# Patient Record
Sex: FEMALE | Race: WHITE | Employment: OTHER | URBAN - METROPOLITAN AREA
[De-identification: names, ages, dates, MRNs, and addresses within clinical notes are randomized per-mention and may not be internally consistent; named-entity substitution may affect disease eponyms.]

---

## 2017-02-22 ENCOUNTER — HOSPITAL ENCOUNTER (OUTPATIENT)
Dept: GENERAL RADIOLOGY | Age: 82
Discharge: HOME | End: 2017-02-22
Attending: INTERNAL MEDICINE | Admitting: INTERNAL MEDICINE

## 2017-02-22 LAB
ALBUMIN SERPL-MCNC: 3.7 GM/DL (ref 3.4–5)
ALP BLD-CCNC: 96 IU/L (ref 40–129)
ALT SERPL-CCNC: 19 U/L (ref 10–40)
AST SERPL-CCNC: 16 IU/L (ref 15–37)
BILIRUB SERPL-MCNC: 0.4 MG/DL (ref 0–1)
BILIRUBIN DIRECT: 0.2 MG/DL (ref 0–0.3)
BILIRUBIN, INDIRECT: 0.2 MG/DL (ref 0–0.7)
CHOLESTEROL: 139 MG/DL
ESTIMATED AVERAGE GLUCOSE: 148 MG/DL
GLUCOSE FASTING: 175 MG/DL (ref 70–99)
HBA1C MFR BLD: 6.8 % (ref 4.2–6.3)
HDLC SERPL-MCNC: 29 MG/DL
LDL CHOLESTEROL DIRECT: 78 MG/DL
TOTAL PROTEIN: 6.9 GM/DL (ref 6.4–8.2)
TRIGL SERPL-MCNC: 234 MG/DL

## 2017-05-25 ENCOUNTER — HOSPITAL ENCOUNTER (OUTPATIENT)
Dept: GENERAL RADIOLOGY | Age: 82
Discharge: HOME | End: 2017-05-25
Attending: INTERNAL MEDICINE | Admitting: INTERNAL MEDICINE

## 2017-05-25 LAB
ESTIMATED AVERAGE GLUCOSE: 160 MG/DL
GLUCOSE FASTING: 134 MG/DL (ref 70–99)
HBA1C MFR BLD: 7.2 % (ref 4.2–6.3)

## 2017-09-11 ENCOUNTER — HOSPITAL ENCOUNTER (OUTPATIENT)
Dept: GENERAL RADIOLOGY | Age: 82
Discharge: HOME | End: 2017-09-11
Attending: INTERNAL MEDICINE | Admitting: INTERNAL MEDICINE

## 2017-09-11 DIAGNOSIS — I10 ESSENTIAL HYPERTENSION, MALIGNANT: ICD-10-CM

## 2017-09-11 LAB
ALBUMIN SERPL-MCNC: 3.8 GM/DL (ref 3.4–5)
ALP BLD-CCNC: 100 IU/L (ref 40–129)
ALT SERPL-CCNC: 23 U/L (ref 10–40)
ANION GAP SERPL CALCULATED.3IONS-SCNC: 14 MMOL/L (ref 4–16)
AST SERPL-CCNC: 23 IU/L (ref 15–37)
BACTERIA: ABNORMAL /HPF
BASOPHILS ABSOLUTE: 0.1 K/CU MM
BASOPHILS RELATIVE PERCENT: 0.9 % (ref 0–1)
BILIRUB SERPL-MCNC: 0.3 MG/DL (ref 0–1)
BILIRUBIN URINE: NEGATIVE MG/DL
BLOOD, URINE: ABNORMAL
BUN BLDV-MCNC: 21 MG/DL (ref 6–23)
CALCIUM SERPL-MCNC: 9.5 MG/DL (ref 8.3–10.6)
CHLORIDE BLD-SCNC: 101 MMOL/L (ref 99–110)
CHOLESTEROL: 128 MG/DL
CLARITY: ABNORMAL
CO2: 21 MMOL/L (ref 21–32)
COLOR: YELLOW
CREAT SERPL-MCNC: 1.1 MG/DL (ref 0.6–1.1)
DIFFERENTIAL TYPE: ABNORMAL
EOSINOPHILS ABSOLUTE: 0.2 K/CU MM
EOSINOPHILS RELATIVE PERCENT: 3.2 % (ref 0–3)
ESTIMATED AVERAGE GLUCOSE: 157 MG/DL
GFR AFRICAN AMERICAN: 58 ML/MIN/1.73M2
GFR NON-AFRICAN AMERICAN: 48 ML/MIN/1.73M2
GLUCOSE FASTING: 182 MG/DL (ref 70–99)
GLUCOSE, URINE: NEGATIVE MG/DL
HBA1C MFR BLD: 7.1 % (ref 4.2–6.3)
HCT VFR BLD CALC: 34.7 % (ref 37–47)
HDLC SERPL-MCNC: 30 MG/DL
HEMOGLOBIN: 10.8 GM/DL (ref 12.5–16)
IMMATURE NEUTROPHIL %: 0.4 % (ref 0–0.43)
KETONES, URINE: NEGATIVE MG/DL
LDL CHOLESTEROL DIRECT: 71 MG/DL
LEUKOCYTE ESTERASE, URINE: ABNORMAL
LYMPHOCYTES ABSOLUTE: 1.4 K/CU MM
LYMPHOCYTES RELATIVE PERCENT: 18.9 % (ref 24–44)
MCH RBC QN AUTO: 26.7 PG (ref 27–31)
MCHC RBC AUTO-ENTMCNC: 31.1 % (ref 32–36)
MCV RBC AUTO: 85.7 FL (ref 78–100)
MONOCYTES ABSOLUTE: 0.6 K/CU MM
MONOCYTES RELATIVE PERCENT: 7.5 % (ref 0–4)
MUCUS: ABNORMAL HPF
NITRITE URINE, QUANTITATIVE: NEGATIVE
NUCLEATED RBC %: 0 %
PDW BLD-RTO: 16.1 % (ref 11.7–14.9)
PH, URINE: 5 (ref 5–8)
PLATELET # BLD: 298 K/CU MM (ref 140–440)
PMV BLD AUTO: 11.4 FL (ref 7.5–11.1)
POTASSIUM SERPL-SCNC: 4.7 MMOL/L (ref 3.5–5.1)
PROTEIN UA: NEGATIVE MG/DL
RBC # BLD: 4.05 M/CU MM (ref 4.2–5.4)
RBC URINE: 2 /HPF (ref 0–6)
SEGMENTED NEUTROPHILS ABSOLUTE COUNT: 5.3 K/CU MM
SEGMENTED NEUTROPHILS RELATIVE PERCENT: 69.1 % (ref 36–66)
SODIUM BLD-SCNC: 136 MMOL/L (ref 135–145)
SPECIFIC GRAVITY UA: 1.01 (ref 1–1.03)
SQUAMOUS EPITHELIAL: 3 /HPF
T3 FREE: 3.1 PG/ML (ref 2.3–4.2)
T4 FREE: 1.33 NG/DL (ref 0.9–1.8)
TOTAL IMMATURE NEUTOROPHIL: 0.03 K/CU MM
TOTAL NUCLEATED RBC: 0 K/CU MM
TOTAL PROTEIN: 7.4 GM/DL (ref 6.4–8.2)
TRICHOMONAS: ABNORMAL /HPF
TRIGL SERPL-MCNC: 150 MG/DL
TSH HIGH SENSITIVITY: 4.16 UIU/ML (ref 0.27–4.2)
UROBILINOGEN, URINE: NORMAL MG/DL (ref 0.2–1)
WBC # BLD: 7.6 K/CU MM (ref 4–10.5)
WBC CLUMP: ABNORMAL /HPF
WBC UA: 111 /HPF (ref 0–5)
YEAST: ABNORMAL /HPF

## 2017-09-18 ENCOUNTER — HOSPITAL ENCOUNTER (OUTPATIENT)
Dept: OTHER | Age: 82
Discharge: HOME | End: 2017-09-18
Attending: INTERNAL MEDICINE | Admitting: INTERNAL MEDICINE

## 2017-09-22 LAB
HEMOCCULT SP1 STL QL: NEGATIVE
OCCULT BLOOD 2: NEGATIVE
OCCULT BLOOD 3: NEGATIVE

## 2017-12-15 ENCOUNTER — HOSPITAL ENCOUNTER (OUTPATIENT)
Dept: GENERAL RADIOLOGY | Age: 82
Discharge: HOME | End: 2017-12-15
Attending: INTERNAL MEDICINE | Admitting: INTERNAL MEDICINE

## 2017-12-15 LAB
BASOPHILS ABSOLUTE: 0.1 K/CU MM
BASOPHILS RELATIVE PERCENT: 1.1 % (ref 0–1)
DIFFERENTIAL TYPE: ABNORMAL
EOSINOPHILS ABSOLUTE: 0.2 K/CU MM
EOSINOPHILS RELATIVE PERCENT: 2.9 % (ref 0–3)
ESTIMATED AVERAGE GLUCOSE: 157 MG/DL
GLUCOSE FASTING: 124 MG/DL (ref 70–99)
HBA1C MFR BLD: 7.1 % (ref 4.2–6.3)
HCT VFR BLD CALC: 41.7 % (ref 37–47)
HEMOGLOBIN: 13.2 GM/DL (ref 12.5–16)
IMMATURE NEUTROPHIL %: 0.3 % (ref 0–0.43)
LYMPHOCYTES ABSOLUTE: 1.6 K/CU MM
LYMPHOCYTES RELATIVE PERCENT: 22.3 % (ref 24–44)
MCH RBC QN AUTO: 26.5 PG (ref 27–31)
MCHC RBC AUTO-ENTMCNC: 31.7 % (ref 32–36)
MCV RBC AUTO: 83.6 FL (ref 78–100)
MONOCYTES ABSOLUTE: 0.7 K/CU MM
MONOCYTES RELATIVE PERCENT: 9.2 % (ref 0–4)
NUCLEATED RBC %: 0 %
PDW BLD-RTO: 15.8 % (ref 11.7–14.9)
PLATELET # BLD: 224 K/CU MM (ref 140–440)
PMV BLD AUTO: 11.9 FL (ref 7.5–11.1)
RBC # BLD: 4.99 M/CU MM (ref 4.2–5.4)
SEGMENTED NEUTROPHILS ABSOLUTE COUNT: 4.7 K/CU MM
SEGMENTED NEUTROPHILS RELATIVE PERCENT: 64.2 % (ref 36–66)
TOTAL IMMATURE NEUTOROPHIL: 0.02 K/CU MM
TOTAL NUCLEATED RBC: 0 K/CU MM
WBC # BLD: 7.4 K/CU MM (ref 4–10.5)

## 2018-03-09 ENCOUNTER — HOSPITAL ENCOUNTER (OUTPATIENT)
Dept: GENERAL RADIOLOGY | Age: 83
Discharge: HOME | End: 2018-03-09
Attending: INTERNAL MEDICINE | Admitting: INTERNAL MEDICINE

## 2018-03-09 LAB
ALBUMIN SERPL-MCNC: 3.8 GM/DL (ref 3.4–5)
ALP BLD-CCNC: 99 IU/L (ref 40–129)
ALT SERPL-CCNC: 27 U/L (ref 10–40)
AST SERPL-CCNC: 26 IU/L (ref 15–37)
BILIRUB SERPL-MCNC: 0.3 MG/DL (ref 0–1)
BILIRUBIN DIRECT: 0.2 MG/DL (ref 0–0.3)
BILIRUBIN, INDIRECT: 0.1 MG/DL (ref 0–0.7)
CHOLESTEROL: 107 MG/DL
ESTIMATED AVERAGE GLUCOSE: 134 MG/DL
GLUCOSE FASTING: 115 MG/DL (ref 70–99)
HBA1C MFR BLD: 6.3 % (ref 4.2–6.3)
HDLC SERPL-MCNC: 30 MG/DL
LDL CHOLESTEROL DIRECT: 60 MG/DL
TOTAL PROTEIN: 7.2 GM/DL (ref 6.4–8.2)
TRIGL SERPL-MCNC: 170 MG/DL

## 2018-06-11 ENCOUNTER — HOSPITAL ENCOUNTER (OUTPATIENT)
Dept: GENERAL RADIOLOGY | Age: 83
Discharge: HOME | End: 2018-06-11
Attending: INTERNAL MEDICINE | Admitting: INTERNAL MEDICINE

## 2018-06-11 LAB
ESTIMATED AVERAGE GLUCOSE: 160 MG/DL
GLUCOSE FASTING: 159 MG/DL (ref 70–99)
HBA1C MFR BLD: 7.2 % (ref 4.2–6.3)

## 2018-09-12 ENCOUNTER — HOSPITAL ENCOUNTER (OUTPATIENT)
Dept: GENERAL RADIOLOGY | Age: 83
Discharge: HOME | End: 2018-09-12
Attending: INTERNAL MEDICINE | Admitting: INTERNAL MEDICINE

## 2018-09-12 LAB
ALBUMIN SERPL-MCNC: 3.7 GM/DL (ref 3.4–5)
ALP BLD-CCNC: 84 IU/L (ref 40–128)
ALT SERPL-CCNC: 18 U/L (ref 10–40)
ANION GAP SERPL CALCULATED.3IONS-SCNC: 12 MMOL/L (ref 4–16)
AST SERPL-CCNC: 18 IU/L (ref 15–37)
BACTERIA: ABNORMAL /HPF
BASOPHILS ABSOLUTE: 0.1 K/CU MM
BASOPHILS RELATIVE PERCENT: 0.7 % (ref 0–1)
BILIRUB SERPL-MCNC: 0.4 MG/DL (ref 0–1)
BILIRUBIN URINE: NEGATIVE MG/DL
BLOOD, URINE: ABNORMAL
BUN BLDV-MCNC: 14 MG/DL (ref 6–23)
CALCIUM SERPL-MCNC: 9.4 MG/DL (ref 8.3–10.6)
CHLORIDE BLD-SCNC: 104 MMOL/L (ref 99–110)
CHOLESTEROL: 120 MG/DL
CLARITY: ABNORMAL
CO2: 24 MMOL/L (ref 21–32)
COLOR: YELLOW
CREAT SERPL-MCNC: 1 MG/DL (ref 0.6–1.1)
DIFFERENTIAL TYPE: ABNORMAL
EOSINOPHILS ABSOLUTE: 0.2 K/CU MM
EOSINOPHILS RELATIVE PERCENT: 3.1 % (ref 0–3)
ESTIMATED AVERAGE GLUCOSE: 160 MG/DL
GFR AFRICAN AMERICAN: >60 ML/MIN/1.73M2
GFR NON-AFRICAN AMERICAN: 53 ML/MIN/1.73M2
GLUCOSE BLD-MCNC: 137 MG/DL (ref 70–99)
GLUCOSE, URINE: NEGATIVE MG/DL
HBA1C MFR BLD: 7.2 % (ref 4.2–6.3)
HCT VFR BLD CALC: 39.2 % (ref 37–47)
HDLC SERPL-MCNC: 29 MG/DL
HEMOGLOBIN: 12.7 GM/DL (ref 12.5–16)
IMMATURE NEUTROPHIL %: 0.3 % (ref 0–0.43)
KETONES, URINE: NEGATIVE MG/DL
LDL CHOLESTEROL DIRECT: 72 MG/DL
LEUKOCYTE ESTERASE, URINE: ABNORMAL
LYMPHOCYTES ABSOLUTE: 1.4 K/CU MM
LYMPHOCYTES RELATIVE PERCENT: 19.9 % (ref 24–44)
MCH RBC QN AUTO: 28.6 PG (ref 27–31)
MCHC RBC AUTO-ENTMCNC: 32.4 % (ref 32–36)
MCV RBC AUTO: 88.3 FL (ref 78–100)
MONOCYTES ABSOLUTE: 0.5 K/CU MM
MONOCYTES RELATIVE PERCENT: 7.7 % (ref 0–4)
NITRITE URINE, QUANTITATIVE: POSITIVE
NUCLEATED RBC %: 0 %
PDW BLD-RTO: 15 % (ref 11.7–14.9)
PH, URINE: 5 (ref 5–8)
PLATELET # BLD: 226 K/CU MM (ref 140–440)
PMV BLD AUTO: 12.1 FL (ref 7.5–11.1)
POTASSIUM SERPL-SCNC: 4.2 MMOL/L (ref 3.5–5.1)
PROTEIN UA: NEGATIVE MG/DL
RBC # BLD: 4.44 M/CU MM (ref 4.2–5.4)
RBC URINE: 7 /HPF (ref 0–6)
SEGMENTED NEUTROPHILS ABSOLUTE COUNT: 4.7 K/CU MM
SEGMENTED NEUTROPHILS RELATIVE PERCENT: 68.3 % (ref 36–66)
SODIUM BLD-SCNC: 140 MMOL/L (ref 135–145)
SPECIFIC GRAVITY UA: 1.01 (ref 1–1.03)
SQUAMOUS EPITHELIAL: 3 /HPF
T3 FREE: 3 PG/ML (ref 2.3–4.2)
T4 FREE: 1.21 NG/DL (ref 0.9–1.8)
TOTAL IMMATURE NEUTOROPHIL: 0.02 K/CU MM
TOTAL NUCLEATED RBC: 0 K/CU MM
TOTAL PROTEIN: 6.7 GM/DL (ref 6.4–8.2)
TRICHOMONAS: ABNORMAL /HPF
TRIGL SERPL-MCNC: 173 MG/DL
UROBILINOGEN, URINE: NORMAL MG/DL (ref 0.2–1)
WBC # BLD: 6.9 K/CU MM (ref 4–10.5)
WBC CLUMP: ABNORMAL /HPF
WBC UA: 92 /HPF (ref 0–5)

## 2018-12-11 ENCOUNTER — HOSPITAL ENCOUNTER (OUTPATIENT)
Age: 83
Discharge: HOME | End: 2018-12-11
Payer: MEDICARE

## 2018-12-11 LAB
ESTIMATED AVERAGE GLUCOSE: 148 MG/DL
GLUCOSE FASTING: 125 MG/DL (ref 70–99)
HBA1C MFR BLD: 6.8 % (ref 4.2–6.3)

## 2018-12-11 PROCEDURE — 82947 ASSAY GLUCOSE BLOOD QUANT: CPT

## 2018-12-11 PROCEDURE — 36415 COLL VENOUS BLD VENIPUNCTURE: CPT

## 2018-12-11 PROCEDURE — 83036 HEMOGLOBIN GLYCOSYLATED A1C: CPT

## 2019-02-27 ENCOUNTER — HOSPITAL ENCOUNTER (OUTPATIENT)
Age: 84
Discharge: HOME | End: 2019-02-27
Payer: MEDICARE

## 2019-02-27 LAB
ALBUMIN SERPL-MCNC: 4.1 GM/DL (ref 3.4–5)
ALP BLD-CCNC: 76 IU/L (ref 40–129)
ALT SERPL-CCNC: 17 U/L (ref 10–40)
AST SERPL-CCNC: 15 IU/L (ref 15–37)
BILIRUB SERPL-MCNC: 0.5 MG/DL (ref 0–1)
BILIRUBIN DIRECT: 0.2 MG/DL (ref 0–0.3)
BILIRUBIN, INDIRECT: 0.3 MG/DL (ref 0–0.7)
CHOLESTEROL: 125 MG/DL
ESTIMATED AVERAGE GLUCOSE: 140 MG/DL
GLUCOSE FASTING: 135 MG/DL (ref 70–99)
HBA1C MFR BLD: 6.5 % (ref 4.2–6.3)
HDLC SERPL-MCNC: 36 MG/DL
LDL CHOLESTEROL DIRECT: 77 MG/DL
TOTAL PROTEIN: 7.3 GM/DL (ref 6.4–8.2)
TRIGL SERPL-MCNC: 116 MG/DL

## 2019-02-27 PROCEDURE — 80061 LIPID PANEL: CPT

## 2019-02-27 PROCEDURE — 83036 HEMOGLOBIN GLYCOSYLATED A1C: CPT

## 2019-02-27 PROCEDURE — 82947 ASSAY GLUCOSE BLOOD QUANT: CPT

## 2019-02-27 PROCEDURE — 36415 COLL VENOUS BLD VENIPUNCTURE: CPT

## 2019-02-27 PROCEDURE — 83721 ASSAY OF BLOOD LIPOPROTEIN: CPT

## 2019-02-27 PROCEDURE — 80076 HEPATIC FUNCTION PANEL: CPT

## 2019-09-12 ENCOUNTER — APPOINTMENT (OUTPATIENT)
Dept: GENERAL RADIOLOGY | Age: 84
DRG: 287 | End: 2019-09-12
Payer: MEDICARE

## 2019-09-12 ENCOUNTER — HOSPITAL ENCOUNTER (INPATIENT)
Age: 84
LOS: 5 days | Discharge: HOME | DRG: 287 | End: 2019-09-17
Attending: EMERGENCY MEDICINE | Admitting: GENERAL PRACTICE
Payer: MEDICARE

## 2019-09-12 ENCOUNTER — APPOINTMENT (OUTPATIENT)
Dept: CT IMAGING | Age: 84
DRG: 287 | End: 2019-09-12
Payer: MEDICARE

## 2019-09-12 PROBLEM — I48.92 ATRIAL FLUTTER (HCC): Status: ACTIVE | Noted: 2019-09-12

## 2019-09-12 LAB
ALBUMIN SERPL-MCNC: 3.1 GM/DL (ref 3.4–5)
ALP BLD-CCNC: 98 IU/L (ref 40–129)
ALT SERPL-CCNC: 17 U/L (ref 10–40)
ANION GAP SERPL CALCULATED.3IONS-SCNC: 17 MMOL/L (ref 4–16)
APTT: 36.1 SECONDS (ref 21.2–33)
AST SERPL-CCNC: 23 IU/L (ref 15–37)
BASOPHILS ABSOLUTE: 0 K/CU MM
BASOPHILS RELATIVE PERCENT: 0.6 % (ref 0–1)
BILIRUB SERPL-MCNC: 1.3 MG/DL (ref 0–1)
BUN BLDV-MCNC: 15 MG/DL (ref 6–23)
CALCIUM SERPL-MCNC: 7.6 MG/DL (ref 8.3–10.6)
CHLORIDE BLD-SCNC: 100 MMOL/L (ref 99–110)
CO2: 20 MMOL/L (ref 21–32)
CREAT SERPL-MCNC: 1.3 MG/DL (ref 0.6–1.1)
D DIMER: 891 NG/ML(DDU)
DIFFERENTIAL TYPE: ABNORMAL
EOSINOPHILS ABSOLUTE: 0 K/CU MM
EOSINOPHILS RELATIVE PERCENT: 0.3 % (ref 0–3)
GFR AFRICAN AMERICAN: 47 ML/MIN/1.73M2
GFR NON-AFRICAN AMERICAN: 39 ML/MIN/1.73M2
GLUCOSE BLD-MCNC: 110 MG/DL (ref 70–99)
GLUCOSE BLD-MCNC: 130 MG/DL (ref 70–99)
HCT VFR BLD CALC: 34.5 % (ref 37–47)
HEMOGLOBIN: 10.7 GM/DL (ref 12.5–16)
IMMATURE NEUTROPHIL %: 0.3 % (ref 0–0.43)
INR BLD: 1.81 INDEX
LYMPHOCYTES ABSOLUTE: 1 K/CU MM
LYMPHOCYTES RELATIVE PERCENT: 14.6 % (ref 24–44)
MAGNESIUM: 0.4 MG/DL (ref 1.8–2.4)
MCH RBC QN AUTO: 25.1 PG (ref 27–31)
MCHC RBC AUTO-ENTMCNC: 31 % (ref 32–36)
MCV RBC AUTO: 80.8 FL (ref 78–100)
MONOCYTES ABSOLUTE: 0.4 K/CU MM
MONOCYTES RELATIVE PERCENT: 6 % (ref 0–4)
NUCLEATED RBC %: 0 %
PDW BLD-RTO: 17.9 % (ref 11.7–14.9)
PLATELET # BLD: 252 K/CU MM (ref 140–440)
PMV BLD AUTO: 11.7 FL (ref 7.5–11.1)
POTASSIUM SERPL-SCNC: ABNORMAL MMOL/L (ref 3.5–5.1)
PRO-BNP: 2749 PG/ML
PROTHROMBIN TIME: 20.8 SECONDS (ref 9.12–12.5)
RBC # BLD: 4.27 M/CU MM (ref 4.2–5.4)
SEGMENTED NEUTROPHILS ABSOLUTE COUNT: 5.4 K/CU MM
SEGMENTED NEUTROPHILS RELATIVE PERCENT: 78.2 % (ref 36–66)
SODIUM BLD-SCNC: 137 MMOL/L (ref 135–145)
T4 FREE: 1.4 NG/DL (ref 0.9–1.8)
TOTAL IMMATURE NEUTOROPHIL: 0.02 K/CU MM
TOTAL NUCLEATED RBC: 0 K/CU MM
TOTAL PROTEIN: 7.3 GM/DL (ref 6.4–8.2)
TROPONIN T: <0.01 NG/ML
TSH HIGH SENSITIVITY: 5.2 UIU/ML (ref 0.27–4.2)
WBC # BLD: 6.9 K/CU MM (ref 4–10.5)

## 2019-09-12 PROCEDURE — 6370000000 HC RX 637 (ALT 250 FOR IP): Performed by: GENERAL PRACTICE

## 2019-09-12 PROCEDURE — 99285 EMERGENCY DEPT VISIT HI MDM: CPT

## 2019-09-12 PROCEDURE — 6370000000 HC RX 637 (ALT 250 FOR IP): Performed by: INTERNAL MEDICINE

## 2019-09-12 PROCEDURE — 84439 ASSAY OF FREE THYROXINE: CPT

## 2019-09-12 PROCEDURE — 2140000000 HC CCU INTERMEDIATE R&B

## 2019-09-12 PROCEDURE — 6360000004 HC RX CONTRAST MEDICATION: Performed by: INTERNAL MEDICINE

## 2019-09-12 PROCEDURE — 6360000002 HC RX W HCPCS: Performed by: INTERNAL MEDICINE

## 2019-09-12 PROCEDURE — 6360000002 HC RX W HCPCS: Performed by: GENERAL PRACTICE

## 2019-09-12 PROCEDURE — 85025 COMPLETE CBC W/AUTO DIFF WBC: CPT

## 2019-09-12 PROCEDURE — 96365 THER/PROPH/DIAG IV INF INIT: CPT

## 2019-09-12 PROCEDURE — 93005 ELECTROCARDIOGRAM TRACING: CPT | Performed by: EMERGENCY MEDICINE

## 2019-09-12 PROCEDURE — 83735 ASSAY OF MAGNESIUM: CPT

## 2019-09-12 PROCEDURE — 80053 COMPREHEN METABOLIC PANEL: CPT

## 2019-09-12 PROCEDURE — 82962 GLUCOSE BLOOD TEST: CPT

## 2019-09-12 PROCEDURE — 85379 FIBRIN DEGRADATION QUANT: CPT

## 2019-09-12 PROCEDURE — 84443 ASSAY THYROID STIM HORMONE: CPT

## 2019-09-12 PROCEDURE — 85730 THROMBOPLASTIN TIME PARTIAL: CPT

## 2019-09-12 PROCEDURE — 36415 COLL VENOUS BLD VENIPUNCTURE: CPT

## 2019-09-12 PROCEDURE — 71275 CT ANGIOGRAPHY CHEST: CPT

## 2019-09-12 PROCEDURE — 83880 ASSAY OF NATRIURETIC PEPTIDE: CPT

## 2019-09-12 PROCEDURE — 96375 TX/PRO/DX INJ NEW DRUG ADDON: CPT

## 2019-09-12 PROCEDURE — 71045 X-RAY EXAM CHEST 1 VIEW: CPT

## 2019-09-12 PROCEDURE — 2580000003 HC RX 258: Performed by: INTERNAL MEDICINE

## 2019-09-12 PROCEDURE — 83036 HEMOGLOBIN GLYCOSYLATED A1C: CPT

## 2019-09-12 PROCEDURE — 84484 ASSAY OF TROPONIN QUANT: CPT

## 2019-09-12 PROCEDURE — 85610 PROTHROMBIN TIME: CPT

## 2019-09-12 RX ORDER — SODIUM CHLORIDE 0.9 % (FLUSH) 0.9 %
10 SYRINGE (ML) INJECTION PRN
Status: DISCONTINUED | OUTPATIENT
Start: 2019-09-12 | End: 2019-09-17 | Stop reason: HOSPADM

## 2019-09-12 RX ORDER — PANTOPRAZOLE SODIUM 40 MG/1
40 TABLET, DELAYED RELEASE ORAL DAILY
Status: DISCONTINUED | OUTPATIENT
Start: 2019-09-12 | End: 2019-09-17 | Stop reason: HOSPADM

## 2019-09-12 RX ORDER — POTASSIUM CHLORIDE 20 MEQ/1
40 TABLET, EXTENDED RELEASE ORAL ONCE
Status: COMPLETED | OUTPATIENT
Start: 2019-09-12 | End: 2019-09-12

## 2019-09-12 RX ORDER — ONDANSETRON 2 MG/ML
4 INJECTION INTRAMUSCULAR; INTRAVENOUS EVERY 6 HOURS PRN
Status: DISCONTINUED | OUTPATIENT
Start: 2019-09-12 | End: 2019-09-17 | Stop reason: HOSPADM

## 2019-09-12 RX ORDER — DIPHENOXYLATE HYDROCHLORIDE AND ATROPINE SULFATE 2.5; .025 MG/1; MG/1
1 TABLET ORAL 4 TIMES DAILY PRN
Status: DISCONTINUED | OUTPATIENT
Start: 2019-09-12 | End: 2019-09-17 | Stop reason: HOSPADM

## 2019-09-12 RX ORDER — NICOTINE POLACRILEX 4 MG
15 LOZENGE BUCCAL PRN
Status: DISCONTINUED | OUTPATIENT
Start: 2019-09-12 | End: 2019-09-17 | Stop reason: HOSPADM

## 2019-09-12 RX ORDER — CHOLESTYRAMINE LIGHT 4 G/5.7G
4 POWDER, FOR SUSPENSION ORAL DAILY
Status: ON HOLD | COMMUNITY
End: 2019-09-17 | Stop reason: HOSPADM

## 2019-09-12 RX ORDER — DIPHENOXYLATE HYDROCHLORIDE AND ATROPINE SULFATE 2.5; .025 MG/1; MG/1
1 TABLET ORAL 4 TIMES DAILY PRN
Status: ON HOLD | COMMUNITY
End: 2019-11-18 | Stop reason: HOSPADM

## 2019-09-12 RX ORDER — LATANOPROST 50 UG/ML
1 SOLUTION/ DROPS OPHTHALMIC NIGHTLY
Status: DISCONTINUED | OUTPATIENT
Start: 2019-09-12 | End: 2019-09-17 | Stop reason: HOSPADM

## 2019-09-12 RX ORDER — SODIUM CHLORIDE 9 MG/ML
INJECTION, SOLUTION INTRAVENOUS CONTINUOUS
Status: DISCONTINUED | OUTPATIENT
Start: 2019-09-12 | End: 2019-09-12

## 2019-09-12 RX ORDER — METOPROLOL TARTRATE 50 MG/1
50 TABLET, FILM COATED ORAL 2 TIMES DAILY
Status: DISCONTINUED | OUTPATIENT
Start: 2019-09-12 | End: 2019-09-14

## 2019-09-12 RX ORDER — POTASSIUM CHLORIDE AND SODIUM CHLORIDE 450; 150 MG/100ML; MG/100ML
INJECTION, SOLUTION INTRAVENOUS CONTINUOUS
Status: DISCONTINUED | OUTPATIENT
Start: 2019-09-12 | End: 2019-09-13

## 2019-09-12 RX ORDER — SODIUM CHLORIDE 0.9 % (FLUSH) 0.9 %
10 SYRINGE (ML) INJECTION EVERY 12 HOURS SCHEDULED
Status: DISCONTINUED | OUTPATIENT
Start: 2019-09-12 | End: 2019-09-17 | Stop reason: HOSPADM

## 2019-09-12 RX ORDER — DEXTROSE MONOHYDRATE 50 MG/ML
100 INJECTION, SOLUTION INTRAVENOUS PRN
Status: DISCONTINUED | OUTPATIENT
Start: 2019-09-12 | End: 2019-09-17 | Stop reason: HOSPADM

## 2019-09-12 RX ORDER — SODIUM CHLORIDE 0.9 % (FLUSH) 0.9 %
10 SYRINGE (ML) INJECTION
Status: COMPLETED | OUTPATIENT
Start: 2019-09-12 | End: 2019-09-12

## 2019-09-12 RX ORDER — DEXTROSE MONOHYDRATE 25 G/50ML
12.5 INJECTION, SOLUTION INTRAVENOUS PRN
Status: DISCONTINUED | OUTPATIENT
Start: 2019-09-12 | End: 2019-09-17 | Stop reason: HOSPADM

## 2019-09-12 RX ORDER — MAGNESIUM SULFATE 4 G/50ML
4 INJECTION INTRAVENOUS ONCE
Status: COMPLETED | OUTPATIENT
Start: 2019-09-12 | End: 2019-09-12

## 2019-09-12 RX ADMIN — AMIODARONE HYDROCHLORIDE 150 MG: 50 INJECTION, SOLUTION INTRAVENOUS at 18:45

## 2019-09-12 RX ADMIN — IOPAMIDOL 75 ML: 755 INJECTION, SOLUTION INTRAVENOUS at 18:12

## 2019-09-12 RX ADMIN — PANTOPRAZOLE SODIUM 40 MG: 40 TABLET, DELAYED RELEASE ORAL at 22:49

## 2019-09-12 RX ADMIN — LATANOPROST 1 DROP: 50 SOLUTION/ DROPS OPHTHALMIC at 22:49

## 2019-09-12 RX ADMIN — SODIUM CHLORIDE: 9 INJECTION, SOLUTION INTRAVENOUS at 17:38

## 2019-09-12 RX ADMIN — POTASSIUM CHLORIDE 40 MEQ: 20 TABLET, EXTENDED RELEASE ORAL at 18:57

## 2019-09-12 RX ADMIN — MAGNESIUM SULFATE HEPTAHYDRATE 4 G: 80 INJECTION, SOLUTION INTRAVENOUS at 19:22

## 2019-09-12 RX ADMIN — AMIODARONE HYDROCHLORIDE 1 MG/MIN: 50 INJECTION, SOLUTION INTRAVENOUS at 19:15

## 2019-09-12 RX ADMIN — METOPROLOL TARTRATE 50 MG: 50 TABLET ORAL at 17:37

## 2019-09-12 RX ADMIN — ENOXAPARIN SODIUM 40 MG: 40 INJECTION SUBCUTANEOUS at 22:49

## 2019-09-12 RX ADMIN — POTASSIUM CHLORIDE AND SODIUM CHLORIDE: 450; 150 INJECTION, SOLUTION INTRAVENOUS at 19:22

## 2019-09-12 RX ADMIN — ENOXAPARIN SODIUM 70 MG: 80 INJECTION SUBCUTANEOUS at 17:38

## 2019-09-12 RX ADMIN — Medication 10 ML: at 18:14

## 2019-09-12 ASSESSMENT — ENCOUNTER SYMPTOMS
SHORTNESS OF BREATH: 0
COUGH: 0
NAUSEA: 0
CONSTIPATION: 0
SORE THROAT: 0
WHEEZING: 0
SINUS PRESSURE: 0
ABDOMINAL PAIN: 0
DIARRHEA: 0
VOMITING: 0
RHINORRHEA: 0

## 2019-09-12 ASSESSMENT — PAIN SCALES - GENERAL
PAINLEVEL_OUTOF10: 0

## 2019-09-12 NOTE — ED PROVIDER NOTES
Emergency Department Encounter    Patient: Paloma Brown  MRN: [id number]  : 1935  Date of Evaluation: 2019  ED Provider:  Venu Londono    Triage Chief Complaint:   Tachycardia and Fatigue    HPI:  Paloma Brown is a 81 y.o. female who presents with palpitations. Patient states that she has been more fatigued over the last couple of days, and went to see her PCP today. She does notice a typical, routine follow-up, she is found to be tachycardic, was sent to her cardiologist, who then referred her to the ED. Denies F/C, CP, SOB, N/V, abdominal pain, dysuria, diarrhea and constipatin. ROS:  Review of Systems   Constitutional: Negative for chills and fever. HENT: Negative for congestion, rhinorrhea, sinus pressure and sore throat. Eyes: Negative for visual disturbance. Respiratory: Negative for cough, shortness of breath and wheezing. Cardiovascular: Positive for palpitations. Negative for chest pain. Gastrointestinal: Negative for abdominal pain, constipation, diarrhea, nausea and vomiting. Genitourinary: Negative for dysuria, frequency and urgency. Musculoskeletal: Negative for arthralgias and myalgias. Skin: Negative for rash. Neurological: Negative for dizziness and syncope. Psychiatric/Behavioral: Negative for agitation, behavioral problems and confusion. Past Medical History:   Diagnosis Date    Diabetes mellitus ([address]     Hyperlipidemia     Hypertension      Past Surgical History:   Procedure Laterality Date    ABDOMINAL EXPLORATION SURGERY  [id number]    distal gastrectomy, migel en y , cholecystectomy    HYSTERECTOMY      TONSILLECTOMY AND ADENOIDECTOMY       History reviewed. No pertinent family history.   Social History     Socioeconomic History    Marital status:      Spouse name: Not on file    Number of children: Not on file    Years of education: Not on file    Highest education level: Not on file   Occupational History    Not on file   Social Needs    Financial resource strain: Not on file    Food insecurity:     Worry: Not on file     Inability: Not on file    Transportation needs:     Medical: Not on file     Non-medical: Not on file   Tobacco Use    Smoking status: Former Smoker    Smokeless tobacco: Never Used   Substance and Sexual Activity    Alcohol use: Yes     Alcohol/week: 0.0 standard drinks     Comment: rarely    Drug use: No    Sexual activity: Not on file   Lifestyle    Physical activity:     Days per week: Not on file     Minutes per session: Not on file    Stress: Not on file   Relationships    Social connections:     Talks on phone: Not on file     Gets together: Not on file     Attends Muslim service: Not on file     Active member of club or organization: Not on file     Attends meetings of clubs or organizations: Not on file     Relationship status: Not on file    Intimate partner violence:     Fear of current or ex partner: Not on file     Emotionally abused: Not on file     Physically abused: Not on file     Forced sexual activity: Not on file   Other Topics Concern    Not on file   Social History Narrative    Not on file     No current facility-administered medications for this encounter.       Current Outpatient Medications   Medication Sig Dispense Refill    pantoprazole (PROTONIX) 40 MG tablet Take 1 tablet by mouth daily 90 tablet 3    pantoprazole (PROTONIX) 40 MG tablet Take 1 tablet by mouth daily 30 tablet 3    glipiZIDE (GLUCOTROL) 5 MG tablet Take 5 mg by mouth Daily      atorvastatin (LIPITOR) 10 MG tablet Take 10 mg by mouth daily      lisinopril (PRINIVIL;ZESTRIL) 10 MG tablet Take 10 mg by mouth daily      furosemide (LASIX) 20 MG tablet Take 10 mg by mouth daily       Allergies   Allergen Reactions    Zinc     Pcn [Penicillins] Rash       Nursing Notes Reviewed    Physical Exam:  Triage VS:    ED Triage Vitals [09/12/19 1625]   Enc Vitals Group      /73      Pulse 134 Resp 19      Temp 98 °F (36.7 °C)      Temp Source Oral      SpO2 97 %      Weight 155 lb 3.2 oz (70.4 kg)      Height 5' 7\" (1.702 m)      Head Circumference       Peak Flow       Pain Score       Pain Loc       Pain Edu? Excl. in [address]? Physical Exam   Constitutional: She appears well-developed and well-nourished. HENT:   Head: Normocephalic and atraumatic. Eyes: Conjunctivae are normal.   Neck: Normal range of motion. Neck supple. Cardiovascular: Regular rhythm and normal pulses. Tachycardia present. Pulmonary/Chest: Effort normal and breath sounds normal. No respiratory distress. She has no wheezes. Abdominal: Soft. Bowel sounds are normal. She exhibits no distension. There is no tenderness. There is no guarding. Musculoskeletal: Normal range of motion. Neurological: She is alert. Skin: Skin is warm and dry. Capillary refill takes less than 2 seconds. Psychiatric: She has a normal mood and affect. Her behavior is normal. Thought content normal.   Nursing note and vitals reviewed.            I have reviewed and interpreted all of the currently available lab results from this visit (if applicable):  Results for orders placed or performed during the hospital encounter of 09/12/19   CBC Auto Differential   Result Value Ref Range    WBC 6.9 4.0 - 10.5 K/CU MM    RBC 4.27 4.2 - 5.4 M/CU MM    Hemoglobin 10.7 (L) 12.5 - 16.0 GM/DL    Hematocrit 34.5 (L) 37 - 47 %    MCV 80.8 78 - 100 FL    MCH 25.1 (L) 27 - 31 PG    MCHC 31.0 (L) 32.0 - 36.0 %    RDW 17.9 (H) 11.7 - 14.9 %    Platelets 848 345 - 714 K/CU MM    MPV 11.7 (H) 7.5 - 11.1 FL    Differential Type AUTOMATED DIFFERENTIAL     Segs Relative 78.2 (H) 36 - 66 %    Lymphocytes % 14.6 (L) 24 - 44 %    Monocytes % 6.0 (H) 0 - 4 %    Eosinophils % 0.3 0 - 3 %    Basophils % 0.6 0 - 1 %    Segs Absolute 5.4 K/CU MM    Lymphocytes Absolute 1.0 K/CU MM    Monocytes Absolute 0.4 K/CU MM    Eosinophils Absolute 0.0 K/CU MM    Basophils Absolute abnormality, consider lateral ischemia  Abnormal ECG  When compared with ECG of 12-SEP-2019 16:22,  Atrial fibrillation has replaced Atrial flutter  Questionable change in initial forces of Anterior leads  ST now depressed in Inferior leads  Nonspecific T wave abnormality no longer evident in Anterior leads        Radiographs (if obtained):    [] Radiologist's Report Reviewed:  CTA CHEST W CONTRAST   Preliminary Result   1. No evidence of pulmonary embolus. Mild enlargement of the pulmonary   artery which can be seen with pulmonary hypertension. 2. Masslike consolidation in the right lower lobe, 1.8 x 1.3 cm. Recommend   follow-up CT in 3 months to ensure resolution. 3. Trace pericardial fluid. 4. Mild cardiomegaly. 5. Coronary artery atherosclerosis. XR CHEST PORTABLE   Final Result   1. Emphysema. No superimposed acute pulmonary abnormality to account for   patient's chest pain. 2. Stable mild enlargement of the cardiac silhouette. EKG (if obtained): (All EKG's are interpreted by myself in the absence of a cardiologist)  EKG a flutter, with 2-1 conduction    Chart review shows recent radiographs:  No results found. MDM:  Patient seen and examined. EKG with a flutter, she is placed on Cardizem as well as amiodarone by cardiologist.  Labs significant for magnesium is 0.4, potassium of 2.8 both of which are replaced. D-dimer is elevated, CT PE negative. Patient to be admitted for further evaluation and management. All labs and imaging discussed with patient, all questions were answered, she is in agreement with plan of care. Clinical Impression:  1. Typical atrial flutter ([address]    2. Hypomagnesemia    3. Hypokalemia    4.  Pulmonary mass      Disposition referral (if applicable):  Obi Beltrán MD  [address] [place] [address]  [address] [id number] [phone]          Disposition medications (if applicable):  New Prescriptions    No medications on file

## 2019-09-12 NOTE — PROGRESS NOTES
Comments:  Reviewed and updated med list per patient and verified with claims  Patient states she did not take any of her medications today    To my knowledge the above medication history is accurate as of 9/12/2019 6:02 PM.   Gelacio Bright CPhT   9/12/2019 6:02 PM

## 2019-09-12 NOTE — ED NOTES
Bed: H-01  Expected date:   Expected time:   Means of arrival:   Comments:  Triage       Cherry Champion RN  09/12/19 7829

## 2019-09-13 PROBLEM — E44.0 MODERATE MALNUTRITION (HCC): Chronic | Status: ACTIVE | Noted: 2019-09-13

## 2019-09-13 LAB
ADENOVIRUS DETECTION BY PCR: NOT DETECTED
ALBUMIN SERPL-MCNC: 3 GM/DL (ref 3.4–5)
ALP BLD-CCNC: 114 IU/L (ref 40–129)
ALT SERPL-CCNC: 122 U/L (ref 10–40)
ANION GAP SERPL CALCULATED.3IONS-SCNC: 18 MMOL/L (ref 4–16)
ANION GAP SERPL CALCULATED.3IONS-SCNC: 20 MMOL/L (ref 4–16)
AST SERPL-CCNC: 265 IU/L (ref 15–37)
BACTERIA: ABNORMAL /HPF
BASOPHILS ABSOLUTE: 0 K/CU MM
BASOPHILS RELATIVE PERCENT: 0.3 % (ref 0–1)
BILIRUB SERPL-MCNC: 1.3 MG/DL (ref 0–1)
BILIRUBIN URINE: NEGATIVE MG/DL
BLOOD, URINE: ABNORMAL
BORDETELLA PERTUSSIS PCR: NOT DETECTED
BUN BLDV-MCNC: 17 MG/DL (ref 6–23)
BUN BLDV-MCNC: 17 MG/DL (ref 6–23)
CALCIUM SERPL-MCNC: 7.4 MG/DL (ref 8.3–10.6)
CALCIUM SERPL-MCNC: 7.7 MG/DL (ref 8.3–10.6)
CHLAMYDOPHILA PNEUMONIA PCR: NOT DETECTED
CHLORIDE BLD-SCNC: 100 MMOL/L (ref 99–110)
CHLORIDE BLD-SCNC: 101 MMOL/L (ref 99–110)
CLARITY: ABNORMAL
CO2: 16 MMOL/L (ref 21–32)
CO2: 20 MMOL/L (ref 21–32)
COLOR: ABNORMAL
CORONAVIRUS 229E PCR: NOT DETECTED
CORONAVIRUS HKU1 PCR: NOT DETECTED
CORONAVIRUS NL63 PCR: NOT DETECTED
CORONAVIRUS OC43 PCR: NOT DETECTED
CREAT SERPL-MCNC: 1.4 MG/DL (ref 0.6–1.1)
CREAT SERPL-MCNC: 1.4 MG/DL (ref 0.6–1.1)
DIFFERENTIAL TYPE: ABNORMAL
EKG ATRIAL RATE: 107 BPM
EKG ATRIAL RATE: 220 BPM
EKG ATRIAL RATE: 270 BPM
EKG DIAGNOSIS: NORMAL
EKG Q-T INTERVAL: 312 MS
EKG Q-T INTERVAL: 362 MS
EKG Q-T INTERVAL: 416 MS
EKG QRS DURATION: 70 MS
EKG QRS DURATION: 72 MS
EKG QRS DURATION: 74 MS
EKG QTC CALCULATION (BAZETT): 466 MS
EKG QTC CALCULATION (BAZETT): 468 MS
EKG QTC CALCULATION (BAZETT): 531 MS
EKG R AXIS: -42 DEGREES
EKG R AXIS: -43 DEGREES
EKG R AXIS: -52 DEGREES
EKG T AXIS: 107 DEGREES
EKG T AXIS: 48 DEGREES
EKG T AXIS: 90 DEGREES
EKG VENTRICULAR RATE: 100 BPM
EKG VENTRICULAR RATE: 135 BPM
EKG VENTRICULAR RATE: 98 BPM
EOSINOPHILS ABSOLUTE: 0 K/CU MM
EOSINOPHILS RELATIVE PERCENT: 0.1 % (ref 0–3)
ESTIMATED AVERAGE GLUCOSE: 151 MG/DL
GFR AFRICAN AMERICAN: 43 ML/MIN/1.73M2
GFR AFRICAN AMERICAN: 43 ML/MIN/1.73M2
GFR NON-AFRICAN AMERICAN: 36 ML/MIN/1.73M2
GFR NON-AFRICAN AMERICAN: 36 ML/MIN/1.73M2
GLUCOSE BLD-MCNC: 113 MG/DL (ref 70–99)
GLUCOSE BLD-MCNC: 126 MG/DL (ref 70–99)
GLUCOSE BLD-MCNC: 130 MG/DL (ref 70–99)
GLUCOSE BLD-MCNC: 143 MG/DL (ref 70–99)
GLUCOSE, URINE: NEGATIVE MG/DL
GRANULAR CASTS: 10 /LPF
HBA1C MFR BLD: 6.9 % (ref 4.2–6.3)
HCT VFR BLD CALC: 34.4 % (ref 37–47)
HEMOGLOBIN: 10.7 GM/DL (ref 12.5–16)
HUMAN METAPNEUMOVIRUS PCR: NOT DETECTED
IMMATURE NEUTROPHIL %: 0.7 % (ref 0–0.43)
INFLUENZA A BY PCR: NOT DETECTED
INFLUENZA A H1 (2009) PCR: NOT DETECTED
INFLUENZA A H1 PANDEMIC PCR: NOT DETECTED
INFLUENZA A H3 PCR: NOT DETECTED
INFLUENZA B BY PCR: NOT DETECTED
KETONES, URINE: NEGATIVE MG/DL
LEUKOCYTE ESTERASE, URINE: ABNORMAL
LV EF: 50 %
LV EF: 50 %
LVEF MODALITY: NORMAL
LVEF MODALITY: NORMAL
LYMPHOCYTES ABSOLUTE: 0.9 K/CU MM
LYMPHOCYTES RELATIVE PERCENT: 10.5 % (ref 24–44)
MAGNESIUM: 1.6 MG/DL (ref 1.8–2.4)
MAGNESIUM: 2.6 MG/DL (ref 1.8–2.4)
MCH RBC QN AUTO: 25.2 PG (ref 27–31)
MCHC RBC AUTO-ENTMCNC: 31.1 % (ref 32–36)
MCV RBC AUTO: 80.9 FL (ref 78–100)
MONOCYTES ABSOLUTE: 0.6 K/CU MM
MONOCYTES RELATIVE PERCENT: 6.7 % (ref 0–4)
MUCUS: ABNORMAL HPF
MYCOPLASMA PNEUMONIAE PCR: NOT DETECTED
NITRITE URINE, QUANTITATIVE: NEGATIVE
NUCLEATED RBC %: 0 %
PARAINFLUENZA 1 PCR: NOT DETECTED
PARAINFLUENZA 2 PCR: NOT DETECTED
PARAINFLUENZA 3 PCR: NOT DETECTED
PARAINFLUENZA 4 PCR: NOT DETECTED
PDW BLD-RTO: 18.1 % (ref 11.7–14.9)
PH, URINE: 5 (ref 5–8)
PHOSPHORUS: 3.6 MG/DL (ref 2.5–4.9)
PLATELET # BLD: 228 K/CU MM (ref 140–440)
PMV BLD AUTO: 12 FL (ref 7.5–11.1)
POTASSIUM SERPL-SCNC: 3.8 MMOL/L (ref 3.5–5.1)
POTASSIUM SERPL-SCNC: 4.3 MMOL/L (ref 3.5–5.1)
PROCALCITONIN: 0.13
PROTEIN UA: 30 MG/DL
RBC # BLD: 4.25 M/CU MM (ref 4.2–5.4)
RBC URINE: 8 /HPF (ref 0–6)
RHINOVIRUS ENTEROVIRUS PCR: NOT DETECTED
RSV PCR: NOT DETECTED
SEGMENTED NEUTROPHILS ABSOLUTE COUNT: 7.2 K/CU MM
SEGMENTED NEUTROPHILS RELATIVE PERCENT: 81.7 % (ref 36–66)
SODIUM BLD-SCNC: 137 MMOL/L (ref 135–145)
SODIUM BLD-SCNC: 138 MMOL/L (ref 135–145)
SPECIFIC GRAVITY UA: 1.05 (ref 1–1.03)
SPECIFIC GRAVITY UA: ABNORMAL (ref 1–1.03)
SQUAMOUS EPITHELIAL: 2 /HPF
TOTAL IMMATURE NEUTOROPHIL: 0.06 K/CU MM
TOTAL NUCLEATED RBC: 0 K/CU MM
TOTAL PROTEIN: 6.5 GM/DL (ref 6.4–8.2)
TRICHOMONAS: ABNORMAL /HPF
UROBILINOGEN, URINE: 1 MG/DL (ref 0.2–1)
WBC # BLD: 8.9 K/CU MM (ref 4–10.5)
WBC UA: 26 /HPF (ref 0–5)

## 2019-09-13 PROCEDURE — 82962 GLUCOSE BLOOD TEST: CPT

## 2019-09-13 PROCEDURE — 2140000000 HC CCU INTERMEDIATE R&B

## 2019-09-13 PROCEDURE — 87798 DETECT AGENT NOS DNA AMP: CPT

## 2019-09-13 PROCEDURE — 7100000000 HC PACU RECOVERY - FIRST 15 MIN

## 2019-09-13 PROCEDURE — B24BZZ4 ULTRASONOGRAPHY OF HEART WITH AORTA, TRANSESOPHAGEAL: ICD-10-PCS | Performed by: INTERNAL MEDICINE

## 2019-09-13 PROCEDURE — 93005 ELECTROCARDIOGRAM TRACING: CPT | Performed by: INTERNAL MEDICINE

## 2019-09-13 PROCEDURE — 81001 URINALYSIS AUTO W/SCOPE: CPT

## 2019-09-13 PROCEDURE — 36415 COLL VENOUS BLD VENIPUNCTURE: CPT

## 2019-09-13 PROCEDURE — 6360000002 HC RX W HCPCS: Performed by: INTERNAL MEDICINE

## 2019-09-13 PROCEDURE — 2580000003 HC RX 258: Performed by: INTERNAL MEDICINE

## 2019-09-13 PROCEDURE — 87486 CHLMYD PNEUM DNA AMP PROBE: CPT

## 2019-09-13 PROCEDURE — 80053 COMPREHEN METABOLIC PANEL: CPT

## 2019-09-13 PROCEDURE — 6370000000 HC RX 637 (ALT 250 FOR IP): Performed by: INTERNAL MEDICINE

## 2019-09-13 PROCEDURE — 84100 ASSAY OF PHOSPHORUS: CPT

## 2019-09-13 PROCEDURE — 80048 BASIC METABOLIC PNL TOTAL CA: CPT

## 2019-09-13 PROCEDURE — 7100000001 HC PACU RECOVERY - ADDTL 15 MIN

## 2019-09-13 PROCEDURE — 84145 PROCALCITONIN (PCT): CPT

## 2019-09-13 PROCEDURE — 93010 ELECTROCARDIOGRAM REPORT: CPT | Performed by: INTERNAL MEDICINE

## 2019-09-13 PROCEDURE — 6370000000 HC RX 637 (ALT 250 FOR IP): Performed by: GENERAL PRACTICE

## 2019-09-13 PROCEDURE — 85025 COMPLETE CBC W/AUTO DIFF WBC: CPT

## 2019-09-13 PROCEDURE — 87581 M.PNEUMON DNA AMP PROBE: CPT

## 2019-09-13 PROCEDURE — 93306 TTE W/DOPPLER COMPLETE: CPT

## 2019-09-13 PROCEDURE — 94761 N-INVAS EAR/PLS OXIMETRY MLT: CPT

## 2019-09-13 PROCEDURE — 87633 RESP VIRUS 12-25 TARGETS: CPT

## 2019-09-13 PROCEDURE — 87070 CULTURE OTHR SPECIMN AEROBIC: CPT

## 2019-09-13 PROCEDURE — 83735 ASSAY OF MAGNESIUM: CPT

## 2019-09-13 PROCEDURE — 6360000002 HC RX W HCPCS: Performed by: GENERAL PRACTICE

## 2019-09-13 PROCEDURE — 93312 ECHO TRANSESOPHAGEAL: CPT

## 2019-09-13 PROCEDURE — 87205 SMEAR GRAM STAIN: CPT

## 2019-09-13 PROCEDURE — 2580000003 HC RX 258: Performed by: GENERAL PRACTICE

## 2019-09-13 RX ORDER — DOXYCYCLINE HYCLATE 100 MG
100 TABLET ORAL EVERY 12 HOURS SCHEDULED
Status: DISCONTINUED | OUTPATIENT
Start: 2019-09-13 | End: 2019-09-17 | Stop reason: HOSPADM

## 2019-09-13 RX ORDER — ADENOSINE 3 MG/ML
6 INJECTION, SOLUTION INTRAVENOUS ONCE
Status: DISCONTINUED | OUTPATIENT
Start: 2019-09-13 | End: 2019-09-13

## 2019-09-13 RX ORDER — SODIUM CHLORIDE, SODIUM LACTATE, POTASSIUM CHLORIDE, CALCIUM CHLORIDE 600; 310; 30; 20 MG/100ML; MG/100ML; MG/100ML; MG/100ML
INJECTION, SOLUTION INTRAVENOUS CONTINUOUS
Status: DISCONTINUED | OUTPATIENT
Start: 2019-09-13 | End: 2019-09-13

## 2019-09-13 RX ORDER — MAGNESIUM SULFATE 4 G/50ML
4 INJECTION INTRAVENOUS ONCE
Status: COMPLETED | OUTPATIENT
Start: 2019-09-13 | End: 2019-09-13

## 2019-09-13 RX ADMIN — METOPROLOL TARTRATE 50 MG: 50 TABLET ORAL at 10:40

## 2019-09-13 RX ADMIN — ONDANSETRON 4 MG: 2 INJECTION INTRAMUSCULAR; INTRAVENOUS at 10:24

## 2019-09-13 RX ADMIN — DOXYCYCLINE HYCLATE 100 MG: 100 TABLET, COATED ORAL at 20:59

## 2019-09-13 RX ADMIN — SODIUM CHLORIDE, POTASSIUM CHLORIDE, SODIUM LACTATE AND CALCIUM CHLORIDE: 600; 310; 30; 20 INJECTION, SOLUTION INTRAVENOUS at 12:38

## 2019-09-13 RX ADMIN — ENOXAPARIN SODIUM 70 MG: 100 INJECTION SUBCUTANEOUS at 08:34

## 2019-09-13 RX ADMIN — METOPROLOL TARTRATE 50 MG: 50 TABLET ORAL at 21:00

## 2019-09-13 RX ADMIN — LATANOPROST 1 DROP: 50 SOLUTION/ DROPS OPHTHALMIC at 21:02

## 2019-09-13 RX ADMIN — CEFTRIAXONE 1 G: 1 INJECTION, POWDER, FOR SOLUTION INTRAMUSCULAR; INTRAVENOUS at 12:37

## 2019-09-13 RX ADMIN — Medication 10 ML: at 21:00

## 2019-09-13 RX ADMIN — DOXYCYCLINE HYCLATE 100 MG: 100 TABLET, COATED ORAL at 12:37

## 2019-09-13 RX ADMIN — MAGNESIUM SULFATE HEPTAHYDRATE 4 G: 80 INJECTION, SOLUTION INTRAVENOUS at 08:24

## 2019-09-13 RX ADMIN — AMIODARONE HYDROCHLORIDE 0.5 MG/MIN: 50 INJECTION, SOLUTION INTRAVENOUS at 06:06

## 2019-09-13 RX ADMIN — PANTOPRAZOLE SODIUM 40 MG: 40 TABLET, DELAYED RELEASE ORAL at 10:40

## 2019-09-13 RX ADMIN — POTASSIUM CHLORIDE AND SODIUM CHLORIDE: 450; 150 INJECTION, SOLUTION INTRAVENOUS at 05:30

## 2019-09-13 ASSESSMENT — PAIN SCALES - GENERAL
PAINLEVEL_OUTOF10: 0
PAINLEVEL_OUTOF10: 0

## 2019-09-13 NOTE — PROGRESS NOTES
Daily Progress Note     patient is awake alert  Feeling better  Remains in afib/flutter rate control  JANKI shows EF 50% and moderate MR and moderate to severe AS  Did not do CV --keep on [facility] for now  Need further workup   Correct K and mag  Recent diarrhea   Elevated LFT  Supportive care  Stop amiodarone keep on lopressor for now from cardiac stand       CT chest   Impression:        1. No evidence of pulmonary embolus.  Mild enlargement of the pulmonary  artery which can be seen with pulmonary hypertension. 2. Masslike consolidation in the right lower lobe, 1.8 x 1.3 cm.  Recommend  follow-up CT in 3 months to ensure resolution. 3. Trace pericardial fluid. 4. Mild cardiomegaly. 5. Coronary artery atherosclerosis. Objective:   /82   Pulse 85   Temp 96.9 °F (36.1 °C) (Oral)   Resp 20   Ht 5' 7\" (1.702 m)   Wt 161 lb 8 oz (73.3 kg)   SpO2 97%   BMI 25.29 kg/m²       Intake/Output Summary (Last 24 hours) at 9/13/2019 0940  Last data filed at 9/13/2019 0530  Gross per 24 hour   Intake 1695.84 ml   Output --   Net 1695.84 ml       Medications:   Scheduled Meds:   magnesium sulfate  4 g Intravenous Once    adenosine  6 mg Intravenous Once    metoprolol tartrate  50 mg Oral BID    latanoprost  1 drop Both Eyes Nightly    pantoprazole  40 mg Oral Daily    sodium chloride flush  10 mL Intravenous 2 times per day    insulin lispro  0-12 Units Subcutaneous TID WC    insulin lispro  0-6 Units Subcutaneous Nightly      Infusions:   lactated ringers      dextrose      amiodarone 450mg/250ml D5W infusion 0.5 mg/min (09/13/19 0606)      PRN Meds:  diphenoxylate-atropine, sodium chloride flush, magnesium hydroxide, ondansetron, glucose, dextrose, glucagon (rDNA), dextrose       Physical Exam:  Vitals:    09/13/19 0908   BP: 112/82   Pulse: 85   Resp: 20   Temp:    SpO2: 97%        General: awake alert   Chest: Nontender  Cardiac: afib   Lungs:Clear to auscultation and percussion.   Abdomen: Soft,

## 2019-09-13 NOTE — PROCEDURES
Moderate mitral regurgitation noted in the central jet. Posterior  mitral valve leaflet is restricted and calcified. Anterior valve  leaflet appears normal.  4.  EF is around 50% range present. 5.  Aortic valve is sclerotic and stenotic, appears almost severe aortic  stenosis noted with mild aortic regurgitation present. 6.  Interatrial septum is bowing to the right side suggestive of  increased pressure in the left side. 7.  Tricuspid and pulmonic valves are grossly normal.    The patient tolerated the procedure well. No complications noted. The patient appears in slow flutter. The patient is not cardioverted at  this time because of other valvular heart disease and other etiologies. The plan is for rate control and anticoagulation.     Madhavi Cloud MD    D: 09/13/2019 8:46:44       T: 09/13/2019 9:45:23     [email or web address]  Job#: [id number]     Doc#: [id number]    CC:

## 2019-09-13 NOTE — PROGRESS NOTES
INTERNAL MEDICINE PROGRESS NOTE        Claudene Epley   1935   Primary Care Physician:  Hannah Servin MD  Admit Date: 9/12/2019     Subjective:   Patient is feeling good. No new issues. No chest pain or shortness of breath. No palpitations. No headache. No loose BM in this am. She denied abdominal cramp or pain.        Objective:   /82   Pulse 93   Temp 96.9 °F (36.1 °C) (Oral)   Resp 18   Ht 5' 7\" (1.702 m)   Wt 161 lb 8 oz (73.3 kg)   SpO2 100%   BMI 25.29 kg/m²    General appearance: alert, appears stated age and cooperative  Head: Normocephalic, without obvious abnormality, atraumatic  Neck: no adenopathy and supple, symmetrical, trachea midline  Lungs: clear to auscultation bilaterally  Heart: irregular rate and rhythm and S1, S2 normal  Abdomen: soft, non-tender; bowel sounds normal; no masses,  no organomegaly  Extremities: no clubbing, cyanosis or edema  Neurologic: Grossly normal    Data Review  Lab Results   Component Value Date     09/13/2019    K 4.3 09/13/2019     09/13/2019    CO2 16 (L) 09/13/2019    CREATININE 1.4 (H) 09/13/2019    BUN 17 09/13/2019    CALCIUM 7.4 (L) 09/13/2019     Lab Results   Component Value Date    WBC 8.9 09/13/2019    HGB 10.7 (L) 09/13/2019    HCT 34.4 (L) 09/13/2019    MCV 80.9 09/13/2019     09/13/2019     INR/Prothrombin Time      Meds:    magnesium sulfate  4 g Intravenous Once    adenosine  6 mg Intravenous Once    metoprolol tartrate  50 mg Oral BID    latanoprost  1 drop Both Eyes Nightly    pantoprazole  40 mg Oral Daily    sodium chloride flush  10 mL Intravenous 2 times per day    enoxaparin  40 mg Subcutaneous Daily    insulin lispro  0-12 Units Subcutaneous TID WC    insulin lispro  0-6 Units Subcutaneous Nightly     PRN Meds: diphenoxylate-atropine, sodium chloride flush, magnesium hydroxide, ondansetron, glucose, dextrose, glucagon (rDNA), dextrose    Assessment/Plan:   Patient Active Hospital Problem

## 2019-09-13 NOTE — PROGRESS NOTES
Nutrition Assessment    Type and Reason for Visit: Initial, Positive Nutrition Screen(wt loss, poor intake, poor dentition)    Nutrition Recommendations:   · Continue current diet  · Begin low aleshia high protein oral nutrition supplement tid, between meals as needed    Nutrition Assessment: High nutrition risk with moderate malnutrition AEB muscle/fat wasting. 30 lb recent wt loss reported, no wt history available per Epic. Decreased appetite/intake and diarrhea PTA. Diarrhea improved noted. 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 Feeding self and tolerating po fluids noted. Pt sleeping during room visit. Malnutrition Assessment:  · Malnutrition Status: Meets the criteria for moderate malnutrition  · Context: Chronic illness  · Findings of the 6 clinical characteristics of malnutrition (Minimum of 2 out of 6 clinical characteristics is required to make the diagnosis of moderate or severe Protein Calorie Malnutrition based on AND/ASPEN Guidelines):  1. Energy Intake-Less than or equal to 75% of estimated energy requirement, Greater than or equal to 7 days    2. Weight Loss-Unable to assess,    3. Fat Loss-Mild subcutaneous fat loss, Orbital  4. Muscle Loss-Mild muscle mass loss, Temples (temporalis muscle)  5. Fluid Accumulation-No significant fluid accumulation,    6.  Strength-Not measured    Nutrition Risk Level: High    Nutrient Needs:  · Estimated Daily Total Kcal: 0833-7630 ([place]-St Jeor)  · Estimated Daily Protein (g): 61-79 (1-1.3 g/kg IBW)  · Estimated Daily Total Fluid (ml/day): 1132-0389 (1 ml/kcal)    Nutrition Diagnosis:   · Problem:  Moderate malnutrition, In context of chronic illness  · Etiology: related to Insufficient energy/nutrient consumption     Signs and symptoms:  as evidenced by Diet history of poor intake, Mild loss of subcutaneous fat, Mild muscle loss    Objective Information:  · Wound Type: None  · Current Nutrition Therapies:  · Oral Diet Orders: Cardiac · Oral Diet intake: Unable to assess  · Oral Nutrition Supplement (ONS) Orders: None  · Anthropometric Measures:  · Ht: 5' 7\" (170.2 cm)   · Current Body Wt: 161 lb 8 oz (73.3 kg)  · Admission Body Wt: 155 lb 3.2 oz (70.4 kg)  · Usual Body Wt: (n/a)  · % Weight Change:  ABELARDO  · Ideal Body Wt: 135 lb (61.2 kg), % [place] Body 119  · BMI Classification: BMI 25.0 - 29.9 Overweight(25.3)    Nutrition Interventions:   Continue current diet, Start ONS  Continued Inpatient Monitoring, Education not appropriate at this time, Coordination of Care    Nutrition Evaluation:   · Evaluation: Goals set   · Goals:   Patient will meet at least  50% of estimated nutrient needs with po diet and ONS during los    · Monitoring: Meal Intake, Supplement Intake, Diet Tolerance, Weight, Pertinent Labs, Diarrhea      Electronically signed by Tone Kidd RD, LD on 9/13/19 at 3:57 PM    Contact Number: [id number]

## 2019-09-13 NOTE — H&P
[address], [address] [place] [address]                              HISTORY AND PHYSICAL    PATIENT NAME: Fatemeh Perry                   :        1935  MED REC NO:   [id number]                          ROOM:       3120  ACCOUNT NO:   [de-identified]                           ADMIT DATE: 2019  PROVIDER:     Cecilia Walker    REASON FOR ADMISSION:  Tachycardia, atrial fibrillation and flutter. HISTORY OF PRESENT ILLNESS:  The patient is an 26-year-old female who  was seen in office today for her regular check. The patient was found  to have a more than 30-pound of weight loss since last visit. The  patient also has a history of decreased appetite and increased diarrhea. The patient said that for her diarrhea, she started Questran about a  couple of months ago. Since then, her appetite has gone down. When I  examined her in the office, she was tachycardic with heart rate of 132. EKG was obtained at that time, possibly had atrial flutter versus sinus  tachycardia. The patient was sent to the cardiologist and from where  the patient was sent to the emergency room and now finally admitted to  hospital.  At this time, the patient complains of some fatigue and  tiredness. She does not have any history of chest pain or palpitations. No headache or dizziness. No nausea or vomiting. Denies any swelling  in the legs. PAST MEDICAL HISTORY:  Significant for hypertension, also has a history  of diabetes, has a history of chronic diarrhea, also history of peptic  ulcer disease in the past.    ALLERGIES:  The patient is allergic to ZINC and PENICILLIN. HOME MEDICATIONS:  Include Travatan eye drops 0.05 one drop in both eyes  daily. Also, the patient was on atenolol 50 mg daily.   The patient was  also on lisinopril 10 mg daily, Protonix 40 mg daily, Questran powder  one packet daily, Lasix 20 mg daily, Lomotil one tablet every 4 hours as  needed and also atorvastatin 10 mg daily. REVIEW OF SYSTEMS:  CONSTITUTIONAL:  Positive for fatigue and tiredness. HEENT:  Denies any headache at this time. Occasional dizziness. RS:  Negative for any shortness of breath. CARDIOVASCULAR SYSTEM:  Denies any history of chest pain at this time. GI:  Decreased appetite and diarrhea. :  Denies any urinary complaints. ENDOCRINE:  History of diabetes for which the patient is not on any  medication at this time. NEUROLOGIC:  Denies any focal weakness. PSYCHIATRIC:  Negative. ALLERGY/IMMUNOLOGY:  Negative. FAMILY HISTORY:  Not significant. SOCIAL HISTORY:  The patient does not smoke at this time. She lives  with her  at home. The patient has a good family support because  of the daughter. PHYSICAL EXAMINATION:  GENERAL:  Shows that the patient is not in distress at this time. VITAL SIGNS:  The patient's vitals at the time of admission show  temperature 98 degrees Fahrenheit, pulse is 130 per minute, respiratory  rate is 19, blood pressure is 105/73, O2 is 97%. HEENT:  Normocephalic head. No sinus tenderness. NECK:  Without any stiffness or thyromegaly. RS:  The patient has a fair air entry. No wheezing or rhonchi. CARDIOVASCULAR SYSTEM:  S1 and S2 present. Rate and rhythm irregular. The patient is tachycardic. ABDOMEN:  Soft and nontender. Bowel sounds are present. EXTREMITIES:  Without any clubbing, cyanosis, or edema. CNS:  Nonfocal.    INVESTIGATIONS:  The patient's CT chest positive for mass-like lesion in  the right lower lobe. Also, x-ray chest does not show any acute  process. Troponin T is less than 0.010. TSH without reflex is 5.2. The patient's free T4 is 1.4. The patient's proBNP is 2749. The  patient's PT is 20.8, INR is 1.81, D-dimer is 891. Magnesium was 0.4. Also, the patient has a potassium of 2.8.   The patient's sodium is 137,  chloride is 100, CO2 is 20, BUN is 15, creatinine is 1.3, glucose is  110. The patient's WBC is 6.9, hemoglobin is 10.9, hematocrit is 34.5,  platelet count is 730. ASSESSMENT:  1. Tachycardia, secondary to atrial flutter and fibrillation. 2.  Severe hypomagnesemia, mostly secondary to diarrhea. 3.  Hypopotassemia, mostly secondary to hypomagnesemia. 4.  Debility. 5.  Weight loss. 6.  Abnormal CT scan of the chest.    PLAN:  At this time, the patient is admitted to the hospital.  I have  discussed with cardiologist on multiple occasions, also discussed with  ER physician. I examined this patient in the hospital and then came  back again and saw the patient in the emergency room. The patient's  overall condition is fair at this time. Total time spent in the  patient's care is more than one hour.         LUCIA BALTAZAR    D: 09/12/2019 23:08:12       T: 09/13/2019 0:58:49     [email or web address]  Job#: [id number]     Doc#: [id number]    CC:

## 2019-09-14 ENCOUNTER — APPOINTMENT (OUTPATIENT)
Dept: CT IMAGING | Age: 84
DRG: 287 | End: 2019-09-14
Payer: MEDICARE

## 2019-09-14 LAB
ALBUMIN SERPL-MCNC: 2.6 GM/DL (ref 3.4–5)
ALP BLD-CCNC: 106 IU/L (ref 40–128)
ALT SERPL-CCNC: 205 U/L (ref 10–40)
ANION GAP SERPL CALCULATED.3IONS-SCNC: 13 MMOL/L (ref 4–16)
AST SERPL-CCNC: 243 IU/L (ref 15–37)
BASOPHILS ABSOLUTE: 0 K/CU MM
BASOPHILS RELATIVE PERCENT: 0.2 % (ref 0–1)
BILIRUB SERPL-MCNC: 0.8 MG/DL (ref 0–1)
BUN BLDV-MCNC: 18 MG/DL (ref 6–23)
CALCIUM SERPL-MCNC: 7.7 MG/DL (ref 8.3–10.6)
CEA: 9.9 NG/ML
CHLORIDE BLD-SCNC: 100 MMOL/L (ref 99–110)
CO2: 22 MMOL/L (ref 21–32)
CREAT SERPL-MCNC: 1.3 MG/DL (ref 0.6–1.1)
DIFFERENTIAL TYPE: ABNORMAL
EOSINOPHILS ABSOLUTE: 0 K/CU MM
EOSINOPHILS RELATIVE PERCENT: 0.1 % (ref 0–3)
ERYTHROCYTE SEDIMENTATION RATE: 37 MM/HR (ref 0–30)
GFR AFRICAN AMERICAN: 47 ML/MIN/1.73M2
GFR NON-AFRICAN AMERICAN: 39 ML/MIN/1.73M2
GLUCOSE BLD-MCNC: 111 MG/DL (ref 70–99)
GLUCOSE BLD-MCNC: 121 MG/DL (ref 70–99)
GLUCOSE BLD-MCNC: 124 MG/DL (ref 70–99)
GLUCOSE BLD-MCNC: 330 MG/DL (ref 70–99)
GLUCOSE BLD-MCNC: 94 MG/DL (ref 70–99)
HCT VFR BLD CALC: 34.3 % (ref 37–47)
HEMOGLOBIN: 10.5 GM/DL (ref 12.5–16)
IMMATURE NEUTROPHIL %: 0.5 % (ref 0–0.43)
LYMPHOCYTES ABSOLUTE: 1.1 K/CU MM
LYMPHOCYTES RELATIVE PERCENT: 7.6 % (ref 24–44)
MAGNESIUM: 2 MG/DL (ref 1.8–2.4)
MCH RBC QN AUTO: 24.6 PG (ref 27–31)
MCHC RBC AUTO-ENTMCNC: 30.6 % (ref 32–36)
MCV RBC AUTO: 80.3 FL (ref 78–100)
MONOCYTES ABSOLUTE: 0.6 K/CU MM
MONOCYTES RELATIVE PERCENT: 3.9 % (ref 0–4)
NUCLEATED RBC %: 0 %
PDW BLD-RTO: 18 % (ref 11.7–14.9)
PHOSPHORUS: 3.7 MG/DL (ref 2.5–4.9)
PLATELET # BLD: 258 K/CU MM (ref 140–440)
PMV BLD AUTO: 11.9 FL (ref 7.5–11.1)
POTASSIUM SERPL-SCNC: 3.8 MMOL/L (ref 3.5–5.1)
RBC # BLD: 4.27 M/CU MM (ref 4.2–5.4)
SEGMENTED NEUTROPHILS ABSOLUTE COUNT: 12.6 K/CU MM
SEGMENTED NEUTROPHILS RELATIVE PERCENT: 87.7 % (ref 36–66)
SODIUM BLD-SCNC: 135 MMOL/L (ref 135–145)
TOTAL IMMATURE NEUTOROPHIL: 0.07 K/CU MM
TOTAL NUCLEATED RBC: 0 K/CU MM
TOTAL PROTEIN: 5.9 GM/DL (ref 6.4–8.2)
WBC # BLD: 14.4 K/CU MM (ref 4–10.5)

## 2019-09-14 PROCEDURE — 36415 COLL VENOUS BLD VENIPUNCTURE: CPT

## 2019-09-14 PROCEDURE — 80053 COMPREHEN METABOLIC PANEL: CPT

## 2019-09-14 PROCEDURE — 6370000000 HC RX 637 (ALT 250 FOR IP): Performed by: INTERNAL MEDICINE

## 2019-09-14 PROCEDURE — 84100 ASSAY OF PHOSPHORUS: CPT

## 2019-09-14 PROCEDURE — 83735 ASSAY OF MAGNESIUM: CPT

## 2019-09-14 PROCEDURE — 94761 N-INVAS EAR/PLS OXIMETRY MLT: CPT

## 2019-09-14 PROCEDURE — 6370000000 HC RX 637 (ALT 250 FOR IP): Performed by: GENERAL PRACTICE

## 2019-09-14 PROCEDURE — 2140000000 HC CCU INTERMEDIATE R&B

## 2019-09-14 PROCEDURE — 74176 CT ABD & PELVIS W/O CONTRAST: CPT

## 2019-09-14 PROCEDURE — 99233 SBSQ HOSP IP/OBS HIGH 50: CPT | Performed by: INTERNAL MEDICINE

## 2019-09-14 PROCEDURE — 85025 COMPLETE CBC W/AUTO DIFF WBC: CPT

## 2019-09-14 PROCEDURE — 82378 CARCINOEMBRYONIC ANTIGEN: CPT

## 2019-09-14 PROCEDURE — 82962 GLUCOSE BLOOD TEST: CPT

## 2019-09-14 PROCEDURE — 85652 RBC SED RATE AUTOMATED: CPT

## 2019-09-14 PROCEDURE — 2580000003 HC RX 258: Performed by: INTERNAL MEDICINE

## 2019-09-14 PROCEDURE — 6360000002 HC RX W HCPCS: Performed by: INTERNAL MEDICINE

## 2019-09-14 PROCEDURE — 2580000003 HC RX 258: Performed by: GENERAL PRACTICE

## 2019-09-14 RX ORDER — SODIUM CHLORIDE 9 MG/ML
INJECTION, SOLUTION INTRAVENOUS CONTINUOUS
Status: DISCONTINUED | OUTPATIENT
Start: 2019-09-14 | End: 2019-09-14

## 2019-09-14 RX ADMIN — INSULIN LISPRO 8 UNITS: 100 INJECTION, SOLUTION INTRAVENOUS; SUBCUTANEOUS at 12:25

## 2019-09-14 RX ADMIN — APIXABAN 5 MG: 5 TABLET, FILM COATED ORAL at 22:06

## 2019-09-14 RX ADMIN — DOXYCYCLINE HYCLATE 100 MG: 100 TABLET, COATED ORAL at 22:06

## 2019-09-14 RX ADMIN — LATANOPROST 1 DROP: 50 SOLUTION/ DROPS OPHTHALMIC at 22:07

## 2019-09-14 RX ADMIN — APIXABAN 5 MG: 5 TABLET, FILM COATED ORAL at 10:16

## 2019-09-14 RX ADMIN — SODIUM CHLORIDE: 9 INJECTION, SOLUTION INTRAVENOUS at 10:29

## 2019-09-14 RX ADMIN — PANTOPRAZOLE SODIUM 40 MG: 40 TABLET, DELAYED RELEASE ORAL at 10:17

## 2019-09-14 RX ADMIN — DOXYCYCLINE HYCLATE 100 MG: 100 TABLET, COATED ORAL at 10:17

## 2019-09-14 RX ADMIN — CEFTRIAXONE 1 G: 1 INJECTION, POWDER, FOR SOLUTION INTRAMUSCULAR; INTRAVENOUS at 13:00

## 2019-09-14 RX ADMIN — Medication 10 ML: at 22:07

## 2019-09-14 RX ADMIN — METOPROLOL TARTRATE 25 MG: 25 TABLET ORAL at 22:06

## 2019-09-14 ASSESSMENT — PAIN SCALES - GENERAL
PAINLEVEL_OUTOF10: 0

## 2019-09-14 NOTE — CONSULTS
[address], [address] [facility]                                  CONSULTATION    PATIENT NAME: Tino Barnes                   :        1935  MED REC NO:   [id number]                          ROOM:       3120  ACCOUNT NO:   [de-identified]                           ADMIT DATE: 2019  PROVIDER:     Christina Ngo MD    CONSULT DATE:  2019    HISTORY OF PRESENT ILLNESS:  The patient is an 67-year-old lady with  history of diabetes, hypertension, hyperlipidemia, who was admitted to  the hospital as she was found to be tachycardic and was found to be in  atrial fibrillation with rapid ventricular response. She also has  30-pound weight loss. The patient had diarrhea and was given Questran  about a couple of months ago and since then, her appetite has gone down  and according to the patient, she has lost weight. She denies any  significant cough. She denies any fever or chills. She denies any  nausea or vomiting. She denies any chest pains. While in the hospital,  she had CTA of the chest, which showed no evidence of PE, but there was  mass-like consolidation in the right lower lobe measuring 1.8 x 1.3 cm  in size. PAST MEDICAL HISTORY:  Significant for hypertension, hyperlipidemia, and  diabetes. PAST SURGICAL HISTORY:  Remarkable for tonsillectomy, adenoidectomy,  hysterectomy, abdominal exploration surgery. FAMILY HISTORY:  Noncontributory. SOCIAL HISTORY:  Reveals that she quit smoking several years ago, but  has a history of smoking in the past.  She drinks alcohol off and on. No history of drug abuse. MEDICATIONS:  Reviewed. ALLERGIES:  She is allergic to ZINC and PENICILLIN. REVIEW OF SYSTEMS:  A 10- to 14-point review of systems were reviewed  and are negative except for what is mentioned in the history of present  illness.     PHYSICAL EXAMINATION:  GENERAL:  The patient is awake
[address] [place] [address], [address] [facility]                                  CONSULTATION    PATIENT NAME: Yamil Nielson                   :        1935  MED REC NO:   [id number]                          ROOM:       3120  ACCOUNT NO:   [de-identified]                           ADMIT DATE: 2019  PROVIDER:     [place] MD    CONSULT DATE:  2019    INDICATION:  Atrial fibrillation. HISTORY OF PRESENT ILLNESS:  This is an 26-year-old female patient,  patient of Dr. Tanja Bojorquez. She was in the office today. The patient has had  AFib with RVR present. She is not sure how long she has been in AFib  also. The rate was elevated in the 100s and 130s. Right now she is  chronically stable but she is in AFib. She is not sure how long she has  been in AFib. PAST MEDICAL HISTORY:  Hypertension, diabetes, hyperlipidemia present,  and history of GERD present. PAST SURGICAL HISTORY:  Gallbladder surgery, hernia repair, hysterectomy  done, and tonsillectomy. SOCIAL HISTORY:  She does not smoke, does not drink. MEDICATIONS AT HOME:  She is on Lasix 20 mg a day, Lipitor 10 mg a day,  lisinopril 10 mg a day, Lomotil, Protonix, and Travatan. PHYSICAL EXAMINATION:  GENERAL:  The patient is awake, alert, and answering questions, not in  acute distress. VITAL SIGNS:  Temperature is afebrile, pulse is in 120s, _____ 100s. HEENT:  Head is normocephalic and atraumatic. Pupils are equal and  reactive to light. CHEST:  Equal expansion. LUNGS:  Clear to auscultation. No wheezing or rhonchi. HEART:  Irregularly irregular. ABDOMEN:  Soft and nontender. Bowel sounds are present. No  hepatosplenomegaly or guarding appreciated. EXTREMITIES:  No cyanosis or clubbing noted. NEUROLOGIC:  Cranial nerves II through XII are grossly intact. EKG shows AFib with RVR present.     IMPRESSION:  This is an 26-year-old female patient who
[facility] --[id number]
Shruthi Combe [id number]
insulin lispro  0-6 Units Subcutaneous Nightly     Continuous Infusions:   lactated ringers      dextrose       PRN Meds:.diphenoxylate-atropine, sodium chloride flush, magnesium hydroxide, ondansetron, glucose, dextrose, glucagon (rDNA), dextrose    Allergies:  Zinc and Pcn [penicillins]    Family History:   History reviewed. No pertinent family history. Physical Exam:    Vitals: /83   Pulse 105   Temp 97.4 °F (36.3 °C) (Oral)   Resp 20   Ht 5' 7\" (1.702 m)   Wt 161 lb 8 oz (73.3 kg)   SpO2 97%   BMI 25.29 kg/m²     General appearance: awake and answering questions appropriately  HEENT: Head: Normal, normocephalic, atraumatic. Neck: supple, symmetrical, trachea midline  Cardiovascular: S1 and S2: normal / no rub  Pulmonary: diminished lung sounds bilaterally  Abdomen:  soft / non-tender   Extremities:  + edema to bilateral lower legs     CBC:   Recent Labs     09/12/19 1710 09/13/19  0452   WBC 6.9 8.9   HGB 10.7* 10.7*    228     BMP:    Recent Labs     09/12/19 1710 09/13/19 0452    137   K 2.8  K CALLED TO NICKY OLMSTEAD @ [id number] BY ECOPEN MLT  RESULTS READ BACK  * 4.3    101   CO2 20* 16*   BUN 15 17   CREATININE 1.3* 1.4*   GLUCOSE 110* 130*     Hepatic:   Recent Labs     09/12/19 1710 09/13/19  0452   AST 23 265*   ALT 17 122*   BILITOT 1.3* 1.3*   ALKPHOS 98 114     Troponin: No results for input(s): TROPONINI in the last 72 hours. Mg, Phos:   Recent Labs     09/12/19 1710 09/13/19  0452   MG 0.4* 1.6*   PHOS  --  3.6       ABGs: No results found for: PHART, PO2ART, WOB1CWX  INR:   Recent Labs     09/12/19 1710   INR 1.81     -----------------------------------------------------------------      Assessment and Recommendations     Impression   1.   HORTENCIA   -likely multi-factorial etiology: intravascular volume depletion secondary   To chronic diarrhea as well as hemodynamic instability secondary to   Atrial fib / flutter with probable diminished renal

## 2019-09-14 NOTE — PROGRESS NOTES
Objective:   BP 94/61   Pulse 86   Temp 97.8 °F (36.6 °C) (Oral)   Resp 18   Ht 5' 7\" (1.702 m)   Wt 161 lb 8 oz (73.3 kg)   SpO2 98%   BMI 25.29 kg/m²       Intake/Output Summary (Last 24 hours) at 9/14/2019 1123  Last data filed at 9/13/2019 1443  Gross per 24 hour   Intake --   Output 900 ml   Net -900 ml       Medications:   Scheduled Meds:   apixaban  5 mg Oral BID    metoprolol tartrate  25 mg Oral BID    cefTRIAXone (ROCEPHIN) IV  1 g Intravenous Q24H    doxycycline hyclate  100 mg Oral 2 times per day    latanoprost  1 drop Both Eyes Nightly    pantoprazole  40 mg Oral Daily    sodium chloride flush  10 mL Intravenous 2 times per day    insulin lispro  0-12 Units Subcutaneous TID WC    insulin lispro  0-6 Units Subcutaneous Nightly      Infusions:   sodium chloride 50 mL/hr at 09/14/19 1029    dextrose        PRN Meds:  diphenoxylate-atropine, sodium chloride flush, magnesium hydroxide, ondansetron, glucose, dextrose, glucagon (rDNA), dextrose       Physical Exam:  Vitals:    09/14/19 0949   BP: 94/61   Pulse: 86   Resp:    Temp:    SpO2:         General: awake alert   Chest: Nontender  Cardiac: sinus   Lungs:Clear to auscultation and percussion. Abdomen: Soft, NT, ND, +BS  Extremities: no edema   Vascular:  Equal 2+ peripheral pulses.         Lab Data:  CBC:   Recent Labs     09/12/19 1710 09/13/19 0452 09/14/19 0452   WBC 6.9 8.9 14.4*   HGB 10.7* 10.7* 10.5*   HCT 34.5* 34.4* 34.3*   MCV 80.8 80.9 80.3    228 258     BMP:   Recent Labs     09/13/19  0452 09/13/19  1304 09/14/19 0452    138 135   K 4.3 3.8 3.8    100 100   CO2 16* 20* 22   PHOS 3.6  --  3.7   BUN 17 17 18   CREATININE 1.4* 1.4* 1.3*     LIVER PROFILE:   Recent Labs     09/12/19  1710 09/13/19 0452 09/14/19  0452   AST 23 265* 243*   ALT 17 122* 205*   BILITOT 1.3* 1.3* 0.8   ALKPHOS 98 114 106     PT/INR:   Recent Labs     09/12/19  1710   PROTIME 20.8*   INR 1.81     APTT:   Recent Labs 09/12/19  1710   APTT 36.1*     BNP:  No results for input(s): BNP in the last 72 hours.       Assessment:  Patient Active Problem List    Diagnosis Date Noted    Moderate malnutrition ([facility] 09/13/2019    Atrial flutter ([facility] 09/12/2019    Perforated chronic duodenal ulcer ([facility] 09/14/2015    Bile peritonitis ([facility] 09/14/2015    Perforated duodenal ulcer ([facility]        Brianne Bai MD 9/14/2019 11:23 AM

## 2019-09-14 NOTE — PROGRESS NOTES
Nephrology Progress Note  9/14/2019 2:41 PM        Subjective:   Admit Date: 9/12/2019  PCP: Jackson Stephens MD    Interval History: doing well     Diet: better    ROS:  No sob, eating better     Data:     Current med's:    apixaban  5 mg Oral BID    metoprolol tartrate  25 mg Oral BID    cefTRIAXone (ROCEPHIN) IV  1 g Intravenous Q24H    doxycycline hyclate  100 mg Oral 2 times per day    latanoprost  1 drop Both Eyes Nightly    pantoprazole  40 mg Oral Daily    sodium chloride flush  10 mL Intravenous 2 times per day    insulin lispro  0-12 Units Subcutaneous TID WC    insulin lispro  0-6 Units Subcutaneous Nightly      sodium chloride 50 mL/hr at 09/14/19 1029    dextrose           I/O last 3 completed shifts:  In: -   Out: 900 [Urine:900]    CBC:   Recent Labs     09/12/19  1710 09/13/19  0452 09/14/19  0452   WBC 6.9 8.9 14.4*   HGB 10.7* 10.7* 10.5*    228 258          Recent Labs     09/13/19  0452 09/13/19  1304 09/14/19  0452    138 135   K 4.3 3.8 3.8    100 100   CO2 16* 20* 22   BUN 17 17 18   CREATININE 1.4* 1.4* 1.3*   GLUCOSE 130* 143* 111*       Lab Results   Component Value Date    CALCIUM 7.7 (L) 09/14/2019    PHOS 3.7 09/14/2019       Objective:     Vitals: BP (!) 138/109   Pulse 91   Temp 98.5 °F (36.9 °C) (Oral)   Resp 21   Ht 5' 7\" (1.702 m)   Wt 161 lb 8 oz (73.3 kg)   SpO2 98%   BMI 25.29 kg/m²     General appearance:  No distress  HEENT:  Thin, + pallor  Neck:  supple  Lungs:  CTA  Heart:  Seems RRr  Abdomen: soft  Extremities:  No  Edema       Problem List :         Impression :     1. HORTENCIA/ CKd stage 2- better - BP better does not seem volume depleted- likely from relative volume depletion/ NSAId / contrast etc   2. underlying DM/HTN/ arrhythmia     Recommendation/Plan  :     1. D/C IVF  2. Po food /fluid  3. Manual  BP  4. She is with DOAC  5. revisit abx   6.  Bmp in am       Willis Frankel MD

## 2019-09-14 NOTE — PROGRESS NOTES
IM Progress Note  9/14/2019 11:38 AM  Subjective:   Admit Date: 9/12/2019  PCP: Rosalinda Duff MD  Chief complaint- fatigue, fast HR      Interval History: appears this am to have had spont conversion to NSR. Denies sob, has minimal cough, no cp, fever, chills. Data:   Scheduled Meds:   apixaban  5 mg Oral BID    metoprolol tartrate  25 mg Oral BID    cefTRIAXone (ROCEPHIN) IV  1 g Intravenous Q24H    doxycycline hyclate  100 mg Oral 2 times per day    latanoprost  1 drop Both Eyes Nightly    pantoprazole  40 mg Oral Daily    sodium chloride flush  10 mL Intravenous 2 times per day    insulin lispro  0-12 Units Subcutaneous TID WC    insulin lispro  0-6 Units Subcutaneous Nightly     Continuous Infusions:   sodium chloride 50 mL/hr at 09/14/19 1029    dextrose       PRN Meds:diphenoxylate-atropine, sodium chloride flush, magnesium hydroxide, ondansetron, glucose, dextrose, glucagon (rDNA), dextrose  I/O last 3 completed shifts:  In: -   Out: 900 [Urine:900]  CBC:   Recent Labs     09/12/19  1710 09/13/19  0452 09/14/19  0452   WBC 6.9 8.9 14.4*   HGB 10.7* 10.7* 10.5*    228 258     BMP:    Recent Labs     09/13/19  0452 09/13/19  1304 09/14/19  0452    138 135   K 4.3 3.8 3.8    100 100   CO2 16* 20* 22   BUN 17 17 18   CREATININE 1.4* 1.4* 1.3*   GLUCOSE 130* 143* 111*     Hepatic:   Recent Labs     09/12/19  1710 09/13/19  0452 09/14/19  0452   AST 23 265* 243*   ALT 17 122* 205*   BILITOT 1.3* 1.3* 0.8   ALKPHOS 98 114 106     Troponin: No results for input(s): TROPONINI in the last 72 hours. BNP: No results for input(s): BNP in the last 72 hours.   INR:   Recent Labs     09/12/19  1710   INR 1.81       Objective:   Vitals: BP (!) 97/58   Pulse 81   Temp 97.5 °F (36.4 °C) (Oral)   Resp 18   Ht 5' 7\" (1.702 m)   Wt 161 lb 8 oz (73.3 kg)   SpO2 98%   BMI 25.29 kg/m²   General appearance: alert and cooperative with exam  HEENT: Head: Normocephalic, no lesions, without obvious abnormality. Eye: Normal external eye, conjunctiva, lids cornea, VENANCIO. Nose: Normal external nose, mucus membranes and septum. Pharynx: Dental Hygiene adequate. Normal buccal mucosa. Normal pharynx. Neck: no adenopathy, supple, symmetrical, trachea midline and thyroid not enlarged, symmetric, no tenderness/mass/nodules  Lungs: clear to auscultation bilaterally, diminished breath sounds LLL and rhonchi LLL  Heart: RRR w SHELLEY LLSB  Abdomen: soft, non-tender; bowel sounds normal; no masses,  no organomegaly  Extremities: extremities normal, atraumatic, no cyanosis or edema  Neurologic: Mental status: Alert, oriented, thought content appropriate    Assessment and Plan:   1. atr fib/flutter- currently NSR. Dr Shira Machado for MON--- if also ok w nephrology- to eval for possible critical AS. Pt states hesitant about this, asked her to discuss further w cardio  2. LLL pneumonia, R lung mass. CT abd noted pneumonia but CT chest suggested R lung mass-- ?discrepancy? Regardless, Dr Brielle Mcgurie is monitoring and also her pneumonia is w minimal sx, cont abx. 3. HORTENCIA- renal fxn appears stable w Dr Daysi Camara monitoring, would need renal clearance and close monitoring if to proceed w IV dye contrast for [facility] mon.   4. DM- ssi and accuchecks, monitor      Patient Active Problem List:     Perforated duodenal ulcer ([address]     Perforated chronic duodenal ulcer ([address]     Bile peritonitis ([address]     Atrial flutter ([address]     Moderate malnutrition ([address]      Sherri Cedeño MD

## 2019-09-15 LAB
ALBUMIN SERPL-MCNC: 2.6 GM/DL (ref 3.4–5)
ALP BLD-CCNC: 99 IU/L (ref 40–128)
ALT SERPL-CCNC: 155 U/L (ref 10–40)
ANION GAP SERPL CALCULATED.3IONS-SCNC: 10 MMOL/L (ref 4–16)
AST SERPL-CCNC: 112 IU/L (ref 15–37)
BASOPHILS ABSOLUTE: 0 K/CU MM
BASOPHILS RELATIVE PERCENT: 0.2 % (ref 0–1)
BILIRUB SERPL-MCNC: 0.6 MG/DL (ref 0–1)
BUN BLDV-MCNC: 17 MG/DL (ref 6–23)
CALCIUM SERPL-MCNC: 8.2 MG/DL (ref 8.3–10.6)
CHLORIDE BLD-SCNC: 103 MMOL/L (ref 99–110)
CO2: 24 MMOL/L (ref 21–32)
CREAT SERPL-MCNC: 1 MG/DL (ref 0.6–1.1)
DIFFERENTIAL TYPE: ABNORMAL
EOSINOPHILS ABSOLUTE: 0.1 K/CU MM
EOSINOPHILS RELATIVE PERCENT: 0.7 % (ref 0–3)
GFR AFRICAN AMERICAN: >60 ML/MIN/1.73M2
GFR NON-AFRICAN AMERICAN: 53 ML/MIN/1.73M2
GLUCOSE BLD-MCNC: 115 MG/DL (ref 70–99)
GLUCOSE BLD-MCNC: 118 MG/DL (ref 70–99)
GLUCOSE BLD-MCNC: 118 MG/DL (ref 70–99)
GLUCOSE BLD-MCNC: 146 MG/DL (ref 70–99)
GLUCOSE BLD-MCNC: 150 MG/DL (ref 70–99)
HCT VFR BLD CALC: 33.6 % (ref 37–47)
HEMOGLOBIN: 10.3 GM/DL (ref 12.5–16)
IMMATURE NEUTROPHIL %: 0.7 % (ref 0–0.43)
LYMPHOCYTES ABSOLUTE: 0.8 K/CU MM
LYMPHOCYTES RELATIVE PERCENT: 9.6 % (ref 24–44)
MAGNESIUM: 1.7 MG/DL (ref 1.8–2.4)
MCH RBC QN AUTO: 24.7 PG (ref 27–31)
MCHC RBC AUTO-ENTMCNC: 30.7 % (ref 32–36)
MCV RBC AUTO: 80.6 FL (ref 78–100)
MONOCYTES ABSOLUTE: 0.5 K/CU MM
MONOCYTES RELATIVE PERCENT: 5.5 % (ref 0–4)
NUCLEATED RBC %: 0 %
PDW BLD-RTO: 18.1 % (ref 11.7–14.9)
PLATELET # BLD: 233 K/CU MM (ref 140–440)
PMV BLD AUTO: 11.8 FL (ref 7.5–11.1)
POTASSIUM SERPL-SCNC: 3.1 MMOL/L (ref 3.5–5.1)
RBC # BLD: 4.17 M/CU MM (ref 4.2–5.4)
SEGMENTED NEUTROPHILS ABSOLUTE COUNT: 7.2 K/CU MM
SEGMENTED NEUTROPHILS RELATIVE PERCENT: 83.3 % (ref 36–66)
SODIUM BLD-SCNC: 137 MMOL/L (ref 135–145)
TOTAL IMMATURE NEUTOROPHIL: 0.06 K/CU MM
TOTAL NUCLEATED RBC: 0 K/CU MM
TOTAL PROTEIN: 5.8 GM/DL (ref 6.4–8.2)
WBC # BLD: 8.6 K/CU MM (ref 4–10.5)

## 2019-09-15 PROCEDURE — 6360000002 HC RX W HCPCS: Performed by: INTERNAL MEDICINE

## 2019-09-15 PROCEDURE — 6370000000 HC RX 637 (ALT 250 FOR IP): Performed by: GENERAL PRACTICE

## 2019-09-15 PROCEDURE — 2580000003 HC RX 258: Performed by: INTERNAL MEDICINE

## 2019-09-15 PROCEDURE — 80053 COMPREHEN METABOLIC PANEL: CPT

## 2019-09-15 PROCEDURE — 6370000000 HC RX 637 (ALT 250 FOR IP): Performed by: INTERNAL MEDICINE

## 2019-09-15 PROCEDURE — 99232 SBSQ HOSP IP/OBS MODERATE 35: CPT | Performed by: INTERNAL MEDICINE

## 2019-09-15 PROCEDURE — 2140000000 HC CCU INTERMEDIATE R&B

## 2019-09-15 PROCEDURE — 82962 GLUCOSE BLOOD TEST: CPT

## 2019-09-15 PROCEDURE — 83735 ASSAY OF MAGNESIUM: CPT

## 2019-09-15 PROCEDURE — 94761 N-INVAS EAR/PLS OXIMETRY MLT: CPT

## 2019-09-15 PROCEDURE — 85025 COMPLETE CBC W/AUTO DIFF WBC: CPT

## 2019-09-15 PROCEDURE — 2580000003 HC RX 258: Performed by: GENERAL PRACTICE

## 2019-09-15 RX ORDER — MAGNESIUM SULFATE 4 G/50ML
4 INJECTION INTRAVENOUS ONCE
Status: COMPLETED | OUTPATIENT
Start: 2019-09-15 | End: 2019-09-15

## 2019-09-15 RX ORDER — POTASSIUM CHLORIDE 20MEQ/15ML
40 LIQUID (ML) ORAL 3 TIMES DAILY
Status: COMPLETED | OUTPATIENT
Start: 2019-09-15 | End: 2019-09-15

## 2019-09-15 RX ORDER — POTASSIUM CHLORIDE 20 MEQ/1
20 TABLET, EXTENDED RELEASE ORAL DAILY
Status: DISCONTINUED | OUTPATIENT
Start: 2019-09-15 | End: 2019-09-15

## 2019-09-15 RX ADMIN — LATANOPROST 1 DROP: 50 SOLUTION/ DROPS OPHTHALMIC at 20:36

## 2019-09-15 RX ADMIN — DOXYCYCLINE HYCLATE 100 MG: 100 TABLET, COATED ORAL at 11:00

## 2019-09-15 RX ADMIN — METOPROLOL TARTRATE 25 MG: 25 TABLET ORAL at 20:36

## 2019-09-15 RX ADMIN — METOPROLOL TARTRATE 25 MG: 25 TABLET ORAL at 11:00

## 2019-09-15 RX ADMIN — INSULIN LISPRO 2 UNITS: 100 INJECTION, SOLUTION INTRAVENOUS; SUBCUTANEOUS at 11:46

## 2019-09-15 RX ADMIN — Medication 400 MG: at 11:00

## 2019-09-15 RX ADMIN — POTASSIUM CHLORIDE 40 MEQ: 40 SOLUTION ORAL at 20:36

## 2019-09-15 RX ADMIN — POTASSIUM CHLORIDE 40 MEQ: 40 SOLUTION ORAL at 11:00

## 2019-09-15 RX ADMIN — DOXYCYCLINE HYCLATE 100 MG: 100 TABLET, COATED ORAL at 20:36

## 2019-09-15 RX ADMIN — CEFTRIAXONE 1 G: 1 INJECTION, POWDER, FOR SOLUTION INTRAMUSCULAR; INTRAVENOUS at 11:47

## 2019-09-15 RX ADMIN — PANTOPRAZOLE SODIUM 40 MG: 40 TABLET, DELAYED RELEASE ORAL at 11:00

## 2019-09-15 RX ADMIN — POTASSIUM CHLORIDE 40 MEQ: 40 SOLUTION ORAL at 15:13

## 2019-09-15 RX ADMIN — Medication 10 ML: at 11:01

## 2019-09-15 RX ADMIN — MAGNESIUM SULFATE HEPTAHYDRATE 4 G: 80 INJECTION, SOLUTION INTRAVENOUS at 11:44

## 2019-09-15 RX ADMIN — Medication 10 ML: at 20:36

## 2019-09-15 ASSESSMENT — PAIN SCALES - GENERAL: PAINLEVEL_OUTOF10: 0

## 2019-09-15 NOTE — PROGRESS NOTES
IM Progress Note  9/15/2019 9:25 AM  Subjective:   Admit Date: 9/12/2019  PCP: Bhargavi Aceves MD  Chief complaint- fatigue, fast HR      Interval History: states sl lt headed w standing, remains tachycardic this am but in NSR/sinus tach. Denies cp or sob. No coughing      Data:   Scheduled Meds:   magnesium sulfate  4 g Intravenous Once    potassium chloride  40 mEq Oral TID    apixaban  5 mg Oral BID    metoprolol tartrate  25 mg Oral BID    cefTRIAXone (ROCEPHIN) IV  1 g Intravenous Q24H    doxycycline hyclate  100 mg Oral 2 times per day    latanoprost  1 drop Both Eyes Nightly    pantoprazole  40 mg Oral Daily    sodium chloride flush  10 mL Intravenous 2 times per day    insulin lispro  0-12 Units Subcutaneous TID WC    insulin lispro  0-6 Units Subcutaneous Nightly     Continuous Infusions:   dextrose       PRN Meds:diphenoxylate-atropine, sodium chloride flush, magnesium hydroxide, ondansetron, glucose, dextrose, glucagon (rDNA), dextrose  I/O last 3 completed shifts: In: 10 [I.V.:10]  Out: -   CBC:   Recent Labs     09/13/19  0452 09/14/19  0452 09/15/19  0600   WBC 8.9 14.4* 8.6   HGB 10.7* 10.5* 10.3*    258 233     BMP:    Recent Labs     09/13/19  1304 09/14/19  0452 09/15/19  0600    135 137   K 3.8 3.8 3.1*    100 103   CO2 20* 22 24   BUN 17 18 17   CREATININE 1.4* 1.3* 1.0   GLUCOSE 143* 111* 115*     Hepatic:   Recent Labs     09/13/19  0452 09/14/19  0452 09/15/19  0600   * 243* 112*   * 205* 155*   BILITOT 1.3* 0.8 0.6   ALKPHOS 114 106 99     Troponin: No results for input(s): TROPONINI in the last 72 hours. BNP: No results for input(s): BNP in the last 72 hours.   INR:   Recent Labs     09/12/19  1710   INR 1.81       Objective:   Vitals: /70   Pulse 111   Temp 98.1 °F (36.7 °C) (Oral)   Resp 23   Ht 5' 7\" (1.702 m)   Wt 161 lb 8 oz (73.3 kg)   SpO2 97%   BMI 25.29 kg/m²   General appearance: alert and cooperative with exam  Lungs: clear to auscultation bilaterally, diminished breath sounds LLL and rhonchi LLL  Heart: RRR w SHELLEY LLSB  Abdomen: soft, non-tender; bowel sounds normal; no masses,  no organomegaly  Extremities: extremities normal, atraumatic, no cyanosis or edema  Neurologic: Mental status: Alert, oriented, thought content appropriate    Assessment and Plan:   1. atr fib/flutter- currently NSR. Dr Hermann Preston for MON--- if also ok w nephrology- to eval for possible critical AS.  - BP SL LOW AND TACHYCARDIC, MAY NEED TO CONSIDER FLUID BOLUS VS PROAMATINE IF HYPOTENSIVE. DEFER TO CARDIO AND NEPHROLOGY  2. LLL pneumonia, R lung mass. CT abd noted pneumonia but CT chest suggested R lung mass-- ?discrepancy? Regardless, Dr Jovon Nichole is monitoring and also her pneumonia is w minimal sx, cont abx.- MINIMAL SX AT THIS TIME  3. HORTENCIA- renal fxn appears stable w Dr Stan Rivas monitoring, would need renal clearance and close monitoring if to proceed w IV dye contrast for [facility] mon. MAGNESIUM AND K SL LOW, DR DOBSON MANAGING. 4. DM- ssi and accuchecks, monitor  5.  DR BALTAZAR BACK IN AM TO RESUME CARE      Patient Active Problem List:     Perforated duodenal ulcer ([address]     Perforated chronic duodenal ulcer ([address]     Bile peritonitis ([address]     Atrial flutter ([address]     Moderate malnutrition ([address]      Benjamin Parry MD

## 2019-09-15 NOTE — PROGRESS NOTES
follow    JANKI---iMPRESSION:  1.  There is spontaneous echodensity noted in the left atrium, left  atrial appendage, LV cavity. 2.  No clot or thrombus noted in the left atrium, left atrial appendage,  or LV cavity. 3.  Moderate mitral regurgitation noted in the central jet.  Posterior  mitral valve leaflet is restricted and calcified.  Anterior valve  leaflet appears normal.  4.  EF is around 50% range present. 5.  Aortic valve is sclerotic and stenotic, appears almost severe aortic  stenosis noted with mild aortic regurgitation present. 6.  Interatrial septum is bowing to the right side suggestive of  increased pressure in the left side. 7.  Tricuspid and pulmonic valves are grossly normal.     CT abdomen -       Impression:         1. Tree-in-bud opacities and ground-glass densities in the left lower lobe  consistent with pneumonia or aspiration pneumonitis.  Trace left pleural  effusion. 2. No acute abdominal or pelvic abnormality. 3. Colonic diverticulosis without acute inflammatory changes. 4. Postsurgical changes in the stomach as well as duodenum.  Status post  cholecystectomy and hysterectomy. 5. Stable mildly enlarged retroperitoneal periaortic lymph nodes as before.         CT chest -       Impression:         1. No evidence of pulmonary embolus.  Mild enlargement of the pulmonary  artery which can be seen with pulmonary hypertension. 2. Masslike consolidation in the right lower lobe, 1.8 x 1.3 cm.  Recommend  follow-up CT in 3 months to ensure resolution. 3. Trace pericardial fluid. 4. Mild cardiomegaly. 5. Coronary artery atherosclerosis.          Objective:   /70   Pulse 111   Temp 98.1 °F (36.7 °C) (Oral)   Resp 23   Ht 5' 7\" (1.702 m)   Wt 161 lb 8 oz (73.3 kg)   SpO2 97%   BMI 25.29 kg/m²       Intake/Output Summary (Last 24 hours) at 9/15/2019 0913  Last data filed at 9/14/2019 2208  Gross per 24 hour   Intake 10 ml   Output --   Net 10 ml       Medications:   Scheduled

## 2019-09-15 NOTE — PROGRESS NOTES
Nephrology Progress Note  9/15/2019 3:59 PM        Subjective:   Admit Date: 9/12/2019  PCP: Keyanna Smith MD       This is a late entry. Pt seen in early am     Interval History: doing well    Diet: excellent    ROS:  No sob    Data:     Current med's:    potassium chloride  40 mEq Oral TID    magnesium oxide  400 mg Oral Daily    metoprolol tartrate  25 mg Oral TID    cefTRIAXone (ROCEPHIN) IV  1 g Intravenous Q24H    doxycycline hyclate  100 mg Oral 2 times per day    latanoprost  1 drop Both Eyes Nightly    pantoprazole  40 mg Oral Daily    sodium chloride flush  10 mL Intravenous 2 times per day    insulin lispro  0-12 Units Subcutaneous TID WC    insulin lispro  0-6 Units Subcutaneous Nightly      dextrose           I/O last 3 completed shifts: In: 10 [I.V.:10]  Out: -     CBC:   Recent Labs     09/13/19  0452 09/14/19  0452 09/15/19  0600   WBC 8.9 14.4* 8.6   HGB 10.7* 10.5* 10.3*    258 233          Recent Labs     09/13/19  1304 09/14/19  0452 09/15/19  0600    135 137   K 3.8 3.8 3.1*    100 103   CO2 20* 22 24   BUN 17 18 17   CREATININE 1.4* 1.3* 1.0   GLUCOSE 143* 111* 115*       Lab Results   Component Value Date    CALCIUM 8.2 (L) 09/15/2019    PHOS 3.7 09/14/2019       Objective:     Vitals: BP 97/69   Pulse 87   Temp 94.6 °F (34.8 °C) (Oral)   Resp 17   Ht 5' 7\" (1.702 m)   Wt 161 lb 8 oz (73.3 kg)   SpO2 100%   BMI 25.29 kg/m²     General appearance:  No distress. thin  HEENT:  + pallor  Neck:  supple  Lungs:  CTA  Heart:  Seems RRr  Abdomen: soft  Extremities:  No edema       Problem List :         Impression :     1. HORTENCIA/ CKD stage  2- recovering  2. Low K-/ mg  likely poor total body store  3. DM/ arrhythmia - BP rather low    Recommendation/Plan  :     1. replete K/ mg  2. Good diet  3. High K diet  4. revisit abx   5.  Follow clinically       Bryson Ramirez MD

## 2019-09-15 NOTE — PROGRESS NOTES
Pulmonary and Critical Care  Progress Note    Subjective: The patient is better. Shortness of breath none. Chest pain none  Addressing respiratory complaints Patient is negative for  hemoptysis and cyanosis  CONSTITUTIONAL:  negative for fevers and chills      Past Medical History:     has a past medical history of Diabetes mellitus ([address] Hyperlipidemia, and Hypertension. has a past surgical history that includes Hysterectomy; Tonsillectomy and adenoidectomy; and Abdominal exploration surgery ([id number]). reports that she has quit smoking. She has never used smokeless tobacco. She reports that she drinks alcohol. She reports that she does not use drugs. Family history:  family history is not on file. Allergies   Allergen Reactions    Zinc     Pcn [Penicillins] Rash     Social History:    Reviewed; no changes    Objective:   PHYSICAL EXAM:        VITALS:  /79   Pulse 104   Temp 97.6 °F (36.4 °C) (Oral)   Resp 18   Ht 5' 7\" (1.702 m)   Wt 161 lb 8 oz (73.3 kg)   SpO2 100%   BMI 25.29 kg/m²     24HR INTAKE/OUTPUT:      Intake/Output Summary (Last 24 hours) at 9/15/2019 1223  Last data filed at 9/14/2019 2207  Gross per 24 hour   Intake 10 ml   Output --   Net 10 ml       CONSTITUTIONAL:  awake, alert, cooperative, no apparent distress, and appears stated age  LUNGS:  decreased breath sounds. CARDIOVASCULAR:  normal S1 and S2 and negative JVD  ABD:Abdomen soft, non-tender. BS normal. No masses,  No organomegaly  NEURO:Alert and oriented x3. Gait normal. Reflexes and motor strength normal and symmetric. Cranial nerves 2-12 and sensation grossly intact.   DATA:    CBC:  Recent Labs     09/13/19  0452 09/14/19  0452 09/15/19  0600   WBC 8.9 14.4* 8.6   RBC 4.25 4.27 4.17*   HGB 10.7* 10.5* 10.3*   HCT 34.4* 34.3* 33.6*    258 233   MCV 80.9 80.3 80.6   MCH 25.2* 24.6* 24.7*   MCHC 31.1* 30.6* 30.7*   RDW 18.1* 18.0* 18.1*   SEGSPCT 81.7* 87.7* 83.3*      BMP:  Recent Labs 09/13/19  1304 09/14/19  0452 09/15/19  0600    135 137   K 3.8 3.8 3.1*    100 103   CO2 20* 22 24   BUN 17 18 17   CREATININE 1.4* 1.3* 1.0   CALCIUM 7.7* 7.7* 8.2*   GLUCOSE 143* 111* 115*      ABG:  No results for input(s): PH, PO2ART, BSF9PRU, HCO3, BEART, O2SAT in the last 72 hours. Lab Results   Component Value Date    PROBNP 2,749 (H) 09/12/2019     No results found for: [facility]    Radiology Review:  Pertinent images / reports were reviewed as a part of this visit. Assessment:     Patient Active Problem List   Diagnosis    Perforated duodenal ulcer ([address]    Perforated chronic duodenal ulcer ([address]    Bile peritonitis ([address]    Atrial flutter ([facility])    Moderate malnutrition ([address]    Pneumonia of left lower lobe due to infectious organism [facility])    Aortic valve stenosis    Type 2 diabetes mellitus without complication, without long-term current use of insulin ([address]       Plan:   1. TTNA RLL mass tomorrow. 2. Discussed with the pt. and family. 3. Supp. Mg and kcl.     Calin Prieto MD  9/15/2019  12:23 PM

## 2019-09-16 LAB
ALBUMIN SERPL-MCNC: 2.5 GM/DL (ref 3.4–5)
ALP BLD-CCNC: 100 IU/L (ref 40–128)
ALT SERPL-CCNC: 125 U/L (ref 10–40)
ANION GAP SERPL CALCULATED.3IONS-SCNC: 9 MMOL/L (ref 4–16)
AST SERPL-CCNC: 71 IU/L (ref 15–37)
BASE EXCESS: ABNORMAL (ref 0–2.4)
BILIRUB SERPL-MCNC: 0.6 MG/DL (ref 0–1)
BUN BLDV-MCNC: 13 MG/DL (ref 6–23)
CALCIUM SERPL-MCNC: 8.6 MG/DL (ref 8.3–10.6)
CARBON MONOXIDE, BLOOD: 1.9 % (ref 0–5)
CHLORIDE BLD-SCNC: 106 MMOL/L (ref 99–110)
CO2 CONTENT: 24 MMOL/L (ref 19–24)
CO2: 25 MMOL/L (ref 21–32)
COMMENT: ABNORMAL
CREAT SERPL-MCNC: 1 MG/DL (ref 0.6–1.1)
GFR AFRICAN AMERICAN: >60 ML/MIN/1.73M2
GFR NON-AFRICAN AMERICAN: 53 ML/MIN/1.73M2
GLUCOSE BLD-MCNC: 105 MG/DL (ref 70–99)
GLUCOSE BLD-MCNC: 105 MG/DL (ref 70–99)
GLUCOSE BLD-MCNC: 106 MG/DL (ref 70–99)
GLUCOSE BLD-MCNC: 121 MG/DL (ref 70–99)
GLUCOSE BLD-MCNC: 132 MG/DL (ref 70–99)
HCO3 ARTERIAL: 22.9 MMOL/L (ref 18–23)
MAGNESIUM: 2 MG/DL (ref 1.8–2.4)
METHEMOGLOBIN ARTERIAL: 0.9 %
O2 SATURATION: 94.9 % (ref 96–97)
PCO2 ARTERIAL: 37 MMHG (ref 32–45)
PH BLOOD: 7.4 (ref 7.34–7.45)
PO2 ARTERIAL: 79 MMHG (ref 75–100)
POTASSIUM SERPL-SCNC: 4.7 MMOL/L (ref 3.5–5.1)
SODIUM BLD-SCNC: 140 MMOL/L (ref 135–145)
TOTAL PROTEIN: 5.7 GM/DL (ref 6.4–8.2)

## 2019-09-16 PROCEDURE — 94761 N-INVAS EAR/PLS OXIMETRY MLT: CPT

## 2019-09-16 PROCEDURE — 83735 ASSAY OF MAGNESIUM: CPT

## 2019-09-16 PROCEDURE — 2580000003 HC RX 258: Performed by: INTERNAL MEDICINE

## 2019-09-16 PROCEDURE — C1769 GUIDE WIRE: HCPCS

## 2019-09-16 PROCEDURE — C1751 CATH, INF, PER/CENT/MIDLINE: HCPCS

## 2019-09-16 PROCEDURE — 80053 COMPREHEN METABOLIC PANEL: CPT

## 2019-09-16 PROCEDURE — 2500000003 HC RX 250 WO HCPCS

## 2019-09-16 PROCEDURE — 6370000000 HC RX 637 (ALT 250 FOR IP): Performed by: INTERNAL MEDICINE

## 2019-09-16 PROCEDURE — 6360000002 HC RX W HCPCS: Performed by: INTERNAL MEDICINE

## 2019-09-16 PROCEDURE — 87086 URINE CULTURE/COLONY COUNT: CPT

## 2019-09-16 PROCEDURE — 82962 GLUCOSE BLOOD TEST: CPT

## 2019-09-16 PROCEDURE — C1894 INTRO/SHEATH, NON-LASER: HCPCS

## 2019-09-16 PROCEDURE — B2111ZZ FLUOROSCOPY OF MULTIPLE CORONARY ARTERIES USING LOW OSMOLAR CONTRAST: ICD-10-PCS | Performed by: INTERNAL MEDICINE

## 2019-09-16 PROCEDURE — 93460 R&L HRT ART/VENTRICLE ANGIO: CPT

## 2019-09-16 PROCEDURE — 2709999900 HC NON-CHARGEABLE SUPPLY

## 2019-09-16 PROCEDURE — 6360000004 HC RX CONTRAST MEDICATION

## 2019-09-16 PROCEDURE — 6370000000 HC RX 637 (ALT 250 FOR IP): Performed by: GENERAL PRACTICE

## 2019-09-16 PROCEDURE — 4A023N7 MEASUREMENT OF CARDIAC SAMPLING AND PRESSURE, LEFT HEART, PERCUTANEOUS APPROACH: ICD-10-PCS | Performed by: INTERNAL MEDICINE

## 2019-09-16 PROCEDURE — 82803 BLOOD GASES ANY COMBINATION: CPT

## 2019-09-16 PROCEDURE — 2580000003 HC RX 258: Performed by: GENERAL PRACTICE

## 2019-09-16 PROCEDURE — 6360000002 HC RX W HCPCS

## 2019-09-16 PROCEDURE — 36415 COLL VENOUS BLD VENIPUNCTURE: CPT

## 2019-09-16 PROCEDURE — 2140000000 HC CCU INTERMEDIATE R&B

## 2019-09-16 RX ORDER — SODIUM CHLORIDE 9 MG/ML
INJECTION, SOLUTION INTRAVENOUS CONTINUOUS
Status: DISCONTINUED | OUTPATIENT
Start: 2019-09-16 | End: 2019-09-17 | Stop reason: HOSPADM

## 2019-09-16 RX ORDER — ATROPINE SULFATE 0.4 MG/ML
0.5 AMPUL (ML) INJECTION
Status: ACTIVE | OUTPATIENT
Start: 2019-09-16 | End: 2019-09-16

## 2019-09-16 RX ORDER — SODIUM CHLORIDE 0.9 % (FLUSH) 0.9 %
10 SYRINGE (ML) INJECTION EVERY 12 HOURS SCHEDULED
Status: DISCONTINUED | OUTPATIENT
Start: 2019-09-16 | End: 2019-09-17 | Stop reason: HOSPADM

## 2019-09-16 RX ORDER — MORPHINE SULFATE 4 MG/ML
1 INJECTION, SOLUTION INTRAMUSCULAR; INTRAVENOUS
Status: ACTIVE | OUTPATIENT
Start: 2019-09-16 | End: 2019-09-16

## 2019-09-16 RX ORDER — ACETAMINOPHEN 325 MG/1
650 TABLET ORAL EVERY 4 HOURS PRN
Status: DISCONTINUED | OUTPATIENT
Start: 2019-09-16 | End: 2019-09-17 | Stop reason: HOSPADM

## 2019-09-16 RX ORDER — SODIUM CHLORIDE 9 MG/ML
INJECTION, SOLUTION INTRAVENOUS CONTINUOUS
Status: DISCONTINUED | OUTPATIENT
Start: 2019-09-16 | End: 2019-09-16

## 2019-09-16 RX ORDER — SODIUM CHLORIDE 0.9 % (FLUSH) 0.9 %
10 SYRINGE (ML) INJECTION PRN
Status: DISCONTINUED | OUTPATIENT
Start: 2019-09-16 | End: 2019-09-17 | Stop reason: HOSPADM

## 2019-09-16 RX ADMIN — METOPROLOL TARTRATE 25 MG: 25 TABLET ORAL at 22:03

## 2019-09-16 RX ADMIN — Medication 400 MG: at 12:20

## 2019-09-16 RX ADMIN — CEFTRIAXONE 1 G: 1 INJECTION, POWDER, FOR SOLUTION INTRAMUSCULAR; INTRAVENOUS at 12:18

## 2019-09-16 RX ADMIN — LATANOPROST 1 DROP: 50 SOLUTION/ DROPS OPHTHALMIC at 22:04

## 2019-09-16 RX ADMIN — METOPROLOL TARTRATE 25 MG: 25 TABLET ORAL at 12:19

## 2019-09-16 RX ADMIN — Medication 10 ML: at 09:15

## 2019-09-16 RX ADMIN — DOXYCYCLINE HYCLATE 100 MG: 100 TABLET, COATED ORAL at 12:22

## 2019-09-16 RX ADMIN — DOXYCYCLINE HYCLATE 100 MG: 100 TABLET, COATED ORAL at 22:03

## 2019-09-16 RX ADMIN — SODIUM CHLORIDE: 9 INJECTION, SOLUTION INTRAVENOUS at 16:09

## 2019-09-16 RX ADMIN — PANTOPRAZOLE SODIUM 40 MG: 40 TABLET, DELAYED RELEASE ORAL at 12:21

## 2019-09-16 RX ADMIN — SODIUM CHLORIDE: 9 INJECTION, SOLUTION INTRAVENOUS at 12:18

## 2019-09-16 ASSESSMENT — PAIN SCALES - GENERAL
PAINLEVEL_OUTOF10: 0

## 2019-09-16 NOTE — PROCEDURES
reaches and  wraps the apex. CRYSTAL-3 flow is present. LV pressure was 151 with a LVEDP of 14 mmHg present. Aortic pressure  was 141/76 with a mean of 106 present. IMPRESSION:  1. Normal right heart pressure present. Mean PA pressure was 23. Pulmonary capillary wedge pressure mean was around 20%. 2.  Right coronary artery has mild disease noted. 3.  Left main is patent. 4.  Circ has mild disease. 5.  LAD has mid 40% to 50% stenosis present. 6.  Gradient across the aortic valve is 20 mmHg present and suggests  moderate aortic stenosis present. 7.  Moderate aortic stenosis present. 8.  Moderate LAD disease present. Continue medical treatment.     Minimal blood loss     Efra Wright MD    D: 09/16/2019 14:55:19       T: 09/16/2019 15:27:48     [id number]  Job#: [id number]     Doc#: [id number]    CC:

## 2019-09-16 NOTE — PROGRESS NOTES
Taylor Deshaun is spontaneous echodensity noted in the left atrium, left  atrial appendage, LV cavity. 2.  No clot or thrombus noted in the left atrium, left atrial appendage,  or LV cavity. 3.  Moderate mitral regurgitation noted in the central jet.  Posterior  mitral valve leaflet is restricted and calcified.  Anterior valve  leaflet appears normal.  4.  EF is around 50% range present. 5.  Aortic valve is sclerotic and stenotic, appears almost severe aortic  stenosis noted with mild aortic regurgitation present. 6.  Interatrial septum is bowing to the right side suggestive of  increased pressure in the left side. 7.  Tricuspid and pulmonic valves are grossly normal.     CT abdomen -          Impression:         1. Tree-in-bud opacities and ground-glass densities in the left lower lobe  consistent with pneumonia or aspiration pneumonitis.  Trace left pleural  effusion. 2. No acute abdominal or pelvic abnormality. 3. Colonic diverticulosis without acute inflammatory changes. 4. Postsurgical changes in the stomach as well as duodenum.  Status post  cholecystectomy and hysterectomy. 5. Stable mildly enlarged retroperitoneal periaortic lymph nodes as before.         CT chest -          Impression:         1. No evidence of pulmonary embolus.  Mild enlargement of the pulmonary  artery which can be seen with pulmonary hypertension. 2. Masslike consolidation in the right lower lobe, 1.8 x 1.3 cm.  Recommend  follow-up CT in 3 months to ensure resolution. 3. Trace pericardial fluid. 4. Mild cardiomegaly. 5. Coronary artery atherosclerosis.          Objective:   /83   Pulse 99   Temp 97.6 °F (36.4 °C)   Resp 16   Ht 5' 7\" (1.702 m)   Wt 172 lb 8 oz (78.2 kg)   SpO2 99%   BMI 27.02 kg/m²       Intake/Output Summary (Last 24 hours) at 9/16/2019 0901  Last data filed at 9/16/2019 0129  Gross per 24 hour   Intake --   Output 200 ml   Net -200 ml       Medications:   Scheduled Meds:   magnesium oxide  400 mg Oral Daily    metoprolol tartrate  25 mg Oral TID    cefTRIAXone (ROCEPHIN) IV  1 g Intravenous Q24H    doxycycline hyclate  100 mg Oral 2 times per day    latanoprost  1 drop Both Eyes Nightly    pantoprazole  40 mg Oral Daily    sodium chloride flush  10 mL Intravenous 2 times per day    insulin lispro  0-12 Units Subcutaneous TID WC    insulin lispro  0-6 Units Subcutaneous Nightly      Infusions:   dextrose        PRN Meds:  diphenoxylate-atropine, sodium chloride flush, magnesium hydroxide, ondansetron, glucose, dextrose, glucagon (rDNA), dextrose       Physical Exam:  Vitals:    09/16/19 0414   BP: 107/83   Pulse: 99   Resp: 16   Temp: 97.6 °F (36.4 °C)   SpO2: 99%        General: AAO, NAD  Chest: Nontender  Cardiac: First and Second Heart Sounds are Normal, No Murmurs or Gallops noted  Lungs:Clear to auscultation and percussion. Abdomen: Soft, NT, ND, +BS  Extremities: No clubbing, no edema  Vascular:  Equal 2+ peripheral pulses. Lab Data:  CBC:   Recent Labs     09/14/19  0452 09/15/19  0600   WBC 14.4* 8.6   HGB 10.5* 10.3*   HCT 34.3* 33.6*   MCV 80.3 80.6    233     BMP:   Recent Labs     09/14/19  0452 09/15/19  0600 09/16/19  0438    137 140   K 3.8 3.1* 4.7    103 106   CO2 22 24 25   PHOS 3.7  --   --    BUN 18 17 13   CREATININE 1.3* 1.0 1.0     LIVER PROFILE:   Recent Labs     09/14/19  0452 09/15/19  0600 09/16/19  0438   * 112* 71*   * 155* 125*   BILITOT 0.8 0.6 0.6   ALKPHOS 106 99 100     PT/INR: No results for input(s): PROTIME, INR in the last 72 hours. APTT: No results for input(s): APTT in the last 72 hours. BNP:  No results for input(s): BNP in the last 72 hours.       Assessment:  Patient Active Problem List    Diagnosis Date Noted    Pneumonia of left lower lobe due to infectious organism [facility])     Aortic valve stenosis     Type 2 diabetes mellitus without complication, without long-term current use of insulin (HCC)     Moderate malnutrition ([facility] 09/13/2019    Atrial flutter ([facility] [address] 09/12/2019    Perforated chronic duodenal ulcer ([facility] [address] 09/14/2015    Bile peritonitis ([facility] [address] 09/14/2015    Perforated duodenal ulcer ([facility] [address]        Electronically signed by Hermila Polo PA-C on 9/16/2019 at 9:01 AM

## 2019-09-16 NOTE — PROGRESS NOTES
Procedure site ______RFA and RFV tegaderm_______    Time patient arrived to room: 1520    Hematoma noted? No    Upon arrival to patient's room, procedure site assessed by:     ____________H. Lyssa Cedeno RN____________  And ___________Uma RN on 3 [address]____________      Report given to [address] [place] [address] on 2 [facility] at bedside, Room 3120. Call light within reach. Bed in the lowest position. Family at bedside.

## 2019-09-16 NOTE — PROGRESS NOTES
09/15/19  0600 09/16/19  0438    137 140   K 3.8 3.1* 4.7    103 106   CO2 22 24 25   BUN 18 17 13   CREATININE 1.3* 1.0 1.0   CALCIUM 7.7* 8.2* 8.6   GLUCOSE 111* 115* 106*      ABG:  No results for input(s): PH, PO2ART, NEF2KGQ, HCO3, BEART, O2SAT in the last 72 hours. Lab Results   Component Value Date    PROBNP 2,749 (H) 09/12/2019     No results found for: [facility]    Radiology Review:  Pertinent images / reports were reviewed as a part of this visit. Assessment:     Patient Active Problem List   Diagnosis    Perforated duodenal ulcer ([address]    Perforated chronic duodenal ulcer ([address]    Bile peritonitis ([address]    Atrial flutter (HCC)    Moderate malnutrition ([address]    Pneumonia of left lower lobe due to infectious organism [facility])    Aortic valve stenosis    Type 2 diabetes mellitus without complication, without long-term current use of insulin ([address]       Plan:   1. TTNA RLL mass as outpt. 2. Discussed with the pt.     Rodney Fishman MD  9/16/2019  10:56 AM

## 2019-09-16 NOTE — OP NOTE
[facility] --[id number]  LEFT MAIN PATENT  LAD MID 50% STENOSIS  LCX MILD DX  RCA MILD DX  RA- MEAN 12  RV-MEAN 11  PA-MEAN 23  PCWP=MEAN 20  /75  LV-155/8/15  MEAN PG ACROSS AV IS 20    MODERATE AS  MODERATE LAD DX  MEDICAL TREATMENT  AO SAT 98%  PA SAT 46%  CO-2.44 L/MIN  CI 1.3 L/MIN/M2

## 2019-09-16 NOTE — PROGRESS NOTES
INTERNAL MEDICINE PROGRESS NOTE        Carrillo Holloway   1935   Primary Care Physician:  Donovan Nolen MD  Admit Date: 9/12/2019     Subjective:   Patient is NPO this am, she is going to get biopsy. She does not have chest pain or shortness of breath. No cough or wheezing. Her diarrhea improved at this time. She does not have palpitations.     Objective:   /83   Pulse 99   Temp 97.6 °F (36.4 °C)   Resp 16   Ht 5' 7\" (1.702 m)   Wt 172 lb 8 oz (78.2 kg)   SpO2 99%   BMI 27.02 kg/m²    General appearance: alert, appears stated age and cooperative  Head: Normocephalic, without obvious abnormality, atraumatic  Neck: no adenopathy and supple, symmetrical, trachea midline  Lungs: clear to auscultation bilaterally  Heart: irregular rate and rhythm and S1, S2 normal  Abdomen: soft, non-tender; bowel sounds normal; no masses,  no organomegaly  Extremities: no clubbing, cyanosis or edema  Neurologic: Grossly normal    Data Review  Lab Results   Component Value Date     09/16/2019    K 4.7 09/16/2019     09/16/2019    CO2 25 09/16/2019    CREATININE 1.0 09/16/2019    BUN 13 09/16/2019    CALCIUM 8.6 09/16/2019     Lab Results   Component Value Date    WBC 8.6 09/15/2019    HGB 10.3 (L) 09/15/2019    HCT 33.6 (L) 09/15/2019    MCV 80.6 09/15/2019     09/15/2019     INR/Prothrombin Time      Meds:    magnesium oxide  400 mg Oral Daily    metoprolol tartrate  25 mg Oral TID    cefTRIAXone (ROCEPHIN) IV  1 g Intravenous Q24H    doxycycline hyclate  100 mg Oral 2 times per day    latanoprost  1 drop Both Eyes Nightly    pantoprazole  40 mg Oral Daily    sodium chloride flush  10 mL Intravenous 2 times per day    insulin lispro  0-12 Units Subcutaneous TID WC    insulin lispro  0-6 Units Subcutaneous Nightly     PRN Meds: diphenoxylate-atropine, sodium chloride flush, magnesium hydroxide, ondansetron, glucose, dextrose, glucagon (rDNA), dextrose    Assessment/Plan:   Patient

## 2019-09-17 VITALS
SYSTOLIC BLOOD PRESSURE: 112 MMHG | OXYGEN SATURATION: 98 % | DIASTOLIC BLOOD PRESSURE: 77 MMHG | RESPIRATION RATE: 24 BRPM | BODY MASS INDEX: 27.28 KG/M2 | WEIGHT: 173.8 LBS | HEIGHT: 67 IN | TEMPERATURE: 97.6 F | HEART RATE: 92 BPM

## 2019-09-17 LAB
CULTURE: NORMAL
GLUCOSE BLD-MCNC: 104 MG/DL (ref 70–99)
GLUCOSE BLD-MCNC: 139 MG/DL (ref 70–99)
Lab: NORMAL
MAGNESIUM: 1.8 MG/DL (ref 1.8–2.4)
SPECIMEN: NORMAL

## 2019-09-17 PROCEDURE — 82962 GLUCOSE BLOOD TEST: CPT

## 2019-09-17 PROCEDURE — 36415 COLL VENOUS BLD VENIPUNCTURE: CPT

## 2019-09-17 PROCEDURE — 6370000000 HC RX 637 (ALT 250 FOR IP): Performed by: INTERNAL MEDICINE

## 2019-09-17 PROCEDURE — 2580000003 HC RX 258: Performed by: INTERNAL MEDICINE

## 2019-09-17 PROCEDURE — 83735 ASSAY OF MAGNESIUM: CPT

## 2019-09-17 RX ORDER — CEFDINIR 300 MG/1
300 CAPSULE ORAL 2 TIMES DAILY
Qty: 14 CAPSULE | Refills: 0 | Status: SHIPPED | OUTPATIENT
Start: 2019-09-17 | End: 2019-09-24

## 2019-09-17 RX ORDER — DOXYCYCLINE HYCLATE 100 MG
100 TABLET ORAL EVERY 12 HOURS SCHEDULED
Qty: 20 TABLET | Refills: 0 | Status: SHIPPED | OUTPATIENT
Start: 2019-09-17 | End: 2019-09-27

## 2019-09-17 RX ORDER — METOPROLOL TARTRATE 50 MG/1
50 TABLET, FILM COATED ORAL 2 TIMES DAILY
Qty: 60 TABLET | Refills: 2 | Status: ON HOLD | OUTPATIENT
Start: 2019-09-17 | End: 2019-11-18 | Stop reason: HOSPADM

## 2019-09-17 RX ADMIN — Medication 10 ML: at 09:12

## 2019-09-17 RX ADMIN — METOPROLOL TARTRATE 25 MG: 25 TABLET ORAL at 09:12

## 2019-09-17 RX ADMIN — Medication 400 MG: at 09:12

## 2019-09-17 RX ADMIN — DOXYCYCLINE HYCLATE 100 MG: 100 TABLET, COATED ORAL at 09:12

## 2019-09-17 RX ADMIN — PANTOPRAZOLE SODIUM 40 MG: 40 TABLET, DELAYED RELEASE ORAL at 09:12

## 2019-09-17 ASSESSMENT — PAIN SCALES - GENERAL
PAINLEVEL_OUTOF10: 0
PAINLEVEL_OUTOF10: 0

## 2019-09-17 NOTE — PROGRESS NOTES
Daily Progress Note       I have seen ,spoken to  and examined this patient personally, independently of the Physician assistant . I have reviewed the hospital care given to date and reviewed all pertinent labs and imaging. The plan was developed mutually at the time of the visit with the patient,  PA  and myself. I have spoken with patient, nursing staff and provided written and verbal instructions . The above note has been reviewed and I agree with the assessment, diagnosis, and treatment plan with changes made by me as follows     CARDIOLOGY ATTENDING ADDENDUM    HPI:  I have reviewed the above HPI  And agree with above   Niall Ann is a 81 y. o.year old who and presents with had concerns including Tachycardia and Fatigue. Chief Complaint   Patient presents with    Tachycardia    Fatigue     Interval history:  Awake alert  Going home today  afib rate stable plan to start on  [facility] [place], operated by [facility] as outpatient  Plan for lung bx as outpatient  Moderate AS noted on cath and mild CAd  Going home today      Physical Exam:  General:  Awake alert   Head:normal  Eye:normal  Neck:  No JVD   Chest:  Clear to auscultation, respiration easy  Cardiovascular:  afib  Abdomen:   nontender  Extremities:  No edema    Pulses; palpable  Neuro: grossly normal      MEDICAL DECISION MAKING;    I agree with the above plan, which was planned by myself and discussed with PA. Oz Deleon Electronically signed by Chris Castillo MD [facility] on 9/17/2019 at 11:44 AM     Pt. Awake, alert and feeling well  HR stable, AF in 90s, BP stable  Denies CP, SOB, no dizziness     Afib/flutter    PAF- rate 90s today    On BB     Will start AC outpatient-having Bx next week     Severe AS    TTE and JANKI both suggest    [facility] shows mod AS and Mod CAD     Med.  Tx.      Lung Mass    Seen on imaging    Pulm following-lung biopsy outpatient     PNA    On ABx    Per pulm     Stable from cardiac standpoint-ok to D/C today  F/u at office in 2 weeks    LEFT MAIN PATENT  LAD MID 50% STENOSIS  LCX Problem List    Diagnosis Date Noted    Pneumonia of left lower lobe due to infectious organism [facility])     Aortic valve stenosis     Type 2 diabetes mellitus without complication, without long-term current use of insulin (HCC)     Moderate malnutrition ([address] 09/13/2019    Atrial flutter ([address] 09/12/2019    Perforated chronic duodenal ulcer ([address] 09/14/2015    Bile peritonitis ([address] 09/14/2015    Perforated duodenal ulcer ([facility] [address]        Electronically signed by Tehe Boyd PA-C on 9/17/2019 at 10:19 AM

## 2019-09-17 NOTE — PROGRESS NOTES
Pulmonary and Critical Care  Progress Note    Subjective: The patient has improved. Shortness of breath none  Chest pain none  Addressing respiratory complaints Patient is negative for  hemoptysis and cyanosis  CONSTITUTIONAL:  negative for fevers and chills      Past Medical History:     has a past medical history of Diabetes mellitus ([address] Hyperlipidemia, and Hypertension. has a past surgical history that includes Hysterectomy; Tonsillectomy and adenoidectomy; and Abdominal exploration surgery ([id number]). reports that she has quit smoking. She has never used smokeless tobacco. She reports that she drinks alcohol. She reports that she does not use drugs. Family history:  family history is not on file. Allergies   Allergen Reactions    Zinc     Pcn [Penicillins] Rash     Social History:    Reviewed; no changes    Objective:   PHYSICAL EXAM:        VITALS:  /77   Pulse 92   Temp 97.6 °F (36.4 °C) (Oral)   Resp 24   Ht 5' 7\" (1.702 m)   Wt 173 lb 12.8 oz (78.8 kg)   SpO2 98%   BMI 27.22 kg/m²     24HR INTAKE/OUTPUT:      Intake/Output Summary (Last 24 hours) at 9/17/2019 1049  Last data filed at 9/17/2019 0900  Gross per 24 hour   Intake 480 ml   Output --   Net 480 ml       CONSTITUTIONAL:  awake, alert, cooperative, no apparent distress, and appears stated age  LUNGS:  decreased breath sounds. CARDIOVASCULAR:  normal S1 and S2 and negative JVD  ABD:Abdomen soft, non-tender. BS normal. No masses,  No organomegaly  NEURO:Alert and oriented x3. Gait normal. Reflexes and motor strength normal and symmetric. Cranial nerves 2-12 and sensation grossly intact.   DATA:    CBC:  Recent Labs     09/15/19  0600   WBC 8.6   RBC 4.17*   HGB 10.3*   HCT 33.6*      MCV 80.6   MCH 24.7*   MCHC 30.7*   RDW 18.1*   SEGSPCT 83.3*      BMP:  Recent Labs     09/15/19  0600 09/16/19  0438    140   K 3.1* 4.7    106   CO2 24 25   BUN 17 13   CREATININE 1.0 1.0   CALCIUM 8.2* 8.6   GLUCOSE 115*

## 2019-09-17 NOTE — PROGRESS NOTES
· Current Body Wt: 173 lb (78.5 kg)  · Admission Body Wt: 155 lb 3.2 oz (70.4 kg)  · Usual Body Wt: (n/a)  · % Weight Change: ABELARDO  · Ideal Body Wt: 135 lb (61.2 kg), % Ideal Body 128%  · BMI Classification: BMI 25.0 - 29.9 Overweight(25.3)    Nutrition Interventions:   Continue current diet, Start ONS  Continued Inpatient Monitoring, Education not appropriate at this time, Coordination of Care    Nutrition Evaluation:   · Evaluation: Progressing toward goals   · Goals: Patient will meet at least  50% of estimated nutrient needs with po diet and ONS during los      · Monitoring: Meal Intake, Supplement Intake, Diet Tolerance, Weight, Pertinent Labs, Diarrhea    Electronically signed by Clarke [place] RD, LD on 9/17/19 at 9:07 AM    Contact Number: [phone]

## 2019-10-04 RX ORDER — SODIUM CHLORIDE 0.9 % (FLUSH) 0.9 %
10 SYRINGE (ML) INJECTION PRN
Status: CANCELLED | OUTPATIENT
Start: 2019-10-04

## 2019-10-07 ENCOUNTER — HOSPITAL ENCOUNTER (OUTPATIENT)
Dept: CT IMAGING | Age: 84
Discharge: HOME | End: 2019-10-07
Payer: MEDICARE

## 2019-10-07 VITALS
HEART RATE: 72 BPM | BODY MASS INDEX: 24.8 KG/M2 | WEIGHT: 158 LBS | TEMPERATURE: 98.4 F | RESPIRATION RATE: 16 BRPM | OXYGEN SATURATION: 98 % | SYSTOLIC BLOOD PRESSURE: 145 MMHG | DIASTOLIC BLOOD PRESSURE: 84 MMHG | HEIGHT: 67 IN

## 2019-10-07 DIAGNOSIS — R91.8 LUNG MASS: ICD-10-CM

## 2019-10-07 LAB
APTT: 36 SECONDS (ref 21.2–33)
BASOPHILS ABSOLUTE: 0 K/CU MM
BASOPHILS RELATIVE PERCENT: 0.3 % (ref 0–1)
DIFFERENTIAL TYPE: ABNORMAL
EOSINOPHILS ABSOLUTE: 0 K/CU MM
EOSINOPHILS RELATIVE PERCENT: 0.5 % (ref 0–3)
HCT VFR BLD CALC: 36.8 % (ref 37–47)
HEMOGLOBIN: 10.8 GM/DL (ref 12.5–16)
IMMATURE NEUTROPHIL %: 0.4 % (ref 0–0.43)
INR BLD: 1.54 INDEX
LYMPHOCYTES ABSOLUTE: 1.1 K/CU MM
LYMPHOCYTES RELATIVE PERCENT: 14.2 % (ref 24–44)
MCH RBC QN AUTO: 25.6 PG (ref 27–31)
MCHC RBC AUTO-ENTMCNC: 29.3 % (ref 32–36)
MCV RBC AUTO: 87.2 FL (ref 78–100)
MONOCYTES ABSOLUTE: 0.6 K/CU MM
MONOCYTES RELATIVE PERCENT: 8.2 % (ref 0–4)
NUCLEATED RBC %: 0 %
PDW BLD-RTO: 23.3 % (ref 11.7–14.9)
PLATELET # BLD: 180 K/CU MM (ref 140–440)
PMV BLD AUTO: 12.3 FL (ref 7.5–11.1)
PROTHROMBIN TIME: 17.9 SECONDS (ref 9.12–12.5)
RBC # BLD: 4.22 M/CU MM (ref 4.2–5.4)
SEGMENTED NEUTROPHILS ABSOLUTE COUNT: 5.8 K/CU MM
SEGMENTED NEUTROPHILS RELATIVE PERCENT: 76.4 % (ref 36–66)
TOTAL IMMATURE NEUTOROPHIL: 0.03 K/CU MM
TOTAL NUCLEATED RBC: 0 K/CU MM
TOTAL RETICULOCYTE COUNT: 0.05 K/CU MM
WBC # BLD: 7.5 K/CU MM (ref 4–10.5)

## 2019-10-07 PROCEDURE — 6360000002 HC RX W HCPCS: Performed by: RADIOLOGY

## 2019-10-07 PROCEDURE — 85025 COMPLETE CBC W/AUTO DIFF WBC: CPT

## 2019-10-07 PROCEDURE — 2709999900 HC NON-CHARGEABLE SUPPLY

## 2019-10-07 PROCEDURE — 85610 PROTHROMBIN TIME: CPT

## 2019-10-07 PROCEDURE — 2580000003 HC RX 258: Performed by: INTERNAL MEDICINE

## 2019-10-07 PROCEDURE — 85730 THROMBOPLASTIN TIME PARTIAL: CPT

## 2019-10-07 PROCEDURE — 7100000010 HC PHASE II RECOVERY - FIRST 15 MIN

## 2019-10-07 PROCEDURE — 76380 CAT SCAN FOLLOW-UP STUDY: CPT

## 2019-10-07 PROCEDURE — 7100000011 HC PHASE II RECOVERY - ADDTL 15 MIN

## 2019-10-07 RX ORDER — SODIUM CHLORIDE 0.9 % (FLUSH) 0.9 %
10 SYRINGE (ML) INJECTION PRN
Status: DISCONTINUED | OUTPATIENT
Start: 2019-10-07 | End: 2019-10-08 | Stop reason: HOSPADM

## 2019-10-07 RX ORDER — MIDAZOLAM HYDROCHLORIDE 1 MG/ML
1 INJECTION INTRAMUSCULAR; INTRAVENOUS ONCE
Status: COMPLETED | OUTPATIENT
Start: 2019-10-07 | End: 2019-10-07

## 2019-10-07 RX ORDER — FENTANYL CITRATE 50 UG/ML
50 INJECTION, SOLUTION INTRAMUSCULAR; INTRAVENOUS ONCE
Status: COMPLETED | OUTPATIENT
Start: 2019-10-07 | End: 2019-10-07

## 2019-10-07 RX ADMIN — FENTANYL CITRATE 50 MCG: 50 INJECTION INTRAMUSCULAR; INTRAVENOUS at 11:34

## 2019-10-07 RX ADMIN — MIDAZOLAM HYDROCHLORIDE 1 MG: 1 INJECTION, SOLUTION INTRAMUSCULAR; INTRAVENOUS at 11:34

## 2019-10-07 RX ADMIN — Medication 10 ML: at 09:38

## 2019-10-07 ASSESSMENT — PAIN - FUNCTIONAL ASSESSMENT
PAIN_FUNCTIONAL_ASSESSMENT: 0-10
PAIN_FUNCTIONAL_ASSESSMENT: 0-10

## 2019-10-07 ASSESSMENT — PAIN SCALES - GENERAL
PAINLEVEL_OUTOF10: 0
PAINLEVEL_OUTOF10: 0
PAINLEVEL_OUTOF10: 1

## 2019-11-06 ENCOUNTER — APPOINTMENT (OUTPATIENT)
Dept: GENERAL RADIOLOGY | Age: 84
DRG: 215 | End: 2019-11-06
Payer: MEDICARE

## 2019-11-06 ENCOUNTER — HOSPITAL ENCOUNTER (INPATIENT)
Age: 84
LOS: 13 days | Discharge: NURSING, REHAB OR LONG-TERM CARE | DRG: 215 | End: 2019-11-19
Attending: EMERGENCY MEDICINE | Admitting: GENERAL PRACTICE
Payer: MEDICARE

## 2019-11-06 DIAGNOSIS — R94.31 ST ELEVATION: Primary | ICD-10-CM

## 2019-11-06 PROBLEM — I21.3 STEMI (ST ELEVATION MYOCARDIAL INFARCTION) (HCC): Status: ACTIVE | Noted: 2019-11-06

## 2019-11-06 PROBLEM — I21.3 ST ELEVATION MI (STEMI) (HCC): Status: ACTIVE | Noted: 2019-11-06

## 2019-11-06 LAB
ACTIVATED CLOTTING TIME, LOW RANGE: 217 SEC
ACTIVATED CLOTTING TIME, LOW RANGE: 368 SEC
ALBUMIN SERPL-MCNC: 3.5 GM/DL (ref 3.4–5)
ALP BLD-CCNC: 117 IU/L (ref 40–128)
ALT SERPL-CCNC: 27 U/L (ref 10–40)
ANION GAP SERPL CALCULATED.3IONS-SCNC: 18 MMOL/L (ref 4–16)
AST SERPL-CCNC: 107 IU/L (ref 15–37)
BACTERIA: NEGATIVE /HPF
BASE EXCESS MIXED: ABNORMAL (ref 0–2.3)
BASOPHILS ABSOLUTE: 0.1 K/CU MM
BASOPHILS RELATIVE PERCENT: 0.8 % (ref 0–1)
BILIRUB SERPL-MCNC: 0.6 MG/DL (ref 0–1)
BILIRUBIN URINE: NEGATIVE MG/DL
BLOOD, URINE: ABNORMAL
BUN BLDV-MCNC: 12 MG/DL (ref 6–23)
CALCIUM SERPL-MCNC: 9.8 MG/DL (ref 8.3–10.6)
CARBON MONOXIDE, BLOOD: 2.1 % (ref 0–5)
CHLORIDE BLD-SCNC: 103 MMOL/L (ref 99–110)
CLARITY: CLEAR
CO2 CONTENT: 24.5 MMOL/L (ref 19–24)
CO2: 22 MMOL/L (ref 21–32)
COLOR: YELLOW
COMMENT: ABNORMAL
CREAT SERPL-MCNC: 1 MG/DL (ref 0.6–1.1)
DIFFERENTIAL TYPE: ABNORMAL
EKG ATRIAL RATE: 268 BPM
EKG ATRIAL RATE: 268 BPM
EKG DIAGNOSIS: NORMAL
EKG DIAGNOSIS: NORMAL
EKG P AXIS: 48 DEGREES
EKG P-R INTERVAL: 160 MS
EKG P-R INTERVAL: 278 MS
EKG Q-T INTERVAL: 410 MS
EKG Q-T INTERVAL: 420 MS
EKG QRS DURATION: 72 MS
EKG QRS DURATION: 76 MS
EKG QTC CALCULATION (BAZETT): 479 MS
EKG QTC CALCULATION (BAZETT): 490 MS
EKG R AXIS: -56 DEGREES
EKG R AXIS: -60 DEGREES
EKG T AXIS: -25 DEGREES
EKG T AXIS: 9 DEGREES
EKG VENTRICULAR RATE: 82 BPM
EKG VENTRICULAR RATE: 82 BPM
EOSINOPHILS ABSOLUTE: 0.1 K/CU MM
EOSINOPHILS RELATIVE PERCENT: 1.8 % (ref 0–3)
GFR AFRICAN AMERICAN: >60 ML/MIN/1.73M2
GFR NON-AFRICAN AMERICAN: 53 ML/MIN/1.73M2
GLUCOSE BLD-MCNC: 207 MG/DL (ref 70–99)
GLUCOSE BLD-MCNC: 216 MG/DL (ref 70–99)
GLUCOSE, URINE: NEGATIVE MG/DL
HCO3 ARTERIAL: 23.5 MMOL/L (ref 18–23)
HCT VFR BLD CALC: 44.3 % (ref 37–47)
HEMOGLOBIN: 12.9 GM/DL (ref 12.5–16)
HYALINE CASTS: 0 /LPF
IMMATURE NEUTROPHIL %: 0.4 % (ref 0–0.43)
KETONES, URINE: NEGATIVE MG/DL
LEUKOCYTE ESTERASE, URINE: NEGATIVE
LYMPHOCYTES ABSOLUTE: 1 K/CU MM
LYMPHOCYTES RELATIVE PERCENT: 13 % (ref 24–44)
MAGNESIUM: 2.1 MG/DL (ref 1.8–2.4)
MCH RBC QN AUTO: 25 PG (ref 27–31)
MCHC RBC AUTO-ENTMCNC: 29.1 % (ref 32–36)
MCV RBC AUTO: 86 FL (ref 78–100)
METHEMOGLOBIN ARTERIAL: 1.3 %
MONOCYTES ABSOLUTE: 0.3 K/CU MM
MONOCYTES RELATIVE PERCENT: 3.7 % (ref 0–4)
MUCUS: ABNORMAL HPF
NITRITE URINE, QUANTITATIVE: NEGATIVE
NUCLEATED RBC %: 0 %
O2 SATURATION: 92.1 % (ref 96–97)
PCO2 ARTERIAL: 33 MMHG (ref 32–45)
PDW BLD-RTO: 20.9 % (ref 11.7–14.9)
PH BLOOD: 7.46 (ref 7.34–7.45)
PH, URINE: 5 (ref 5–8)
PLATELET # BLD: 327 K/CU MM (ref 140–440)
PMV BLD AUTO: 10.9 FL (ref 7.5–11.1)
PO2 ARTERIAL: 63 MMHG (ref 75–100)
POTASSIUM SERPL-SCNC: 4 MMOL/L (ref 3.5–5.1)
PROTEIN UA: NEGATIVE MG/DL
RBC # BLD: 5.15 M/CU MM (ref 4.2–5.4)
RBC URINE: 3 /HPF (ref 0–6)
SEGMENTED NEUTROPHILS ABSOLUTE COUNT: 6.4 K/CU MM
SEGMENTED NEUTROPHILS RELATIVE PERCENT: 80.3 % (ref 36–66)
SODIUM BLD-SCNC: 143 MMOL/L (ref 135–145)
SPECIFIC GRAVITY UA: >1.06 (ref 1–1.03)
SPECIFIC GRAVITY UA: ABNORMAL (ref 1–1.03)
SQUAMOUS EPITHELIAL: <1 /HPF
TOTAL IMMATURE NEUTOROPHIL: 0.03 K/CU MM
TOTAL NUCLEATED RBC: 0 K/CU MM
TOTAL PROTEIN: 7.2 GM/DL (ref 6.4–8.2)
TRICHOMONAS: ABNORMAL /HPF
TROPONIN T: 0.65 NG/ML
UROBILINOGEN, URINE: NORMAL MG/DL (ref 0.2–1)
WBC # BLD: 7.9 K/CU MM (ref 4–10.5)
WBC UA: <1 /HPF (ref 0–5)

## 2019-11-06 PROCEDURE — 85025 COMPLETE CBC W/AUTO DIFF WBC: CPT

## 2019-11-06 PROCEDURE — 71045 X-RAY EXAM CHEST 1 VIEW: CPT

## 2019-11-06 PROCEDURE — 6370000000 HC RX 637 (ALT 250 FOR IP): Performed by: GENERAL PRACTICE

## 2019-11-06 PROCEDURE — 2500000003 HC RX 250 WO HCPCS

## 2019-11-06 PROCEDURE — 93308 TTE F-UP OR LMTD: CPT

## 2019-11-06 PROCEDURE — 2580000003 HC RX 258: Performed by: GENERAL PRACTICE

## 2019-11-06 PROCEDURE — 80053 COMPREHEN METABOLIC PANEL: CPT

## 2019-11-06 PROCEDURE — 027034Z DILATION OF CORONARY ARTERY, ONE ARTERY WITH DRUG-ELUTING INTRALUMINAL DEVICE, PERCUTANEOUS APPROACH: ICD-10-PCS | Performed by: INTERNAL MEDICINE

## 2019-11-06 PROCEDURE — 6360000002 HC RX W HCPCS

## 2019-11-06 PROCEDURE — 83735 ASSAY OF MAGNESIUM: CPT

## 2019-11-06 PROCEDURE — 93005 ELECTROCARDIOGRAM TRACING: CPT | Performed by: EMERGENCY MEDICINE

## 2019-11-06 PROCEDURE — B2161ZZ FLUOROSCOPY OF RIGHT AND LEFT HEART USING LOW OSMOLAR CONTRAST: ICD-10-PCS | Performed by: INTERNAL MEDICINE

## 2019-11-06 PROCEDURE — 82962 GLUCOSE BLOOD TEST: CPT

## 2019-11-06 PROCEDURE — 94660 CPAP INITIATION&MGMT: CPT

## 2019-11-06 PROCEDURE — 6360000004 HC RX CONTRAST MEDICATION

## 2019-11-06 PROCEDURE — 84484 ASSAY OF TROPONIN QUANT: CPT

## 2019-11-06 PROCEDURE — 82803 BLOOD GASES ANY COMBINATION: CPT

## 2019-11-06 PROCEDURE — 81001 URINALYSIS AUTO W/SCOPE: CPT

## 2019-11-06 PROCEDURE — B2111ZZ FLUOROSCOPY OF MULTIPLE CORONARY ARTERIES USING LOW OSMOLAR CONTRAST: ICD-10-PCS | Performed by: INTERNAL MEDICINE

## 2019-11-06 PROCEDURE — 4A023N7 MEASUREMENT OF CARDIAC SAMPLING AND PRESSURE, LEFT HEART, PERCUTANEOUS APPROACH: ICD-10-PCS | Performed by: INTERNAL MEDICINE

## 2019-11-06 PROCEDURE — 2580000003 HC RX 258: Performed by: INTERNAL MEDICINE

## 2019-11-06 PROCEDURE — C1894 INTRO/SHEATH, NON-LASER: HCPCS

## 2019-11-06 PROCEDURE — 6370000000 HC RX 637 (ALT 250 FOR IP)

## 2019-11-06 PROCEDURE — 2709999900 HC NON-CHARGEABLE SUPPLY

## 2019-11-06 PROCEDURE — C1760 CLOSURE DEV, VASC: HCPCS

## 2019-11-06 PROCEDURE — 85347 COAGULATION TIME ACTIVATED: CPT

## 2019-11-06 PROCEDURE — C9606 PERC D-E COR REVASC W AMI S: HCPCS

## 2019-11-06 PROCEDURE — 2100000000 HC CCU R&B

## 2019-11-06 PROCEDURE — C1769 GUIDE WIRE: HCPCS

## 2019-11-06 PROCEDURE — 99285 EMERGENCY DEPT VISIT HI MDM: CPT

## 2019-11-06 PROCEDURE — 93458 L HRT ARTERY/VENTRICLE ANGIO: CPT

## 2019-11-06 PROCEDURE — 93010 ELECTROCARDIOGRAM REPORT: CPT | Performed by: INTERNAL MEDICINE

## 2019-11-06 PROCEDURE — 6370000000 HC RX 637 (ALT 250 FOR IP): Performed by: INTERNAL MEDICINE

## 2019-11-06 PROCEDURE — 6360000002 HC RX W HCPCS: Performed by: INTERNAL MEDICINE

## 2019-11-06 PROCEDURE — C1874 STENT, COATED/COV W/DEL SYS: HCPCS

## 2019-11-06 PROCEDURE — C1725 CATH, TRANSLUMIN NON-LASER: HCPCS

## 2019-11-06 PROCEDURE — 2580000003 HC RX 258

## 2019-11-06 PROCEDURE — 36415 COLL VENOUS BLD VENIPUNCTURE: CPT

## 2019-11-06 PROCEDURE — C1887 CATHETER, GUIDING: HCPCS

## 2019-11-06 RX ORDER — SODIUM CHLORIDE 0.9 % (FLUSH) 0.9 %
10 SYRINGE (ML) INJECTION PRN
Status: DISCONTINUED | OUTPATIENT
Start: 2019-11-06 | End: 2019-11-06 | Stop reason: SDUPTHER

## 2019-11-06 RX ORDER — SODIUM CHLORIDE 0.9 % (FLUSH) 0.9 %
10 SYRINGE (ML) INJECTION EVERY 12 HOURS SCHEDULED
Status: DISCONTINUED | OUTPATIENT
Start: 2019-11-06 | End: 2019-11-08 | Stop reason: SDUPTHER

## 2019-11-06 RX ORDER — ONDANSETRON 2 MG/ML
4 INJECTION INTRAMUSCULAR; INTRAVENOUS EVERY 6 HOURS PRN
Status: DISCONTINUED | OUTPATIENT
Start: 2019-11-06 | End: 2019-11-19 | Stop reason: HOSPADM

## 2019-11-06 RX ORDER — ATROPINE SULFATE 0.4 MG/ML
0.5 AMPUL (ML) INJECTION
Status: ACTIVE | OUTPATIENT
Start: 2019-11-06 | End: 2019-11-06

## 2019-11-06 RX ORDER — ATORVASTATIN CALCIUM 40 MG/1
80 TABLET, FILM COATED ORAL NIGHTLY
Status: DISCONTINUED | OUTPATIENT
Start: 2019-11-06 | End: 2019-11-08

## 2019-11-06 RX ORDER — SODIUM CHLORIDE 9 MG/ML
INJECTION, SOLUTION INTRAVENOUS CONTINUOUS
Status: DISCONTINUED | OUTPATIENT
Start: 2019-11-06 | End: 2019-11-07

## 2019-11-06 RX ORDER — METOPROLOL TARTRATE 50 MG/1
50 TABLET, FILM COATED ORAL 2 TIMES DAILY
Status: DISCONTINUED | OUTPATIENT
Start: 2019-11-06 | End: 2019-11-08

## 2019-11-06 RX ORDER — SODIUM CHLORIDE 0.9 % (FLUSH) 0.9 %
10 SYRINGE (ML) INJECTION PRN
Status: DISCONTINUED | OUTPATIENT
Start: 2019-11-06 | End: 2019-11-08 | Stop reason: SDUPTHER

## 2019-11-06 RX ORDER — MORPHINE SULFATE 4 MG/ML
1 INJECTION, SOLUTION INTRAMUSCULAR; INTRAVENOUS
Status: ACTIVE | OUTPATIENT
Start: 2019-11-06 | End: 2019-11-06

## 2019-11-06 RX ORDER — ATORVASTATIN CALCIUM 10 MG/1
10 TABLET, FILM COATED ORAL DAILY
Status: CANCELLED | OUTPATIENT
Start: 2019-11-06

## 2019-11-06 RX ORDER — SODIUM CHLORIDE 0.9 % (FLUSH) 0.9 %
10 SYRINGE (ML) INJECTION EVERY 12 HOURS SCHEDULED
Status: DISCONTINUED | OUTPATIENT
Start: 2019-11-06 | End: 2019-11-06 | Stop reason: SDUPTHER

## 2019-11-06 RX ORDER — ASPIRIN 81 MG/1
81 TABLET, CHEWABLE ORAL DAILY
Status: DISCONTINUED | OUTPATIENT
Start: 2019-11-07 | End: 2019-11-19 | Stop reason: HOSPADM

## 2019-11-06 RX ORDER — FUROSEMIDE 10 MG/ML
20 INJECTION INTRAMUSCULAR; INTRAVENOUS ONCE
Status: COMPLETED | OUTPATIENT
Start: 2019-11-06 | End: 2019-11-06

## 2019-11-06 RX ORDER — ACETAMINOPHEN 325 MG/1
650 TABLET ORAL EVERY 4 HOURS PRN
Status: DISCONTINUED | OUTPATIENT
Start: 2019-11-06 | End: 2019-11-08 | Stop reason: SDUPTHER

## 2019-11-06 RX ORDER — LATANOPROST 50 UG/ML
1 SOLUTION/ DROPS OPHTHALMIC NIGHTLY
Status: DISCONTINUED | OUTPATIENT
Start: 2019-11-06 | End: 2019-11-19 | Stop reason: HOSPADM

## 2019-11-06 RX ORDER — PANTOPRAZOLE SODIUM 40 MG/1
40 TABLET, DELAYED RELEASE ORAL DAILY
Status: DISCONTINUED | OUTPATIENT
Start: 2019-11-06 | End: 2019-11-08

## 2019-11-06 RX ORDER — FUROSEMIDE 10 MG/ML
INJECTION INTRAMUSCULAR; INTRAVENOUS
Status: COMPLETED
Start: 2019-11-06 | End: 2019-11-06

## 2019-11-06 RX ADMIN — TICAGRELOR 90 MG: 90 TABLET ORAL at 20:57

## 2019-11-06 RX ADMIN — FUROSEMIDE 20 MG: 10 INJECTION, SOLUTION INTRAVENOUS at 16:16

## 2019-11-06 RX ADMIN — FUROSEMIDE 20 MG: 10 INJECTION INTRAMUSCULAR; INTRAVENOUS at 16:16

## 2019-11-06 RX ADMIN — PANTOPRAZOLE SODIUM 40 MG: 40 TABLET, DELAYED RELEASE ORAL at 15:02

## 2019-11-06 RX ADMIN — AMIODARONE HYDROCHLORIDE 150 MG: 50 INJECTION, SOLUTION INTRAVENOUS at 18:00

## 2019-11-06 RX ADMIN — SODIUM CHLORIDE: 9 INJECTION, SOLUTION INTRAVENOUS at 13:05

## 2019-11-06 RX ADMIN — METOPROLOL TARTRATE 50 MG: 50 TABLET, FILM COATED ORAL at 14:55

## 2019-11-06 RX ADMIN — AMIODARONE HYDROCHLORIDE 1 MG/MIN: 50 INJECTION, SOLUTION INTRAVENOUS at 18:19

## 2019-11-06 RX ADMIN — ACETAMINOPHEN 650 MG: 325 TABLET ORAL at 20:56

## 2019-11-06 RX ADMIN — Medication 10 ML: at 20:56

## 2019-11-06 RX ADMIN — LATANOPROST 1 DROP: 50 SOLUTION OPHTHALMIC at 20:55

## 2019-11-06 RX ADMIN — METOPROLOL TARTRATE 50 MG: 50 TABLET, FILM COATED ORAL at 20:56

## 2019-11-06 RX ADMIN — ATORVASTATIN CALCIUM 80 MG: 40 TABLET, FILM COATED ORAL at 20:56

## 2019-11-06 ASSESSMENT — PAIN DESCRIPTION - PAIN TYPE: TYPE: CHRONIC PAIN

## 2019-11-06 ASSESSMENT — PAIN SCALES - GENERAL
PAINLEVEL_OUTOF10: 0
PAINLEVEL_OUTOF10: 0
PAINLEVEL_OUTOF10: 5
PAINLEVEL_OUTOF10: 0
PAINLEVEL_OUTOF10: 2

## 2019-11-06 ASSESSMENT — PAIN DESCRIPTION - FREQUENCY: FREQUENCY: INTERMITTENT

## 2019-11-06 ASSESSMENT — PAIN - FUNCTIONAL ASSESSMENT: PAIN_FUNCTIONAL_ASSESSMENT: PREVENTS OR INTERFERES SOME ACTIVE ACTIVITIES AND ADLS

## 2019-11-06 ASSESSMENT — PAIN DESCRIPTION - ONSET: ONSET: GRADUAL

## 2019-11-06 ASSESSMENT — PAIN DESCRIPTION - PROGRESSION: CLINICAL_PROGRESSION: NOT CHANGED

## 2019-11-06 ASSESSMENT — PAIN DESCRIPTION - DESCRIPTORS: DESCRIPTORS: ACHING;DULL

## 2019-11-06 ASSESSMENT — PAIN DESCRIPTION - LOCATION: LOCATION: BACK;GENERALIZED

## 2019-11-07 ENCOUNTER — APPOINTMENT (OUTPATIENT)
Dept: GENERAL RADIOLOGY | Age: 84
DRG: 215 | End: 2019-11-07
Payer: MEDICARE

## 2019-11-07 PROBLEM — I21.9 ACUTE MI (HCC): Status: ACTIVE | Noted: 2019-11-07

## 2019-11-07 LAB
ALBUMIN SERPL-MCNC: 3.3 GM/DL (ref 3.4–5)
ALP BLD-CCNC: 123 IU/L (ref 40–128)
ALT SERPL-CCNC: 86 U/L (ref 10–40)
ANION GAP SERPL CALCULATED.3IONS-SCNC: 25 MMOL/L (ref 4–16)
AST SERPL-CCNC: 376 IU/L (ref 15–37)
BASOPHILS ABSOLUTE: 0 K/CU MM
BASOPHILS RELATIVE PERCENT: 0.3 % (ref 0–1)
BILIRUB SERPL-MCNC: 0.9 MG/DL (ref 0–1)
BUN BLDV-MCNC: 16 MG/DL (ref 6–23)
CALCIUM SERPL-MCNC: 10.1 MG/DL (ref 8.3–10.6)
CHLORIDE BLD-SCNC: 99 MMOL/L (ref 99–110)
CHOLESTEROL: 102 MG/DL
CO2: 14 MMOL/L (ref 21–32)
CREAT SERPL-MCNC: 1.1 MG/DL (ref 0.6–1.1)
DIFFERENTIAL TYPE: ABNORMAL
EOSINOPHILS ABSOLUTE: 0 K/CU MM
EOSINOPHILS RELATIVE PERCENT: 0 % (ref 0–3)
GFR AFRICAN AMERICAN: 57 ML/MIN/1.73M2
GFR NON-AFRICAN AMERICAN: 47 ML/MIN/1.73M2
GLUCOSE BLD-MCNC: 177 MG/DL (ref 70–99)
HCT VFR BLD CALC: 46.4 % (ref 37–47)
HDLC SERPL-MCNC: 42 MG/DL
HEMOGLOBIN: 13.2 GM/DL (ref 12.5–16)
IMMATURE NEUTROPHIL %: 0.5 % (ref 0–0.43)
LDL CHOLESTEROL DIRECT: 54 MG/DL
LYMPHOCYTES ABSOLUTE: 1.1 K/CU MM
LYMPHOCYTES RELATIVE PERCENT: 10.1 % (ref 24–44)
MCH RBC QN AUTO: 24.9 PG (ref 27–31)
MCHC RBC AUTO-ENTMCNC: 28.4 % (ref 32–36)
MCV RBC AUTO: 87.4 FL (ref 78–100)
MONOCYTES ABSOLUTE: 0.6 K/CU MM
MONOCYTES RELATIVE PERCENT: 5.4 % (ref 0–4)
NUCLEATED RBC %: 0 %
PDW BLD-RTO: 21.6 % (ref 11.7–14.9)
PLATELET # BLD: 310 K/CU MM (ref 140–440)
PMV BLD AUTO: 10.9 FL (ref 7.5–11.1)
POTASSIUM SERPL-SCNC: 5.2 MMOL/L (ref 3.5–5.1)
PRO-BNP: ABNORMAL PG/ML
RBC # BLD: 5.31 M/CU MM (ref 4.2–5.4)
SEGMENTED NEUTROPHILS ABSOLUTE COUNT: 8.8 K/CU MM
SEGMENTED NEUTROPHILS RELATIVE PERCENT: 83.7 % (ref 36–66)
SODIUM BLD-SCNC: 138 MMOL/L (ref 135–145)
TOTAL IMMATURE NEUTOROPHIL: 0.05 K/CU MM
TOTAL NUCLEATED RBC: 0 K/CU MM
TOTAL PROTEIN: 7.5 GM/DL (ref 6.4–8.2)
TRIGL SERPL-MCNC: 73 MG/DL
TROPONIN T: 11.28 NG/ML
WBC # BLD: 10.5 K/CU MM (ref 4–10.5)

## 2019-11-07 PROCEDURE — 6360000002 HC RX W HCPCS: Performed by: INTERNAL MEDICINE

## 2019-11-07 PROCEDURE — 80061 LIPID PANEL: CPT

## 2019-11-07 PROCEDURE — 6370000000 HC RX 637 (ALT 250 FOR IP): Performed by: INTERNAL MEDICINE

## 2019-11-07 PROCEDURE — 83880 ASSAY OF NATRIURETIC PEPTIDE: CPT

## 2019-11-07 PROCEDURE — 80053 COMPREHEN METABOLIC PANEL: CPT

## 2019-11-07 PROCEDURE — 2580000003 HC RX 258: Performed by: GENERAL PRACTICE

## 2019-11-07 PROCEDURE — 84484 ASSAY OF TROPONIN QUANT: CPT

## 2019-11-07 PROCEDURE — 83721 ASSAY OF BLOOD LIPOPROTEIN: CPT

## 2019-11-07 PROCEDURE — 94761 N-INVAS EAR/PLS OXIMETRY MLT: CPT

## 2019-11-07 PROCEDURE — 2140000000 HC CCU INTERMEDIATE R&B

## 2019-11-07 PROCEDURE — 6370000000 HC RX 637 (ALT 250 FOR IP): Performed by: GENERAL PRACTICE

## 2019-11-07 PROCEDURE — 36415 COLL VENOUS BLD VENIPUNCTURE: CPT

## 2019-11-07 PROCEDURE — 71045 X-RAY EXAM CHEST 1 VIEW: CPT

## 2019-11-07 PROCEDURE — 2500000003 HC RX 250 WO HCPCS: Performed by: GENERAL PRACTICE

## 2019-11-07 PROCEDURE — 85025 COMPLETE CBC W/AUTO DIFF WBC: CPT

## 2019-11-07 RX ORDER — FUROSEMIDE 10 MG/ML
20 INJECTION INTRAMUSCULAR; INTRAVENOUS ONCE
Status: COMPLETED | OUTPATIENT
Start: 2019-11-07 | End: 2019-11-07

## 2019-11-07 RX ORDER — AMIODARONE HYDROCHLORIDE 200 MG/1
200 TABLET ORAL DAILY
Status: DISCONTINUED | OUTPATIENT
Start: 2019-11-08 | End: 2019-11-08

## 2019-11-07 RX ORDER — FUROSEMIDE 20 MG/1
20 TABLET ORAL DAILY
Status: DISCONTINUED | OUTPATIENT
Start: 2019-11-07 | End: 2019-11-08

## 2019-11-07 RX ORDER — LISINOPRIL 2.5 MG/1
2.5 TABLET ORAL DAILY
Status: DISCONTINUED | OUTPATIENT
Start: 2019-11-07 | End: 2019-11-08

## 2019-11-07 RX ADMIN — ATORVASTATIN CALCIUM 80 MG: 40 TABLET, FILM COATED ORAL at 20:38

## 2019-11-07 RX ADMIN — DEXTROSE MONOHYDRATE 300 MG: 50 INJECTION, SOLUTION INTRAVENOUS at 23:56

## 2019-11-07 RX ADMIN — ENOXAPARIN SODIUM 30 MG: 30 INJECTION SUBCUTANEOUS at 20:47

## 2019-11-07 RX ADMIN — METOPROLOL TARTRATE 50 MG: 50 TABLET, FILM COATED ORAL at 09:15

## 2019-11-07 RX ADMIN — TICAGRELOR 90 MG: 90 TABLET ORAL at 20:38

## 2019-11-07 RX ADMIN — PANTOPRAZOLE SODIUM 40 MG: 40 TABLET, DELAYED RELEASE ORAL at 09:15

## 2019-11-07 RX ADMIN — METOPROLOL TARTRATE 50 MG: 50 TABLET, FILM COATED ORAL at 20:38

## 2019-11-07 RX ADMIN — FUROSEMIDE 20 MG: 10 INJECTION, SOLUTION INTRAVENOUS at 09:14

## 2019-11-07 RX ADMIN — TICAGRELOR 90 MG: 90 TABLET ORAL at 09:15

## 2019-11-07 RX ADMIN — Medication 10 ML: at 20:38

## 2019-11-07 RX ADMIN — Medication 10 ML: at 09:14

## 2019-11-07 RX ADMIN — LISINOPRIL 2.5 MG: 2.5 TABLET ORAL at 09:15

## 2019-11-07 RX ADMIN — ASPIRIN 81 MG 81 MG: 81 TABLET ORAL at 09:15

## 2019-11-07 ASSESSMENT — PAIN SCALES - GENERAL
PAINLEVEL_OUTOF10: 0

## 2019-11-07 ASSESSMENT — PAIN DESCRIPTION - PROGRESSION
CLINICAL_PROGRESSION: NOT CHANGED
CLINICAL_PROGRESSION: NOT CHANGED

## 2019-11-08 ENCOUNTER — APPOINTMENT (OUTPATIENT)
Dept: GENERAL RADIOLOGY | Age: 84
DRG: 215 | End: 2019-11-08
Payer: MEDICARE

## 2019-11-08 ENCOUNTER — APPOINTMENT (OUTPATIENT)
Dept: CT IMAGING | Age: 84
DRG: 215 | End: 2019-11-08
Payer: MEDICARE

## 2019-11-08 PROBLEM — J69.0 ASPIRATION PNEUMONIA (HCC): Status: ACTIVE | Noted: 2019-11-08

## 2019-11-08 PROBLEM — I50.21 ACUTE SYSTOLIC (CONGESTIVE) HEART FAILURE (HCC): Status: ACTIVE | Noted: 2019-11-08

## 2019-11-08 PROBLEM — K72.00 SHOCK LIVER: Status: ACTIVE | Noted: 2019-11-08

## 2019-11-08 PROBLEM — G93.41 ACUTE METABOLIC ENCEPHALOPATHY: Status: ACTIVE | Noted: 2019-11-08

## 2019-11-08 LAB
ABO/RH: NORMAL
ACTIVATED CLOTTING TIME, LOW RANGE: 160 SEC
ACTIVATED CLOTTING TIME, LOW RANGE: 162 SEC
ACTIVATED CLOTTING TIME, LOW RANGE: 169 SEC
ACTIVATED CLOTTING TIME, LOW RANGE: 171 SEC
ACTIVATED CLOTTING TIME, LOW RANGE: 175 SEC
ACTIVATED CLOTTING TIME, LOW RANGE: 176 SEC
ACTIVATED CLOTTING TIME, LOW RANGE: 181 SEC
ACTIVATED CLOTTING TIME, LOW RANGE: 196 SEC
ACTIVATED CLOTTING TIME, LOW RANGE: 222 SEC
ACTIVATED CLOTTING TIME, LOW RANGE: 229 SEC
ACTIVATED CLOTTING TIME, LOW RANGE: 305 SEC
ALBUMIN SERPL-MCNC: 2.9 GM/DL (ref 3.4–5)
ALBUMIN SERPL-MCNC: 3.2 GM/DL (ref 3.4–5)
ALP BLD-CCNC: 113 IU/L (ref 40–128)
ALP BLD-CCNC: 123 IU/L (ref 40–128)
ALT SERPL-CCNC: 1262 U/L (ref 10–40)
ALT SERPL-CCNC: 1564 U/L (ref 10–40)
AMMONIA: 41 UMOL/L (ref 11–51)
ANION GAP SERPL CALCULATED.3IONS-SCNC: 21 MMOL/L (ref 4–16)
ANION GAP SERPL CALCULATED.3IONS-SCNC: 23 MMOL/L (ref 4–16)
ANTIBODY SCREEN: NEGATIVE
APTT: 44.8 SECONDS (ref 25.1–37.1)
APTT: 68.3 SECONDS (ref 25.1–37.1)
AST SERPL-CCNC: 2123 IU/L (ref 15–37)
AST SERPL-CCNC: 2524 IU/L (ref 15–37)
BACTERIA: ABNORMAL /HPF
BASE EXCESS MIXED: ABNORMAL (ref 0–2.3)
BASE EXCESS: ABNORMAL (ref 0–2.4)
BASOPHILS ABSOLUTE: 0 K/CU MM
BASOPHILS RELATIVE PERCENT: 0.2 % (ref 0–1)
BILIRUB SERPL-MCNC: 1.4 MG/DL (ref 0–1)
BILIRUB SERPL-MCNC: 1.5 MG/DL (ref 0–1)
BILIRUBIN URINE: NEGATIVE MG/DL
BLOOD, URINE: ABNORMAL
BUN BLDV-MCNC: 26 MG/DL (ref 6–23)
BUN BLDV-MCNC: 28 MG/DL (ref 6–23)
CALCIUM SERPL-MCNC: 10.3 MG/DL (ref 8.3–10.6)
CALCIUM SERPL-MCNC: 10.4 MG/DL (ref 8.3–10.6)
CARBON MONOXIDE, BLOOD: 1.8 % (ref 0–5)
CARBON MONOXIDE, BLOOD: 2 % (ref 0–5)
CHLORIDE BLD-SCNC: 95 MMOL/L (ref 99–110)
CHLORIDE BLD-SCNC: 95 MMOL/L (ref 99–110)
CLARITY: ABNORMAL
CO2 CONTENT: 16.2 MMOL/L (ref 19–24)
CO2 CONTENT: 20.7 MMOL/L (ref 19–24)
CO2: 17 MMOL/L (ref 21–32)
CO2: 20 MMOL/L (ref 21–32)
COLOR: ABNORMAL
COMMENT: ABNORMAL
COMMENT: ABNORMAL
CREAT SERPL-MCNC: 1.3 MG/DL (ref 0.6–1.1)
CREAT SERPL-MCNC: 1.3 MG/DL (ref 0.6–1.1)
DIFFERENTIAL TYPE: ABNORMAL
EKG DIAGNOSIS: NORMAL
EKG Q-T INTERVAL: 450 MS
EKG QRS DURATION: 132 MS
EKG QTC CALCULATION (BAZETT): 577 MS
EKG R AXIS: -83 DEGREES
EKG T AXIS: 96 DEGREES
EKG VENTRICULAR RATE: 99 BPM
EOSINOPHILS ABSOLUTE: 0 K/CU MM
EOSINOPHILS RELATIVE PERCENT: 0 % (ref 0–3)
GFR AFRICAN AMERICAN: 47 ML/MIN/1.73M2
GFR AFRICAN AMERICAN: 47 ML/MIN/1.73M2
GFR NON-AFRICAN AMERICAN: 39 ML/MIN/1.73M2
GFR NON-AFRICAN AMERICAN: 39 ML/MIN/1.73M2
GLUCOSE BLD-MCNC: 134 MG/DL (ref 70–99)
GLUCOSE BLD-MCNC: 137 MG/DL (ref 70–99)
GLUCOSE BLD-MCNC: 139 MG/DL (ref 70–99)
GLUCOSE, URINE: NEGATIVE MG/DL
HAV IGM SER IA-ACNC: NON REACTIVE
HCO3 ARTERIAL: 15.6 MMOL/L (ref 18–23)
HCO3 ARTERIAL: 20.1 MMOL/L (ref 18–23)
HCT VFR BLD CALC: 39.4 % (ref 37–47)
HCT VFR BLD CALC: 45 % (ref 37–47)
HEMOGLOBIN: 12.5 GM/DL (ref 12.5–16)
HEMOGLOBIN: 13.5 GM/DL (ref 12.5–16)
HEPATITIS B CORE IGM ANTIBODY: NON REACTIVE
HEPATITIS B SURFACE ANTIGEN: NON REACTIVE
HEPATITIS C ANTIBODY: NON REACTIVE
HYALINE CASTS: >20 /LPF
IMMATURE NEUTROPHIL %: 0.7 % (ref 0–0.43)
INR BLD: 2.54 INDEX
INR BLD: 2.68 INDEX
INR BLD: 2.69 INDEX
KETONES, URINE: NEGATIVE MG/DL
LACTATE: ABNORMAL MMOL/L (ref 0.4–2)
LEUKOCYTE ESTERASE, URINE: ABNORMAL
LYMPHOCYTES ABSOLUTE: 0.9 K/CU MM
LYMPHOCYTES RELATIVE PERCENT: 5.2 % (ref 24–44)
MCH RBC QN AUTO: 24.8 PG (ref 27–31)
MCH RBC QN AUTO: 25.5 PG (ref 27–31)
MCHC RBC AUTO-ENTMCNC: 30 % (ref 32–36)
MCHC RBC AUTO-ENTMCNC: 31.7 % (ref 32–36)
MCV RBC AUTO: 80.2 FL (ref 78–100)
MCV RBC AUTO: 82.6 FL (ref 78–100)
METHEMOGLOBIN ARTERIAL: 0.9 %
METHEMOGLOBIN ARTERIAL: 1.1 %
MONOCYTES ABSOLUTE: 0.6 K/CU MM
MONOCYTES RELATIVE PERCENT: 3.3 % (ref 0–4)
MUCUS: ABNORMAL HPF
NITRITE URINE, QUANTITATIVE: NEGATIVE
NON SQUAM EPI CELLS: 1 /HPF
NUCLEATED RBC %: 0 %
O2 SATURATION: 93.6 % (ref 96–97)
O2 SATURATION: 95.6 % (ref 96–97)
PCO2 ARTERIAL: 20 MMHG (ref 32–45)
PCO2 ARTERIAL: 21 MMHG (ref 32–45)
PDW BLD-RTO: 21.6 % (ref 11.7–14.9)
PDW BLD-RTO: 22.1 % (ref 11.7–14.9)
PH BLOOD: 7.48 (ref 7.34–7.45)
PH BLOOD: 7.61 (ref 7.34–7.45)
PH, URINE: 5 (ref 5–8)
PLATELET # BLD: 265 K/CU MM (ref 140–440)
PLATELET # BLD: 274 K/CU MM (ref 140–440)
PMV BLD AUTO: 11.5 FL (ref 7.5–11.1)
PO2 ARTERIAL: 66 MMHG (ref 75–100)
PO2 ARTERIAL: 83 MMHG (ref 75–100)
POTASSIUM SERPL-SCNC: 4.5 MMOL/L (ref 3.5–5.1)
POTASSIUM SERPL-SCNC: 4.7 MMOL/L (ref 3.5–5.1)
PRO-BNP: ABNORMAL PG/ML
PROTEIN UA: 30 MG/DL
PROTHROMBIN TIME: 29.7 SECONDS (ref 11.7–14.5)
PROTHROMBIN TIME: 30.8 SECONDS (ref 11.7–14.5)
PROTHROMBIN TIME: 31 SECONDS (ref 11.7–14.5)
RBC # BLD: 4.91 M/CU MM (ref 4.2–5.4)
RBC # BLD: 5.45 M/CU MM (ref 4.2–5.4)
RBC URINE: 10 /HPF (ref 0–6)
REASON FOR REJECTION: NORMAL
REJECTED TEST: NORMAL
REJECTED TEST: NORMAL
SEGMENTED NEUTROPHILS ABSOLUTE COUNT: 16.1 K/CU MM
SEGMENTED NEUTROPHILS RELATIVE PERCENT: 90.6 % (ref 36–66)
SODIUM BLD-SCNC: 135 MMOL/L (ref 135–145)
SODIUM BLD-SCNC: 136 MMOL/L (ref 135–145)
SPECIFIC GRAVITY UA: 1.03 (ref 1–1.03)
SQUAMOUS EPITHELIAL: 6 /HPF
TOTAL IMMATURE NEUTOROPHIL: 0.12 K/CU MM
TOTAL NUCLEATED RBC: 0 K/CU MM
TOTAL PROTEIN: 6.6 GM/DL (ref 6.4–8.2)
TOTAL PROTEIN: 7.3 GM/DL (ref 6.4–8.2)
TRICHOMONAS: ABNORMAL /HPF
TROPONIN T: 6.64 NG/ML
TROPONIN T: 6.91 NG/ML
UROBILINOGEN, URINE: NORMAL MG/DL (ref 0.2–1)
WBC # BLD: 15.8 K/CU MM (ref 4–10.5)
WBC # BLD: 17.7 K/CU MM (ref 4–10.5)
WBC UA: 18 /HPF (ref 0–5)

## 2019-11-08 PROCEDURE — 2580000003 HC RX 258

## 2019-11-08 PROCEDURE — 82140 ASSAY OF AMMONIA: CPT

## 2019-11-08 PROCEDURE — 6370000000 HC RX 637 (ALT 250 FOR IP): Performed by: INTERNAL MEDICINE

## 2019-11-08 PROCEDURE — 85347 COAGULATION TIME ACTIVATED: CPT

## 2019-11-08 PROCEDURE — 05HA33Z INSERTION OF INFUSION DEVICE INTO LEFT BRACHIAL VEIN, PERCUTANEOUS APPROACH: ICD-10-PCS | Performed by: INTERNAL MEDICINE

## 2019-11-08 PROCEDURE — 6360000002 HC RX W HCPCS

## 2019-11-08 PROCEDURE — 94761 N-INVAS EAR/PLS OXIMETRY MLT: CPT

## 2019-11-08 PROCEDURE — 5A0221D ASSISTANCE WITH CARDIAC OUTPUT USING IMPELLER PUMP, CONTINUOUS: ICD-10-PCS | Performed by: INTERNAL MEDICINE

## 2019-11-08 PROCEDURE — 2580000003 HC RX 258: Performed by: INTERNAL MEDICINE

## 2019-11-08 PROCEDURE — 83880 ASSAY OF NATRIURETIC PEPTIDE: CPT

## 2019-11-08 PROCEDURE — 36410 VNPNXR 3YR/> PHY/QHP DX/THER: CPT

## 2019-11-08 PROCEDURE — 6360000002 HC RX W HCPCS: Performed by: GENERAL PRACTICE

## 2019-11-08 PROCEDURE — 84145 PROCALCITONIN (PCT): CPT

## 2019-11-08 PROCEDURE — 83605 ASSAY OF LACTIC ACID: CPT

## 2019-11-08 PROCEDURE — 6360000002 HC RX W HCPCS: Performed by: INTERNAL MEDICINE

## 2019-11-08 PROCEDURE — 2500000003 HC RX 250 WO HCPCS

## 2019-11-08 PROCEDURE — 86901 BLOOD TYPING SEROLOGIC RH(D): CPT

## 2019-11-08 PROCEDURE — 81001 URINALYSIS AUTO W/SCOPE: CPT

## 2019-11-08 PROCEDURE — 33990 INSJ PERQ VAD L HRT ARTERIAL: CPT

## 2019-11-08 PROCEDURE — 87040 BLOOD CULTURE FOR BACTERIA: CPT

## 2019-11-08 PROCEDURE — 2580000003 HC RX 258: Performed by: GENERAL PRACTICE

## 2019-11-08 PROCEDURE — 85730 THROMBOPLASTIN TIME PARTIAL: CPT

## 2019-11-08 PROCEDURE — 02HA3RZ INSERTION OF SHORT-TERM EXTERNAL HEART ASSIST SYSTEM INTO HEART, PERCUTANEOUS APPROACH: ICD-10-PCS | Performed by: INTERNAL MEDICINE

## 2019-11-08 PROCEDURE — C1887 CATHETER, GUIDING: HCPCS

## 2019-11-08 PROCEDURE — 36600 WITHDRAWAL OF ARTERIAL BLOOD: CPT

## 2019-11-08 PROCEDURE — 99223 1ST HOSP IP/OBS HIGH 75: CPT | Performed by: PSYCHIATRY & NEUROLOGY

## 2019-11-08 PROCEDURE — 93308 TTE F-UP OR LMTD: CPT

## 2019-11-08 PROCEDURE — C1894 INTRO/SHEATH, NON-LASER: HCPCS

## 2019-11-08 PROCEDURE — B2111ZZ FLUOROSCOPY OF MULTIPLE CORONARY ARTERIES USING LOW OSMOLAR CONTRAST: ICD-10-PCS | Performed by: INTERNAL MEDICINE

## 2019-11-08 PROCEDURE — C1751 CATH, INF, PER/CENT/MIDLINE: HCPCS

## 2019-11-08 PROCEDURE — 2100000000 HC CCU R&B

## 2019-11-08 PROCEDURE — C1769 GUIDE WIRE: HCPCS

## 2019-11-08 PROCEDURE — 85610 PROTHROMBIN TIME: CPT

## 2019-11-08 PROCEDURE — 93010 ELECTROCARDIOGRAM REPORT: CPT | Performed by: INTERNAL MEDICINE

## 2019-11-08 PROCEDURE — 70450 CT HEAD/BRAIN W/O DYE: CPT

## 2019-11-08 PROCEDURE — 86850 RBC ANTIBODY SCREEN: CPT

## 2019-11-08 PROCEDURE — 74018 RADEX ABDOMEN 1 VIEW: CPT

## 2019-11-08 PROCEDURE — 93503 INSERT/PLACE HEART CATHETER: CPT

## 2019-11-08 PROCEDURE — 2720000010 HC SURG SUPPLY STERILE

## 2019-11-08 PROCEDURE — 80053 COMPREHEN METABOLIC PANEL: CPT

## 2019-11-08 PROCEDURE — 85027 COMPLETE CBC AUTOMATED: CPT

## 2019-11-08 PROCEDURE — 82962 GLUCOSE BLOOD TEST: CPT

## 2019-11-08 PROCEDURE — 4A023N7 MEASUREMENT OF CARDIAC SAMPLING AND PRESSURE, LEFT HEART, PERCUTANEOUS APPROACH: ICD-10-PCS | Performed by: INTERNAL MEDICINE

## 2019-11-08 PROCEDURE — 86900 BLOOD TYPING SEROLOGIC ABO: CPT

## 2019-11-08 PROCEDURE — 36556 INSERT NON-TUNNEL CV CATH: CPT

## 2019-11-08 PROCEDURE — 2500000003 HC RX 250 WO HCPCS: Performed by: INTERNAL MEDICINE

## 2019-11-08 PROCEDURE — 2709999900 HC NON-CHARGEABLE SUPPLY

## 2019-11-08 PROCEDURE — 80074 ACUTE HEPATITIS PANEL: CPT

## 2019-11-08 PROCEDURE — 82803 BLOOD GASES ANY COMBINATION: CPT

## 2019-11-08 PROCEDURE — 76937 US GUIDE VASCULAR ACCESS: CPT

## 2019-11-08 PROCEDURE — 93005 ELECTROCARDIOGRAM TRACING: CPT | Performed by: NURSE PRACTITIONER

## 2019-11-08 PROCEDURE — C9113 INJ PANTOPRAZOLE SODIUM, VIA: HCPCS | Performed by: INTERNAL MEDICINE

## 2019-11-08 PROCEDURE — 84484 ASSAY OF TROPONIN QUANT: CPT

## 2019-11-08 PROCEDURE — 80048 BASIC METABOLIC PNL TOTAL CA: CPT

## 2019-11-08 PROCEDURE — 36415 COLL VENOUS BLD VENIPUNCTURE: CPT

## 2019-11-08 PROCEDURE — P9017 PLASMA 1 DONOR FRZ W/IN 8 HR: HCPCS

## 2019-11-08 PROCEDURE — 85025 COMPLETE CBC W/AUTO DIFF WBC: CPT

## 2019-11-08 PROCEDURE — 93454 CORONARY ARTERY ANGIO S&I: CPT

## 2019-11-08 RX ORDER — SODIUM CHLORIDE 0.9 % (FLUSH) 0.9 %
10 SYRINGE (ML) INJECTION PRN
Status: DISCONTINUED | OUTPATIENT
Start: 2019-11-08 | End: 2019-11-08 | Stop reason: SDUPTHER

## 2019-11-08 RX ORDER — ACETAMINOPHEN 325 MG/1
650 TABLET ORAL EVERY 4 HOURS PRN
Status: DISCONTINUED | OUTPATIENT
Start: 2019-11-08 | End: 2019-11-19 | Stop reason: HOSPADM

## 2019-11-08 RX ORDER — ACETAMINOPHEN 325 MG/1
650 TABLET ORAL EVERY 4 HOURS PRN
Status: DISCONTINUED | OUTPATIENT
Start: 2019-11-08 | End: 2019-11-08 | Stop reason: SDUPTHER

## 2019-11-08 RX ORDER — SODIUM CHLORIDE 0.9 % (FLUSH) 0.9 %
10 SYRINGE (ML) INJECTION EVERY 12 HOURS SCHEDULED
Status: DISCONTINUED | OUTPATIENT
Start: 2019-11-08 | End: 2019-11-19 | Stop reason: HOSPADM

## 2019-11-08 RX ORDER — 0.9 % SODIUM CHLORIDE 0.9 %
250 INTRAVENOUS SOLUTION INTRAVENOUS ONCE
Status: COMPLETED | OUTPATIENT
Start: 2019-11-08 | End: 2019-11-12

## 2019-11-08 RX ORDER — MIDODRINE HYDROCHLORIDE 5 MG/1
10 TABLET ORAL
Status: DISCONTINUED | OUTPATIENT
Start: 2019-11-08 | End: 2019-11-19 | Stop reason: HOSPADM

## 2019-11-08 RX ORDER — SODIUM CHLORIDE 0.9 % (FLUSH) 0.9 %
10 SYRINGE (ML) INJECTION PRN
Status: DISCONTINUED | OUTPATIENT
Start: 2019-11-08 | End: 2019-11-19 | Stop reason: HOSPADM

## 2019-11-08 RX ORDER — MORPHINE SULFATE 4 MG/ML
1 INJECTION, SOLUTION INTRAMUSCULAR; INTRAVENOUS
Status: ACTIVE | OUTPATIENT
Start: 2019-11-08 | End: 2019-11-08

## 2019-11-08 RX ORDER — HEPARIN SODIUM 10000 [USP'U]/100ML
0-12 INJECTION, SOLUTION INTRAVENOUS CONTINUOUS PRN
Status: DISCONTINUED | OUTPATIENT
Start: 2019-11-08 | End: 2019-11-12

## 2019-11-08 RX ORDER — ATROPINE SULFATE 0.4 MG/ML
0.5 AMPUL (ML) INJECTION
Status: ACTIVE | OUTPATIENT
Start: 2019-11-08 | End: 2019-11-08

## 2019-11-08 RX ORDER — LIDOCAINE HYDROCHLORIDE 10 MG/ML
5 INJECTION, SOLUTION EPIDURAL; INFILTRATION; INTRACAUDAL; PERINEURAL ONCE
Status: COMPLETED | OUTPATIENT
Start: 2019-11-08 | End: 2019-11-08

## 2019-11-08 RX ORDER — HEPARIN SODIUM 10000 [USP'U]/100ML
400 INJECTION, SOLUTION INTRAVENOUS CONTINUOUS
Status: DISCONTINUED | OUTPATIENT
Start: 2019-11-08 | End: 2019-11-08

## 2019-11-08 RX ORDER — ASPIRIN 300 MG/1
300 SUPPOSITORY RECTAL ONCE
Status: DISCONTINUED | OUTPATIENT
Start: 2019-11-08 | End: 2019-11-08

## 2019-11-08 RX ORDER — PANTOPRAZOLE SODIUM 40 MG/10ML
40 INJECTION, POWDER, LYOPHILIZED, FOR SOLUTION INTRAVENOUS 2 TIMES DAILY
Status: DISCONTINUED | OUTPATIENT
Start: 2019-11-08 | End: 2019-11-14

## 2019-11-08 RX ORDER — FUROSEMIDE 10 MG/ML
20 INJECTION INTRAMUSCULAR; INTRAVENOUS ONCE
Status: COMPLETED | OUTPATIENT
Start: 2019-11-08 | End: 2019-11-08

## 2019-11-08 RX ORDER — SODIUM CHLORIDE 0.9 % (FLUSH) 0.9 %
10 SYRINGE (ML) INJECTION EVERY 12 HOURS SCHEDULED
Status: DISCONTINUED | OUTPATIENT
Start: 2019-11-08 | End: 2019-11-08 | Stop reason: SDUPTHER

## 2019-11-08 RX ADMIN — Medication 10 ML: at 21:13

## 2019-11-08 RX ADMIN — Medication 10 ML: at 21:12

## 2019-11-08 RX ADMIN — ASPIRIN 81 MG 81 MG: 81 TABLET ORAL at 17:50

## 2019-11-08 RX ADMIN — TICAGRELOR 90 MG: 90 TABLET ORAL at 17:50

## 2019-11-08 RX ADMIN — MEROPENEM 1 G: 1 INJECTION, POWDER, FOR SOLUTION INTRAVENOUS at 21:05

## 2019-11-08 RX ADMIN — LATANOPROST 1 DROP: 50 SOLUTION OPHTHALMIC at 23:15

## 2019-11-08 RX ADMIN — Medication 10 ML: at 10:28

## 2019-11-08 RX ADMIN — MEROPENEM 1 G: 1 INJECTION, POWDER, FOR SOLUTION INTRAVENOUS at 10:27

## 2019-11-08 RX ADMIN — HEPARIN SODIUM 25000 UNITS: 10000 INJECTION INTRAVENOUS; SUBCUTANEOUS at 17:52

## 2019-11-08 RX ADMIN — PANTOPRAZOLE SODIUM 40 MG: 40 INJECTION, POWDER, FOR SOLUTION INTRAVENOUS at 21:05

## 2019-11-08 RX ADMIN — LIDOCAINE HYDROCHLORIDE 5 ML: 10 INJECTION, SOLUTION EPIDURAL; INFILTRATION; INTRACAUDAL; PERINEURAL at 16:30

## 2019-11-08 RX ADMIN — SODIUM BICARBONATE: 84 INJECTION, SOLUTION INTRAVENOUS at 09:34

## 2019-11-08 RX ADMIN — Medication 10 ML: at 21:06

## 2019-11-08 RX ADMIN — FUROSEMIDE 20 MG: 10 INJECTION, SOLUTION INTRAVENOUS at 21:34

## 2019-11-08 RX ADMIN — Medication 10 ML: at 21:11

## 2019-11-08 ASSESSMENT — PAIN SCALES - GENERAL
PAINLEVEL_OUTOF10: 0

## 2019-11-08 ASSESSMENT — PAIN SCALES - WONG BAKER
WONGBAKER_NUMERICALRESPONSE: 0

## 2019-11-08 ASSESSMENT — PAIN DESCRIPTION - PROGRESSION
CLINICAL_PROGRESSION: NOT CHANGED
CLINICAL_PROGRESSION: NOT CHANGED

## 2019-11-09 ENCOUNTER — APPOINTMENT (OUTPATIENT)
Dept: GENERAL RADIOLOGY | Age: 84
DRG: 215 | End: 2019-11-09
Payer: MEDICARE

## 2019-11-09 LAB
ACTIVATED CLOTTING TIME, LOW RANGE: 167 SEC
ACTIVATED CLOTTING TIME, LOW RANGE: 170 SEC
ACTIVATED CLOTTING TIME, LOW RANGE: 171 SEC
ACTIVATED CLOTTING TIME, LOW RANGE: 174 SEC
ACTIVATED CLOTTING TIME, LOW RANGE: 174 SEC
ACTIVATED CLOTTING TIME, LOW RANGE: 176 SEC
ACTIVATED CLOTTING TIME, LOW RANGE: 176 SEC
ACTIVATED CLOTTING TIME, LOW RANGE: 177 SEC
ACTIVATED CLOTTING TIME, LOW RANGE: 177 SEC
ACTIVATED CLOTTING TIME, LOW RANGE: 178 SEC
ACTIVATED CLOTTING TIME, LOW RANGE: 179 SEC
ACTIVATED CLOTTING TIME, LOW RANGE: 180 SEC
ACTIVATED CLOTTING TIME, LOW RANGE: 180 SEC
ALBUMIN SERPL-MCNC: 2.5 GM/DL (ref 3.4–5)
ALP BLD-CCNC: 124 IU/L (ref 40–128)
ALT SERPL-CCNC: 1128 U/L (ref 10–40)
ANION GAP SERPL CALCULATED.3IONS-SCNC: 13 MMOL/L (ref 4–16)
APTT: 83 SECONDS (ref 25.1–37.1)
APTT: 84.3 SECONDS (ref 25.1–37.1)
APTT: 87.2 SECONDS (ref 25.1–37.1)
APTT: 99.6 SECONDS (ref 25.1–37.1)
AST SERPL-CCNC: 1395 IU/L (ref 15–37)
BASOPHILS ABSOLUTE: 0 K/CU MM
BASOPHILS RELATIVE PERCENT: 0.2 % (ref 0–1)
BILIRUB SERPL-MCNC: 3.2 MG/DL (ref 0–1)
BUN BLDV-MCNC: 38 MG/DL (ref 6–23)
CALCIUM SERPL-MCNC: 8.9 MG/DL (ref 8.3–10.6)
CHLORIDE BLD-SCNC: 99 MMOL/L (ref 99–110)
CO2: 24 MMOL/L (ref 21–32)
CREAT SERPL-MCNC: 1.1 MG/DL (ref 0.6–1.1)
DIFFERENTIAL TYPE: ABNORMAL
EOSINOPHILS ABSOLUTE: 0 K/CU MM
EOSINOPHILS RELATIVE PERCENT: 0.1 % (ref 0–3)
GFR AFRICAN AMERICAN: 57 ML/MIN/1.73M2
GFR NON-AFRICAN AMERICAN: 47 ML/MIN/1.73M2
GLUCOSE BLD-MCNC: 158 MG/DL (ref 70–99)
HCT VFR BLD CALC: 39.1 % (ref 37–47)
HEMOGLOBIN: 12.1 GM/DL (ref 12.5–16)
IMMATURE NEUTROPHIL %: 0.9 % (ref 0–0.43)
INR BLD: 1.75 INDEX
INR BLD: 1.89 INDEX
INR BLD: 2.13 INDEX
INR BLD: 2.49 INDEX
LACTATE: 1.9 MMOL/L (ref 0.4–2)
LYMPHOCYTES ABSOLUTE: 1.3 K/CU MM
LYMPHOCYTES RELATIVE PERCENT: 8 % (ref 24–44)
MAGNESIUM: 2 MG/DL (ref 1.8–2.4)
MCH RBC QN AUTO: 24.9 PG (ref 27–31)
MCHC RBC AUTO-ENTMCNC: 30.9 % (ref 32–36)
MCV RBC AUTO: 80.5 FL (ref 78–100)
MONOCYTES ABSOLUTE: 0.5 K/CU MM
MONOCYTES RELATIVE PERCENT: 3.2 % (ref 0–4)
NUCLEATED RBC %: 0.5 %
PDW BLD-RTO: 21.2 % (ref 11.7–14.9)
PHOSPHORUS: 2.8 MG/DL (ref 2.5–4.9)
PLATELET # BLD: 250 K/CU MM (ref 140–440)
PMV BLD AUTO: 11.9 FL (ref 7.5–11.1)
POTASSIUM SERPL-SCNC: 3.3 MMOL/L (ref 3.5–5.1)
PRO-BNP: ABNORMAL PG/ML
PROCALCITONIN: 0.3
PROCALCITONIN: 0.34
PROTHROMBIN TIME: 20.4 SECONDS (ref 11.7–14.5)
PROTHROMBIN TIME: 21.7 SECONDS (ref 11.7–14.5)
PROTHROMBIN TIME: 24.9 SECONDS (ref 11.7–14.5)
PROTHROMBIN TIME: 28.7 SECONDS (ref 11.7–14.5)
RBC # BLD: 4.86 M/CU MM (ref 4.2–5.4)
SEGMENTED NEUTROPHILS ABSOLUTE COUNT: 13.7 K/CU MM
SEGMENTED NEUTROPHILS RELATIVE PERCENT: 87.6 % (ref 36–66)
SODIUM BLD-SCNC: 136 MMOL/L (ref 135–145)
TOTAL IMMATURE NEUTOROPHIL: 0.14 K/CU MM
TOTAL NUCLEATED RBC: 0.1 K/CU MM
TOTAL PROTEIN: 5.9 GM/DL (ref 6.4–8.2)
TROPONIN T: 5.21 NG/ML
WBC # BLD: 15.7 K/CU MM (ref 4–10.5)

## 2019-11-09 PROCEDURE — 2100000000 HC CCU R&B

## 2019-11-09 PROCEDURE — 84484 ASSAY OF TROPONIN QUANT: CPT

## 2019-11-09 PROCEDURE — 6370000000 HC RX 637 (ALT 250 FOR IP): Performed by: INTERNAL MEDICINE

## 2019-11-09 PROCEDURE — 6360000002 HC RX W HCPCS: Performed by: INTERNAL MEDICINE

## 2019-11-09 PROCEDURE — 99233 SBSQ HOSP IP/OBS HIGH 50: CPT | Performed by: INTERNAL MEDICINE

## 2019-11-09 PROCEDURE — 2580000003 HC RX 258: Performed by: INTERNAL MEDICINE

## 2019-11-09 PROCEDURE — 85025 COMPLETE CBC W/AUTO DIFF WBC: CPT

## 2019-11-09 PROCEDURE — 71045 X-RAY EXAM CHEST 1 VIEW: CPT

## 2019-11-09 PROCEDURE — 85610 PROTHROMBIN TIME: CPT

## 2019-11-09 PROCEDURE — 80053 COMPREHEN METABOLIC PANEL: CPT

## 2019-11-09 PROCEDURE — 83880 ASSAY OF NATRIURETIC PEPTIDE: CPT

## 2019-11-09 PROCEDURE — 85347 COAGULATION TIME ACTIVATED: CPT

## 2019-11-09 PROCEDURE — C9113 INJ PANTOPRAZOLE SODIUM, VIA: HCPCS | Performed by: INTERNAL MEDICINE

## 2019-11-09 PROCEDURE — 85730 THROMBOPLASTIN TIME PARTIAL: CPT

## 2019-11-09 PROCEDURE — 93308 TTE F-UP OR LMTD: CPT

## 2019-11-09 PROCEDURE — 84145 PROCALCITONIN (PCT): CPT

## 2019-11-09 PROCEDURE — 2500000003 HC RX 250 WO HCPCS: Performed by: INTERNAL MEDICINE

## 2019-11-09 PROCEDURE — 83735 ASSAY OF MAGNESIUM: CPT

## 2019-11-09 PROCEDURE — 83605 ASSAY OF LACTIC ACID: CPT

## 2019-11-09 PROCEDURE — 84100 ASSAY OF PHOSPHORUS: CPT

## 2019-11-09 PROCEDURE — 6360000002 HC RX W HCPCS

## 2019-11-09 PROCEDURE — 99233 SBSQ HOSP IP/OBS HIGH 50: CPT | Performed by: NURSE PRACTITIONER

## 2019-11-09 RX ORDER — FUROSEMIDE 10 MG/ML
20 INJECTION INTRAMUSCULAR; INTRAVENOUS ONCE
Status: COMPLETED | OUTPATIENT
Start: 2019-11-09 | End: 2019-11-09

## 2019-11-09 RX ORDER — POTASSIUM CHLORIDE 29.8 MG/ML
20 INJECTION INTRAVENOUS ONCE
Status: COMPLETED | OUTPATIENT
Start: 2019-11-09 | End: 2019-11-09

## 2019-11-09 RX ORDER — POTASSIUM CHLORIDE 20 MEQ/1
20 TABLET, EXTENDED RELEASE ORAL ONCE
Status: COMPLETED | OUTPATIENT
Start: 2019-11-09 | End: 2019-11-09

## 2019-11-09 RX ORDER — FUROSEMIDE 10 MG/ML
INJECTION INTRAMUSCULAR; INTRAVENOUS
Status: COMPLETED
Start: 2019-11-09 | End: 2019-11-09

## 2019-11-09 RX ADMIN — ASPIRIN 81 MG 81 MG: 81 TABLET ORAL at 11:13

## 2019-11-09 RX ADMIN — FUROSEMIDE 20 MG: 10 INJECTION INTRAMUSCULAR; INTRAVENOUS at 14:48

## 2019-11-09 RX ADMIN — TICAGRELOR 90 MG: 90 TABLET ORAL at 11:13

## 2019-11-09 RX ADMIN — METOPROLOL TARTRATE 25 MG: 25 TABLET ORAL at 11:20

## 2019-11-09 RX ADMIN — Medication 10 ML: at 11:14

## 2019-11-09 RX ADMIN — Medication 10 ML: at 20:57

## 2019-11-09 RX ADMIN — POTASSIUM CHLORIDE 20 MEQ: 20 TABLET, EXTENDED RELEASE ORAL at 14:45

## 2019-11-09 RX ADMIN — PANTOPRAZOLE SODIUM 40 MG: 40 INJECTION, POWDER, FOR SOLUTION INTRAVENOUS at 11:10

## 2019-11-09 RX ADMIN — METOPROLOL TARTRATE 25 MG: 25 TABLET ORAL at 20:54

## 2019-11-09 RX ADMIN — MEROPENEM 1 G: 1 INJECTION, POWDER, FOR SOLUTION INTRAVENOUS at 11:11

## 2019-11-09 RX ADMIN — FUROSEMIDE 20 MG: 10 INJECTION, SOLUTION INTRAVENOUS at 14:48

## 2019-11-09 RX ADMIN — HEPARIN SODIUM 25000 UNITS: 10000 INJECTION INTRAVENOUS; SUBCUTANEOUS at 18:06

## 2019-11-09 RX ADMIN — PANTOPRAZOLE SODIUM 40 MG: 40 INJECTION, POWDER, FOR SOLUTION INTRAVENOUS at 20:54

## 2019-11-09 RX ADMIN — MEROPENEM 1 G: 1 INJECTION, POWDER, FOR SOLUTION INTRAVENOUS at 20:54

## 2019-11-09 RX ADMIN — Medication 10 ML: at 20:55

## 2019-11-09 RX ADMIN — POTASSIUM CHLORIDE: 2 INJECTION, SOLUTION, CONCENTRATE INTRAVENOUS at 11:14

## 2019-11-09 RX ADMIN — TICAGRELOR 90 MG: 90 TABLET ORAL at 20:54

## 2019-11-09 RX ADMIN — LATANOPROST 1 DROP: 50 SOLUTION OPHTHALMIC at 20:54

## 2019-11-09 RX ADMIN — POTASSIUM CHLORIDE 20 MEQ: 29.8 INJECTION, SOLUTION INTRAVENOUS at 11:10

## 2019-11-09 ASSESSMENT — PAIN SCALES - WONG BAKER

## 2019-11-09 ASSESSMENT — PAIN SCALES - GENERAL
PAINLEVEL_OUTOF10: 0

## 2019-11-10 ENCOUNTER — APPOINTMENT (OUTPATIENT)
Dept: GENERAL RADIOLOGY | Age: 84
DRG: 215 | End: 2019-11-10
Payer: MEDICARE

## 2019-11-10 LAB
ACTIVATED CLOTTING TIME, LOW RANGE: 161 SEC
ACTIVATED CLOTTING TIME, LOW RANGE: 162 SEC
ACTIVATED CLOTTING TIME, LOW RANGE: 163 SEC
ACTIVATED CLOTTING TIME, LOW RANGE: 164 SEC
ACTIVATED CLOTTING TIME, LOW RANGE: 167 SEC
ACTIVATED CLOTTING TIME, LOW RANGE: 170 SEC
ACTIVATED CLOTTING TIME, LOW RANGE: 171 SEC
ACTIVATED CLOTTING TIME, LOW RANGE: 172 SEC
ACTIVATED CLOTTING TIME, LOW RANGE: 172 SEC
ACTIVATED CLOTTING TIME, LOW RANGE: 173 SEC
ACTIVATED CLOTTING TIME, LOW RANGE: 174 SEC
ALBUMIN SERPL-MCNC: 2.3 GM/DL (ref 3.4–5)
ALP BLD-CCNC: 134 IU/L (ref 40–128)
ALT SERPL-CCNC: 855 U/L (ref 10–40)
ANION GAP SERPL CALCULATED.3IONS-SCNC: 12 MMOL/L (ref 4–16)
APTT: 83.4 SECONDS (ref 25.1–37.1)
APTT: 85.3 SECONDS (ref 25.1–37.1)
APTT: 93.5 SECONDS (ref 25.1–37.1)
APTT: 94.9 SECONDS (ref 25.1–37.1)
AST SERPL-CCNC: 730 IU/L (ref 15–37)
BILIRUB SERPL-MCNC: 2.2 MG/DL (ref 0–1)
BUN BLDV-MCNC: 31 MG/DL (ref 6–23)
CALCIUM SERPL-MCNC: 8.4 MG/DL (ref 8.3–10.6)
CHLORIDE BLD-SCNC: 99 MMOL/L (ref 99–110)
CO2: 24 MMOL/L (ref 21–32)
CREAT SERPL-MCNC: 0.9 MG/DL (ref 0.6–1.1)
GFR AFRICAN AMERICAN: >60 ML/MIN/1.73M2
GFR NON-AFRICAN AMERICAN: 60 ML/MIN/1.73M2
GLUCOSE BLD-MCNC: 158 MG/DL (ref 70–99)
HCT VFR BLD CALC: 36.1 % (ref 37–47)
HEMOGLOBIN: 11.5 GM/DL (ref 12.5–16)
INR BLD: 1.5 INDEX
INR BLD: 1.53 INDEX
INR BLD: 1.55 INDEX
INR BLD: 1.71 INDEX
LACTATE: 1.6 MMOL/L (ref 0.4–2)
MAGNESIUM: 1.7 MG/DL (ref 1.8–2.4)
MCH RBC QN AUTO: 25.4 PG (ref 27–31)
MCHC RBC AUTO-ENTMCNC: 31.9 % (ref 32–36)
MCV RBC AUTO: 79.9 FL (ref 78–100)
PDW BLD-RTO: 21.5 % (ref 11.7–14.9)
PLATELET # BLD: 196 K/CU MM (ref 140–440)
PMV BLD AUTO: 12.2 FL (ref 7.5–11.1)
POTASSIUM SERPL-SCNC: 3.3 MMOL/L (ref 3.5–5.1)
POTASSIUM SERPL-SCNC: 3.5 MMOL/L (ref 3.5–5.1)
POTASSIUM SERPL-SCNC: 3.9 MMOL/L (ref 3.5–5.1)
PROTHROMBIN TIME: 17.2 SECONDS (ref 11.7–14.5)
PROTHROMBIN TIME: 17.7 SECONDS (ref 11.7–14.5)
PROTHROMBIN TIME: 17.8 SECONDS (ref 11.7–14.5)
PROTHROMBIN TIME: 19.9 SECONDS (ref 11.7–14.5)
RBC # BLD: 4.52 M/CU MM (ref 4.2–5.4)
SODIUM BLD-SCNC: 135 MMOL/L (ref 135–145)
TOTAL PROTEIN: 5.5 GM/DL (ref 6.4–8.2)
WBC # BLD: 12.2 K/CU MM (ref 4–10.5)

## 2019-11-10 PROCEDURE — 2580000003 HC RX 258: Performed by: INTERNAL MEDICINE

## 2019-11-10 PROCEDURE — 2100000000 HC CCU R&B

## 2019-11-10 PROCEDURE — 85347 COAGULATION TIME ACTIVATED: CPT

## 2019-11-10 PROCEDURE — 94761 N-INVAS EAR/PLS OXIMETRY MLT: CPT

## 2019-11-10 PROCEDURE — 99232 SBSQ HOSP IP/OBS MODERATE 35: CPT | Performed by: INTERNAL MEDICINE

## 2019-11-10 PROCEDURE — 2580000003 HC RX 258

## 2019-11-10 PROCEDURE — 6370000000 HC RX 637 (ALT 250 FOR IP): Performed by: INTERNAL MEDICINE

## 2019-11-10 PROCEDURE — 6360000002 HC RX W HCPCS: Performed by: INTERNAL MEDICINE

## 2019-11-10 PROCEDURE — 84132 ASSAY OF SERUM POTASSIUM: CPT

## 2019-11-10 PROCEDURE — C9113 INJ PANTOPRAZOLE SODIUM, VIA: HCPCS | Performed by: INTERNAL MEDICINE

## 2019-11-10 PROCEDURE — 2500000003 HC RX 250 WO HCPCS: Performed by: INTERNAL MEDICINE

## 2019-11-10 PROCEDURE — 83605 ASSAY OF LACTIC ACID: CPT

## 2019-11-10 PROCEDURE — 85610 PROTHROMBIN TIME: CPT

## 2019-11-10 PROCEDURE — 85730 THROMBOPLASTIN TIME PARTIAL: CPT

## 2019-11-10 PROCEDURE — 85027 COMPLETE CBC AUTOMATED: CPT

## 2019-11-10 PROCEDURE — 83735 ASSAY OF MAGNESIUM: CPT

## 2019-11-10 PROCEDURE — 71045 X-RAY EXAM CHEST 1 VIEW: CPT

## 2019-11-10 PROCEDURE — 2700000000 HC OXYGEN THERAPY PER DAY

## 2019-11-10 PROCEDURE — 80053 COMPREHEN METABOLIC PANEL: CPT

## 2019-11-10 RX ORDER — POTASSIUM CHLORIDE 29.8 MG/ML
20 INJECTION INTRAVENOUS PRN
Status: DISCONTINUED | OUTPATIENT
Start: 2019-11-10 | End: 2019-11-19 | Stop reason: HOSPADM

## 2019-11-10 RX ORDER — FUROSEMIDE 10 MG/ML
40 INJECTION INTRAMUSCULAR; INTRAVENOUS ONCE
Status: COMPLETED | OUTPATIENT
Start: 2019-11-10 | End: 2019-11-10

## 2019-11-10 RX ORDER — MAGNESIUM SULFATE IN WATER 40 MG/ML
2 INJECTION, SOLUTION INTRAVENOUS ONCE
Status: COMPLETED | OUTPATIENT
Start: 2019-11-10 | End: 2019-11-10

## 2019-11-10 RX ORDER — SODIUM CHLORIDE 9 MG/ML
INJECTION, SOLUTION INTRAVENOUS
Status: COMPLETED
Start: 2019-11-10 | End: 2019-11-10

## 2019-11-10 RX ADMIN — MIDODRINE HYDROCHLORIDE 10 MG: 5 TABLET ORAL at 08:41

## 2019-11-10 RX ADMIN — METOPROLOL TARTRATE 25 MG: 25 TABLET ORAL at 20:56

## 2019-11-10 RX ADMIN — MIDODRINE HYDROCHLORIDE 10 MG: 5 TABLET ORAL at 12:25

## 2019-11-10 RX ADMIN — DEXMEDETOMIDINE HYDROCHLORIDE 0.2 MCG/KG/HR: 100 INJECTION, SOLUTION INTRAVENOUS at 09:19

## 2019-11-10 RX ADMIN — SODIUM CHLORIDE 500 ML: 9 INJECTION, SOLUTION INTRAVENOUS at 22:59

## 2019-11-10 RX ADMIN — Medication 10 ML: at 21:00

## 2019-11-10 RX ADMIN — Medication 10 ML: at 21:03

## 2019-11-10 RX ADMIN — Medication 10 ML: at 20:56

## 2019-11-10 RX ADMIN — POTASSIUM CHLORIDE 20 MEQ: 400 INJECTION, SOLUTION INTRAVENOUS at 14:17

## 2019-11-10 RX ADMIN — POTASSIUM CHLORIDE 20 MEQ: 400 INJECTION, SOLUTION INTRAVENOUS at 08:39

## 2019-11-10 RX ADMIN — TICAGRELOR 90 MG: 90 TABLET ORAL at 20:55

## 2019-11-10 RX ADMIN — ASPIRIN 81 MG 81 MG: 81 TABLET ORAL at 08:41

## 2019-11-10 RX ADMIN — TICAGRELOR 90 MG: 90 TABLET ORAL at 08:41

## 2019-11-10 RX ADMIN — FUROSEMIDE 40 MG: 10 INJECTION, SOLUTION INTRAMUSCULAR; INTRAVENOUS at 08:39

## 2019-11-10 RX ADMIN — Medication 10 ML: at 08:41

## 2019-11-10 RX ADMIN — PANTOPRAZOLE SODIUM 40 MG: 40 INJECTION, POWDER, FOR SOLUTION INTRAVENOUS at 08:39

## 2019-11-10 RX ADMIN — PANTOPRAZOLE SODIUM 40 MG: 40 INJECTION, POWDER, FOR SOLUTION INTRAVENOUS at 20:55

## 2019-11-10 RX ADMIN — METOPROLOL TARTRATE 25 MG: 25 TABLET ORAL at 08:41

## 2019-11-10 RX ADMIN — MEROPENEM 1 G: 1 INJECTION, POWDER, FOR SOLUTION INTRAVENOUS at 20:55

## 2019-11-10 RX ADMIN — POTASSIUM CHLORIDE 20 MEQ: 400 INJECTION, SOLUTION INTRAVENOUS at 09:20

## 2019-11-10 RX ADMIN — LATANOPROST 1 DROP: 50 SOLUTION OPHTHALMIC at 20:57

## 2019-11-10 RX ADMIN — MEROPENEM 1 G: 1 INJECTION, POWDER, FOR SOLUTION INTRAVENOUS at 08:39

## 2019-11-10 RX ADMIN — MAGNESIUM SULFATE HEPTAHYDRATE 2 G: 40 INJECTION, SOLUTION INTRAVENOUS at 16:59

## 2019-11-10 RX ADMIN — MIDODRINE HYDROCHLORIDE 10 MG: 5 TABLET ORAL at 16:59

## 2019-11-10 RX ADMIN — HEPARIN SODIUM 25000 UNITS: 10000 INJECTION INTRAVENOUS; SUBCUTANEOUS at 18:26

## 2019-11-10 RX ADMIN — POTASSIUM CHLORIDE 20 MEQ: 400 INJECTION, SOLUTION INTRAVENOUS at 14:18

## 2019-11-10 ASSESSMENT — PAIN SCALES - WONG BAKER
WONGBAKER_NUMERICALRESPONSE: 0

## 2019-11-10 ASSESSMENT — PAIN SCALES - GENERAL
PAINLEVEL_OUTOF10: 0

## 2019-11-11 ENCOUNTER — APPOINTMENT (OUTPATIENT)
Dept: GENERAL RADIOLOGY | Age: 84
DRG: 215 | End: 2019-11-11
Payer: MEDICARE

## 2019-11-11 LAB
ACTIVATED CLOTTING TIME, LOW RANGE: 164 SEC
ACTIVATED CLOTTING TIME, LOW RANGE: 168 SEC
ACTIVATED CLOTTING TIME, LOW RANGE: 179 SEC
ACTIVATED CLOTTING TIME, LOW RANGE: 180 SEC
ACTIVATED CLOTTING TIME, LOW RANGE: 181 SEC
ACTIVATED CLOTTING TIME, LOW RANGE: 188 SEC
ACTIVATED CLOTTING TIME, LOW RANGE: 195 SEC
ACTIVATED CLOTTING TIME, LOW RANGE: 197 SEC
ALBUMIN SERPL-MCNC: 2.5 GM/DL (ref 3.4–5)
ALP BLD-CCNC: 127 IU/L (ref 40–128)
ALT SERPL-CCNC: 575 U/L (ref 10–40)
ANION GAP SERPL CALCULATED.3IONS-SCNC: 11 MMOL/L (ref 4–16)
APTT: 107.1 SECONDS (ref 25.1–37.1)
APTT: 118 SECONDS (ref 25.1–37.1)
APTT: 125.2 SECONDS (ref 25.1–37.1)
APTT: 128.9 SECONDS (ref 25.1–37.1)
AST SERPL-CCNC: 377 IU/L (ref 15–37)
BILIRUB SERPL-MCNC: 1.9 MG/DL (ref 0–1)
BUN BLDV-MCNC: 27 MG/DL (ref 6–23)
CALCIUM SERPL-MCNC: 8.4 MG/DL (ref 8.3–10.6)
CHLORIDE BLD-SCNC: 98 MMOL/L (ref 99–110)
CO2: 25 MMOL/L (ref 21–32)
CREAT SERPL-MCNC: 0.8 MG/DL (ref 0.6–1.1)
GFR AFRICAN AMERICAN: >60 ML/MIN/1.73M2
GFR NON-AFRICAN AMERICAN: >60 ML/MIN/1.73M2
GLUCOSE BLD-MCNC: 142 MG/DL (ref 70–99)
HCT VFR BLD CALC: 35.6 % (ref 37–47)
HEMOGLOBIN: 11 GM/DL (ref 12.5–16)
INR BLD: 1.28 INDEX
INR BLD: 1.31 INDEX
INR BLD: 1.34 INDEX
INR BLD: 1.42 INDEX
LACTATE: 1.2 MMOL/L (ref 0.4–2)
MAGNESIUM: 2.2 MG/DL (ref 1.8–2.4)
MCH RBC QN AUTO: 25.5 PG (ref 27–31)
MCHC RBC AUTO-ENTMCNC: 30.9 % (ref 32–36)
MCV RBC AUTO: 82.4 FL (ref 78–100)
PDW BLD-RTO: 21.8 % (ref 11.7–14.9)
PLATELET # BLD: 162 K/CU MM (ref 140–440)
POTASSIUM SERPL-SCNC: 3.4 MMOL/L (ref 3.5–5.1)
POTASSIUM SERPL-SCNC: 3.8 MMOL/L (ref 3.5–5.1)
POTASSIUM SERPL-SCNC: 4 MMOL/L (ref 3.5–5.1)
PRO-BNP: 5942 PG/ML
PROTHROMBIN TIME: 14.6 SECONDS (ref 11.7–14.5)
PROTHROMBIN TIME: 15 SECONDS (ref 11.7–14.5)
PROTHROMBIN TIME: 15.3 SECONDS (ref 11.7–14.5)
PROTHROMBIN TIME: 16.2 SECONDS (ref 11.7–14.5)
RBC # BLD: 4.32 M/CU MM (ref 4.2–5.4)
SODIUM BLD-SCNC: 134 MMOL/L (ref 135–145)
TOTAL PROTEIN: 5.7 GM/DL (ref 6.4–8.2)
TROPONIN T: 4.44 NG/ML
WBC # BLD: 10.6 K/CU MM (ref 4–10.5)

## 2019-11-11 PROCEDURE — 71045 X-RAY EXAM CHEST 1 VIEW: CPT

## 2019-11-11 PROCEDURE — 85347 COAGULATION TIME ACTIVATED: CPT

## 2019-11-11 PROCEDURE — C1894 INTRO/SHEATH, NON-LASER: HCPCS

## 2019-11-11 PROCEDURE — 6360000002 HC RX W HCPCS: Performed by: INTERNAL MEDICINE

## 2019-11-11 PROCEDURE — 83605 ASSAY OF LACTIC ACID: CPT

## 2019-11-11 PROCEDURE — 2100000000 HC CCU R&B

## 2019-11-11 PROCEDURE — 2580000003 HC RX 258

## 2019-11-11 PROCEDURE — 93503 INSERT/PLACE HEART CATHETER: CPT

## 2019-11-11 PROCEDURE — 83735 ASSAY OF MAGNESIUM: CPT

## 2019-11-11 PROCEDURE — 6370000000 HC RX 637 (ALT 250 FOR IP): Performed by: INTERNAL MEDICINE

## 2019-11-11 PROCEDURE — 2580000003 HC RX 258: Performed by: GENERAL PRACTICE

## 2019-11-11 PROCEDURE — 02HQ32Z INSERTION OF MONITORING DEVICE INTO RIGHT PULMONARY ARTERY, PERCUTANEOUS APPROACH: ICD-10-PCS | Performed by: INTERNAL MEDICINE

## 2019-11-11 PROCEDURE — 83880 ASSAY OF NATRIURETIC PEPTIDE: CPT

## 2019-11-11 PROCEDURE — 2580000003 HC RX 258: Performed by: INTERNAL MEDICINE

## 2019-11-11 PROCEDURE — C1751 CATH, INF, PER/CENT/MIDLINE: HCPCS

## 2019-11-11 PROCEDURE — 6360000002 HC RX W HCPCS: Performed by: GENERAL PRACTICE

## 2019-11-11 PROCEDURE — 93308 TTE F-UP OR LMTD: CPT

## 2019-11-11 PROCEDURE — 84484 ASSAY OF TROPONIN QUANT: CPT

## 2019-11-11 PROCEDURE — 80053 COMPREHEN METABOLIC PANEL: CPT

## 2019-11-11 PROCEDURE — 85027 COMPLETE CBC AUTOMATED: CPT

## 2019-11-11 PROCEDURE — C9113 INJ PANTOPRAZOLE SODIUM, VIA: HCPCS | Performed by: INTERNAL MEDICINE

## 2019-11-11 PROCEDURE — 85610 PROTHROMBIN TIME: CPT

## 2019-11-11 PROCEDURE — 85730 THROMBOPLASTIN TIME PARTIAL: CPT

## 2019-11-11 PROCEDURE — 94761 N-INVAS EAR/PLS OXIMETRY MLT: CPT

## 2019-11-11 PROCEDURE — 84132 ASSAY OF SERUM POTASSIUM: CPT

## 2019-11-11 PROCEDURE — 99222 1ST HOSP IP/OBS MODERATE 55: CPT | Performed by: OBSTETRICS & GYNECOLOGY

## 2019-11-11 PROCEDURE — 2709999900 HC NON-CHARGEABLE SUPPLY

## 2019-11-11 PROCEDURE — 2700000000 HC OXYGEN THERAPY PER DAY

## 2019-11-11 RX ORDER — SODIUM CHLORIDE 9 MG/ML
INJECTION, SOLUTION INTRAVENOUS
Status: COMPLETED
Start: 2019-11-11 | End: 2019-11-11

## 2019-11-11 RX ORDER — AMIODARONE HYDROCHLORIDE 200 MG/1
200 TABLET ORAL DAILY
Status: DISCONTINUED | OUTPATIENT
Start: 2019-11-11 | End: 2019-11-19 | Stop reason: HOSPADM

## 2019-11-11 RX ORDER — LISINOPRIL 2.5 MG/1
2.5 TABLET ORAL NIGHTLY
Status: DISCONTINUED | OUTPATIENT
Start: 2019-11-11 | End: 2019-11-19 | Stop reason: HOSPADM

## 2019-11-11 RX ORDER — FUROSEMIDE 10 MG/ML
40 INJECTION INTRAMUSCULAR; INTRAVENOUS ONCE
Status: COMPLETED | OUTPATIENT
Start: 2019-11-11 | End: 2019-11-11

## 2019-11-11 RX ORDER — DIGOXIN 125 MCG
125 TABLET ORAL DAILY
Status: DISCONTINUED | OUTPATIENT
Start: 2019-11-11 | End: 2019-11-19 | Stop reason: HOSPADM

## 2019-11-11 RX ADMIN — PANTOPRAZOLE SODIUM 40 MG: 40 INJECTION, POWDER, FOR SOLUTION INTRAVENOUS at 08:44

## 2019-11-11 RX ADMIN — LATANOPROST 1 DROP: 50 SOLUTION OPHTHALMIC at 21:44

## 2019-11-11 RX ADMIN — SODIUM CHLORIDE 500 ML: 9 INJECTION, SOLUTION INTRAVENOUS at 12:03

## 2019-11-11 RX ADMIN — MIDODRINE HYDROCHLORIDE 10 MG: 5 TABLET ORAL at 12:03

## 2019-11-11 RX ADMIN — LISINOPRIL 2.5 MG: 2.5 TABLET ORAL at 20:10

## 2019-11-11 RX ADMIN — SODIUM CHLORIDE 500 ML: 9 INJECTION, SOLUTION INTRAVENOUS at 12:04

## 2019-11-11 RX ADMIN — POTASSIUM CHLORIDE 20 MEQ: 400 INJECTION, SOLUTION INTRAVENOUS at 14:14

## 2019-11-11 RX ADMIN — AMIODARONE HYDROCHLORIDE 200 MG: 200 TABLET ORAL at 10:27

## 2019-11-11 RX ADMIN — ASPIRIN 81 MG 81 MG: 81 TABLET ORAL at 08:45

## 2019-11-11 RX ADMIN — Medication 10 ML: at 08:46

## 2019-11-11 RX ADMIN — MEROPENEM 1 G: 1 INJECTION, POWDER, FOR SOLUTION INTRAVENOUS at 08:45

## 2019-11-11 RX ADMIN — DIGOXIN 125 MCG: 125 TABLET ORAL at 10:27

## 2019-11-11 RX ADMIN — METOPROLOL TARTRATE 25 MG: 25 TABLET ORAL at 08:45

## 2019-11-11 RX ADMIN — Medication 10 ML: at 21:08

## 2019-11-11 RX ADMIN — POTASSIUM CHLORIDE 20 MEQ: 400 INJECTION, SOLUTION INTRAVENOUS at 16:20

## 2019-11-11 RX ADMIN — MEROPENEM 1 G: 1 INJECTION, POWDER, FOR SOLUTION INTRAVENOUS at 18:35

## 2019-11-11 RX ADMIN — METOPROLOL TARTRATE 25 MG: 25 TABLET ORAL at 21:08

## 2019-11-11 RX ADMIN — MIDODRINE HYDROCHLORIDE 10 MG: 5 TABLET ORAL at 16:53

## 2019-11-11 RX ADMIN — PANTOPRAZOLE SODIUM 40 MG: 40 INJECTION, POWDER, FOR SOLUTION INTRAVENOUS at 21:08

## 2019-11-11 RX ADMIN — TICAGRELOR 90 MG: 90 TABLET ORAL at 21:08

## 2019-11-11 RX ADMIN — FUROSEMIDE 40 MG: 10 INJECTION, SOLUTION INTRAMUSCULAR; INTRAVENOUS at 14:14

## 2019-11-11 RX ADMIN — HEPARIN SODIUM: 5000 INJECTION, SOLUTION INTRAVENOUS; SUBCUTANEOUS at 18:24

## 2019-11-11 RX ADMIN — TICAGRELOR 90 MG: 90 TABLET ORAL at 08:44

## 2019-11-11 RX ADMIN — MIDODRINE HYDROCHLORIDE 10 MG: 5 TABLET ORAL at 08:45

## 2019-11-11 RX ADMIN — FUROSEMIDE 40 MG: 10 INJECTION, SOLUTION INTRAMUSCULAR; INTRAVENOUS at 07:20

## 2019-11-11 ASSESSMENT — PAIN SCALES - GENERAL
PAINLEVEL_OUTOF10: 0

## 2019-11-12 ENCOUNTER — APPOINTMENT (OUTPATIENT)
Dept: GENERAL RADIOLOGY | Age: 84
DRG: 215 | End: 2019-11-12
Payer: MEDICARE

## 2019-11-12 LAB
ACTIVATED CLOTTING TIME, LOW RANGE: 143 SEC
ACTIVATED CLOTTING TIME, LOW RANGE: 157 SEC
ACTIVATED CLOTTING TIME, LOW RANGE: 158 SEC
ACTIVATED CLOTTING TIME, LOW RANGE: 159 SEC
ACTIVATED CLOTTING TIME, LOW RANGE: 167 SEC
ACTIVATED CLOTTING TIME, LOW RANGE: 182 SEC
ACTIVATED CLOTTING TIME, LOW RANGE: 182 SEC
ACTIVATED CLOTTING TIME, LOW RANGE: 191 SEC
ALBUMIN SERPL-MCNC: 2.5 GM/DL (ref 3.4–5)
ALP BLD-CCNC: 116 IU/L (ref 40–128)
ALT SERPL-CCNC: 376 U/L (ref 10–40)
ANION GAP SERPL CALCULATED.3IONS-SCNC: 10 MMOL/L (ref 4–16)
APTT: 105 SECONDS (ref 25.1–37.1)
APTT: 52.1 SECONDS (ref 25.1–37.1)
AST SERPL-CCNC: 196 IU/L (ref 15–37)
BILIRUB SERPL-MCNC: 1.7 MG/DL (ref 0–1)
BUN BLDV-MCNC: 24 MG/DL (ref 6–23)
CALCIUM SERPL-MCNC: 8.5 MG/DL (ref 8.3–10.6)
CHLORIDE BLD-SCNC: 98 MMOL/L (ref 99–110)
CO2: 29 MMOL/L (ref 21–32)
CREAT SERPL-MCNC: 0.8 MG/DL (ref 0.6–1.1)
GFR AFRICAN AMERICAN: >60 ML/MIN/1.73M2
GFR NON-AFRICAN AMERICAN: >60 ML/MIN/1.73M2
GLUCOSE BLD-MCNC: 132 MG/DL (ref 70–99)
HCT VFR BLD CALC: 35.1 % (ref 37–47)
HEMOGLOBIN: 10.7 GM/DL (ref 12.5–16)
INR BLD: 1.3 INDEX
INR BLD: 1.33 INDEX
MCH RBC QN AUTO: 25.8 PG (ref 27–31)
MCHC RBC AUTO-ENTMCNC: 30.5 % (ref 32–36)
MCV RBC AUTO: 84.6 FL (ref 78–100)
PDW BLD-RTO: 22.5 % (ref 11.7–14.9)
PLATELET # BLD: 127 K/CU MM (ref 140–440)
POTASSIUM SERPL-SCNC: 3.6 MMOL/L (ref 3.5–5.1)
PROTHROMBIN TIME: 15.1 SECONDS (ref 11.7–14.5)
PROTHROMBIN TIME: 15.2 SECONDS (ref 11.7–14.5)
RBC # BLD: 4.15 M/CU MM (ref 4.2–5.4)
SODIUM BLD-SCNC: 137 MMOL/L (ref 135–145)
TOTAL PROTEIN: 5.8 GM/DL (ref 6.4–8.2)
WBC # BLD: 9.6 K/CU MM (ref 4–10.5)

## 2019-11-12 PROCEDURE — 6370000000 HC RX 637 (ALT 250 FOR IP): Performed by: INTERNAL MEDICINE

## 2019-11-12 PROCEDURE — C9113 INJ PANTOPRAZOLE SODIUM, VIA: HCPCS | Performed by: INTERNAL MEDICINE

## 2019-11-12 PROCEDURE — 6360000002 HC RX W HCPCS: Performed by: INTERNAL MEDICINE

## 2019-11-12 PROCEDURE — 2580000003 HC RX 258: Performed by: INTERNAL MEDICINE

## 2019-11-12 PROCEDURE — 2580000003 HC RX 258: Performed by: GENERAL PRACTICE

## 2019-11-12 PROCEDURE — 02PA3RZ REMOVAL OF SHORT-TERM EXTERNAL HEART ASSIST SYSTEM FROM HEART, PERCUTANEOUS APPROACH: ICD-10-PCS | Performed by: INTERNAL MEDICINE

## 2019-11-12 PROCEDURE — 99231 SBSQ HOSP IP/OBS SF/LOW 25: CPT | Performed by: OBSTETRICS & GYNECOLOGY

## 2019-11-12 PROCEDURE — 6360000002 HC RX W HCPCS: Performed by: GENERAL PRACTICE

## 2019-11-12 PROCEDURE — 2100000000 HC CCU R&B

## 2019-11-12 PROCEDURE — 93308 TTE F-UP OR LMTD: CPT

## 2019-11-12 PROCEDURE — 2709999900 HC NON-CHARGEABLE SUPPLY

## 2019-11-12 PROCEDURE — 80053 COMPREHEN METABOLIC PANEL: CPT

## 2019-11-12 PROCEDURE — 71045 X-RAY EXAM CHEST 1 VIEW: CPT

## 2019-11-12 PROCEDURE — 85610 PROTHROMBIN TIME: CPT

## 2019-11-12 PROCEDURE — 2700000000 HC OXYGEN THERAPY PER DAY

## 2019-11-12 PROCEDURE — 85347 COAGULATION TIME ACTIVATED: CPT

## 2019-11-12 PROCEDURE — 2580000003 HC RX 258: Performed by: PHYSICIAN ASSISTANT

## 2019-11-12 PROCEDURE — 94761 N-INVAS EAR/PLS OXIMETRY MLT: CPT

## 2019-11-12 PROCEDURE — 74018 RADEX ABDOMEN 1 VIEW: CPT

## 2019-11-12 PROCEDURE — 85730 THROMBOPLASTIN TIME PARTIAL: CPT

## 2019-11-12 PROCEDURE — 85027 COMPLETE CBC AUTOMATED: CPT

## 2019-11-12 RX ORDER — DEXTROSE MONOHYDRATE 50 MG/ML
INJECTION, SOLUTION INTRAVENOUS CONTINUOUS
Status: DISCONTINUED | OUTPATIENT
Start: 2019-11-12 | End: 2019-11-12

## 2019-11-12 RX ORDER — FUROSEMIDE 20 MG/1
20 TABLET ORAL 2 TIMES DAILY
Status: DISCONTINUED | OUTPATIENT
Start: 2019-11-12 | End: 2019-11-13

## 2019-11-12 RX ADMIN — ASPIRIN 81 MG 81 MG: 81 TABLET ORAL at 09:28

## 2019-11-12 RX ADMIN — MEROPENEM 1 G: 1 INJECTION, POWDER, FOR SOLUTION INTRAVENOUS at 12:00

## 2019-11-12 RX ADMIN — METOPROLOL TARTRATE 25 MG: 25 TABLET ORAL at 09:28

## 2019-11-12 RX ADMIN — AMIODARONE HYDROCHLORIDE 200 MG: 200 TABLET ORAL at 09:28

## 2019-11-12 RX ADMIN — MIDODRINE HYDROCHLORIDE 10 MG: 5 TABLET ORAL at 17:03

## 2019-11-12 RX ADMIN — HEPARIN SODIUM 25000 UNITS: 10000 INJECTION INTRAVENOUS; SUBCUTANEOUS at 00:59

## 2019-11-12 RX ADMIN — TICAGRELOR 90 MG: 90 TABLET ORAL at 21:52

## 2019-11-12 RX ADMIN — MIDODRINE HYDROCHLORIDE 10 MG: 5 TABLET ORAL at 12:00

## 2019-11-12 RX ADMIN — DEXTROSE MONOHYDRATE: 50 INJECTION, SOLUTION INTRAVENOUS at 09:05

## 2019-11-12 RX ADMIN — PANTOPRAZOLE SODIUM 40 MG: 40 INJECTION, POWDER, FOR SOLUTION INTRAVENOUS at 09:28

## 2019-11-12 RX ADMIN — MEROPENEM 1 G: 1 INJECTION, POWDER, FOR SOLUTION INTRAVENOUS at 18:26

## 2019-11-12 RX ADMIN — METOPROLOL TARTRATE 25 MG: 25 TABLET ORAL at 21:52

## 2019-11-12 RX ADMIN — LATANOPROST 1 DROP: 50 SOLUTION OPHTHALMIC at 21:57

## 2019-11-12 RX ADMIN — LISINOPRIL 2.5 MG: 2.5 TABLET ORAL at 21:52

## 2019-11-12 RX ADMIN — DIGOXIN 125 MCG: 125 TABLET ORAL at 09:28

## 2019-11-12 RX ADMIN — Medication 10 ML: at 09:29

## 2019-11-12 RX ADMIN — PANTOPRAZOLE SODIUM 40 MG: 40 INJECTION, POWDER, FOR SOLUTION INTRAVENOUS at 21:52

## 2019-11-12 RX ADMIN — SODIUM CHLORIDE 250 ML: 9 INJECTION, SOLUTION INTRAVENOUS at 16:15

## 2019-11-12 RX ADMIN — MIDODRINE HYDROCHLORIDE 10 MG: 5 TABLET ORAL at 09:27

## 2019-11-12 RX ADMIN — FUROSEMIDE 20 MG: 20 TABLET ORAL at 18:28

## 2019-11-12 RX ADMIN — MEROPENEM 1 G: 1 INJECTION, POWDER, FOR SOLUTION INTRAVENOUS at 02:45

## 2019-11-12 RX ADMIN — Medication 10 ML: at 21:53

## 2019-11-12 RX ADMIN — TICAGRELOR 90 MG: 90 TABLET ORAL at 09:28

## 2019-11-12 RX ADMIN — HEPARIN SODIUM: 5000 INJECTION, SOLUTION INTRAVENOUS; SUBCUTANEOUS at 08:22

## 2019-11-12 ASSESSMENT — PAIN SCALES - GENERAL
PAINLEVEL_OUTOF10: 0

## 2019-11-13 ENCOUNTER — APPOINTMENT (OUTPATIENT)
Dept: GENERAL RADIOLOGY | Age: 84
DRG: 215 | End: 2019-11-13
Payer: MEDICARE

## 2019-11-13 LAB
ALBUMIN SERPL-MCNC: 2.5 GM/DL (ref 3.4–5)
ALP BLD-CCNC: 111 IU/L (ref 40–128)
ALT SERPL-CCNC: 255 U/L (ref 10–40)
ANION GAP SERPL CALCULATED.3IONS-SCNC: 11 MMOL/L (ref 4–16)
AST SERPL-CCNC: 111 IU/L (ref 15–37)
BILIRUB SERPL-MCNC: 1.2 MG/DL (ref 0–1)
BUN BLDV-MCNC: 22 MG/DL (ref 6–23)
CALCIUM SERPL-MCNC: 8.8 MG/DL (ref 8.3–10.6)
CHLORIDE BLD-SCNC: 99 MMOL/L (ref 99–110)
CO2: 26 MMOL/L (ref 21–32)
CREAT SERPL-MCNC: 0.8 MG/DL (ref 0.6–1.1)
CULTURE: NORMAL
CULTURE: NORMAL
GFR AFRICAN AMERICAN: >60 ML/MIN/1.73M2
GFR NON-AFRICAN AMERICAN: >60 ML/MIN/1.73M2
GLUCOSE BLD-MCNC: 116 MG/DL (ref 70–99)
HCT VFR BLD CALC: 35.3 % (ref 37–47)
HEMOGLOBIN: 10.7 GM/DL (ref 12.5–16)
Lab: NORMAL
Lab: NORMAL
MCH RBC QN AUTO: 25.8 PG (ref 27–31)
MCHC RBC AUTO-ENTMCNC: 30.3 % (ref 32–36)
MCV RBC AUTO: 85.1 FL (ref 78–100)
PDW BLD-RTO: 22.9 % (ref 11.7–14.9)
PLATELET # BLD: 111 K/CU MM (ref 140–440)
POTASSIUM SERPL-SCNC: 4.1 MMOL/L (ref 3.5–5.1)
PRO-BNP: ABNORMAL PG/ML
RBC # BLD: 4.15 M/CU MM (ref 4.2–5.4)
SODIUM BLD-SCNC: 136 MMOL/L (ref 135–145)
SPECIMEN: NORMAL
SPECIMEN: NORMAL
TOTAL PROTEIN: 5.8 GM/DL (ref 6.4–8.2)
WBC # BLD: 10 K/CU MM (ref 4–10.5)

## 2019-11-13 PROCEDURE — 97167 OT EVAL HIGH COMPLEX 60 MIN: CPT

## 2019-11-13 PROCEDURE — 97116 GAIT TRAINING THERAPY: CPT

## 2019-11-13 PROCEDURE — 97530 THERAPEUTIC ACTIVITIES: CPT

## 2019-11-13 PROCEDURE — 97162 PT EVAL MOD COMPLEX 30 MIN: CPT

## 2019-11-13 PROCEDURE — 85027 COMPLETE CBC AUTOMATED: CPT

## 2019-11-13 PROCEDURE — 2100000000 HC CCU R&B

## 2019-11-13 PROCEDURE — 71045 X-RAY EXAM CHEST 1 VIEW: CPT

## 2019-11-13 PROCEDURE — 6360000002 HC RX W HCPCS: Performed by: GENERAL PRACTICE

## 2019-11-13 PROCEDURE — 83880 ASSAY OF NATRIURETIC PEPTIDE: CPT

## 2019-11-13 PROCEDURE — 6370000000 HC RX 637 (ALT 250 FOR IP): Performed by: INTERNAL MEDICINE

## 2019-11-13 PROCEDURE — 2580000003 HC RX 258

## 2019-11-13 PROCEDURE — C9113 INJ PANTOPRAZOLE SODIUM, VIA: HCPCS | Performed by: INTERNAL MEDICINE

## 2019-11-13 PROCEDURE — 36415 COLL VENOUS BLD VENIPUNCTURE: CPT

## 2019-11-13 PROCEDURE — 92610 EVALUATE SWALLOWING FUNCTION: CPT

## 2019-11-13 PROCEDURE — 2580000003 HC RX 258: Performed by: INTERNAL MEDICINE

## 2019-11-13 PROCEDURE — 6360000002 HC RX W HCPCS: Performed by: INTERNAL MEDICINE

## 2019-11-13 PROCEDURE — 97535 SELF CARE MNGMENT TRAINING: CPT

## 2019-11-13 PROCEDURE — 80053 COMPREHEN METABOLIC PANEL: CPT

## 2019-11-13 PROCEDURE — 2580000003 HC RX 258: Performed by: GENERAL PRACTICE

## 2019-11-13 RX ORDER — POTASSIUM CHLORIDE 20 MEQ/1
10 TABLET, EXTENDED RELEASE ORAL 2 TIMES DAILY
Status: DISCONTINUED | OUTPATIENT
Start: 2019-11-13 | End: 2019-11-19 | Stop reason: HOSPADM

## 2019-11-13 RX ORDER — FUROSEMIDE 10 MG/ML
40 INJECTION INTRAMUSCULAR; INTRAVENOUS 2 TIMES DAILY
Status: DISCONTINUED | OUTPATIENT
Start: 2019-11-13 | End: 2019-11-14

## 2019-11-13 RX ORDER — SODIUM CHLORIDE 9 MG/ML
INJECTION, SOLUTION INTRAVENOUS
Status: COMPLETED
Start: 2019-11-13 | End: 2019-11-13

## 2019-11-13 RX ADMIN — METOPROLOL TARTRATE 25 MG: 25 TABLET ORAL at 21:55

## 2019-11-13 RX ADMIN — AMIODARONE HYDROCHLORIDE 200 MG: 200 TABLET ORAL at 08:47

## 2019-11-13 RX ADMIN — MIDODRINE HYDROCHLORIDE 10 MG: 5 TABLET ORAL at 13:15

## 2019-11-13 RX ADMIN — PANTOPRAZOLE SODIUM 40 MG: 40 INJECTION, POWDER, FOR SOLUTION INTRAVENOUS at 08:46

## 2019-11-13 RX ADMIN — LISINOPRIL 2.5 MG: 2.5 TABLET ORAL at 21:55

## 2019-11-13 RX ADMIN — TICAGRELOR 90 MG: 90 TABLET ORAL at 08:47

## 2019-11-13 RX ADMIN — MIDODRINE HYDROCHLORIDE 10 MG: 5 TABLET ORAL at 17:54

## 2019-11-13 RX ADMIN — POTASSIUM CHLORIDE 10 MEQ: 20 TABLET, EXTENDED RELEASE ORAL at 21:55

## 2019-11-13 RX ADMIN — FUROSEMIDE 40 MG: 10 INJECTION, SOLUTION INTRAMUSCULAR; INTRAVENOUS at 17:54

## 2019-11-13 RX ADMIN — ASPIRIN 81 MG 81 MG: 81 TABLET ORAL at 08:47

## 2019-11-13 RX ADMIN — Medication 10 ML: at 08:46

## 2019-11-13 RX ADMIN — TICAGRELOR 90 MG: 90 TABLET ORAL at 21:55

## 2019-11-13 RX ADMIN — MEROPENEM 1 G: 1 INJECTION, POWDER, FOR SOLUTION INTRAVENOUS at 04:37

## 2019-11-13 RX ADMIN — POTASSIUM CHLORIDE 10 MEQ: 20 TABLET, EXTENDED RELEASE ORAL at 08:47

## 2019-11-13 RX ADMIN — MIDODRINE HYDROCHLORIDE 10 MG: 5 TABLET ORAL at 08:47

## 2019-11-13 RX ADMIN — APIXABAN 2.5 MG: 2.5 TABLET, FILM COATED ORAL at 13:15

## 2019-11-13 RX ADMIN — DIGOXIN 125 MCG: 125 TABLET ORAL at 08:47

## 2019-11-13 RX ADMIN — MEROPENEM 1 G: 1 INJECTION, POWDER, FOR SOLUTION INTRAVENOUS at 18:37

## 2019-11-13 RX ADMIN — Medication 10 ML: at 21:57

## 2019-11-13 RX ADMIN — SODIUM CHLORIDE 500 ML: 9 INJECTION, SOLUTION INTRAVENOUS at 08:00

## 2019-11-13 RX ADMIN — FUROSEMIDE 40 MG: 10 INJECTION, SOLUTION INTRAMUSCULAR; INTRAVENOUS at 08:46

## 2019-11-13 RX ADMIN — MEROPENEM 1 G: 1 INJECTION, POWDER, FOR SOLUTION INTRAVENOUS at 11:07

## 2019-11-13 RX ADMIN — PANTOPRAZOLE SODIUM 40 MG: 40 INJECTION, POWDER, FOR SOLUTION INTRAVENOUS at 21:56

## 2019-11-13 RX ADMIN — METOPROLOL TARTRATE 25 MG: 25 TABLET ORAL at 08:47

## 2019-11-13 RX ADMIN — APIXABAN 2.5 MG: 2.5 TABLET, FILM COATED ORAL at 21:55

## 2019-11-13 ASSESSMENT — PAIN SCALES - GENERAL
PAINLEVEL_OUTOF10: 0

## 2019-11-14 PROCEDURE — 6370000000 HC RX 637 (ALT 250 FOR IP): Performed by: INTERNAL MEDICINE

## 2019-11-14 PROCEDURE — 1200000000 HC SEMI PRIVATE

## 2019-11-14 PROCEDURE — 97535 SELF CARE MNGMENT TRAINING: CPT

## 2019-11-14 PROCEDURE — 2580000003 HC RX 258: Performed by: GENERAL PRACTICE

## 2019-11-14 PROCEDURE — 2580000003 HC RX 258: Performed by: INTERNAL MEDICINE

## 2019-11-14 PROCEDURE — C9113 INJ PANTOPRAZOLE SODIUM, VIA: HCPCS | Performed by: INTERNAL MEDICINE

## 2019-11-14 PROCEDURE — 97530 THERAPEUTIC ACTIVITIES: CPT

## 2019-11-14 PROCEDURE — 94761 N-INVAS EAR/PLS OXIMETRY MLT: CPT

## 2019-11-14 PROCEDURE — 6360000002 HC RX W HCPCS: Performed by: GENERAL PRACTICE

## 2019-11-14 PROCEDURE — 2700000000 HC OXYGEN THERAPY PER DAY

## 2019-11-14 PROCEDURE — 97110 THERAPEUTIC EXERCISES: CPT

## 2019-11-14 PROCEDURE — 92526 ORAL FUNCTION THERAPY: CPT

## 2019-11-14 PROCEDURE — 6360000002 HC RX W HCPCS: Performed by: INTERNAL MEDICINE

## 2019-11-14 RX ORDER — DIGOXIN 0.25 MG/ML
250 INJECTION INTRAMUSCULAR; INTRAVENOUS ONCE
Status: COMPLETED | OUTPATIENT
Start: 2019-11-14 | End: 2019-11-14

## 2019-11-14 RX ORDER — FUROSEMIDE 40 MG/1
40 TABLET ORAL DAILY
Status: DISCONTINUED | OUTPATIENT
Start: 2019-11-15 | End: 2019-11-19 | Stop reason: HOSPADM

## 2019-11-14 RX ORDER — SODIUM CHLORIDE 0.9 % (FLUSH) 0.9 %
10 SYRINGE (ML) INJECTION EVERY 12 HOURS SCHEDULED
Status: DISCONTINUED | OUTPATIENT
Start: 2019-11-14 | End: 2019-11-19 | Stop reason: HOSPADM

## 2019-11-14 RX ORDER — SODIUM CHLORIDE 0.9 % (FLUSH) 0.9 %
10 SYRINGE (ML) INJECTION PRN
Status: DISCONTINUED | OUTPATIENT
Start: 2019-11-14 | End: 2019-11-19 | Stop reason: HOSPADM

## 2019-11-14 RX ORDER — LIDOCAINE HYDROCHLORIDE 10 MG/ML
5 INJECTION, SOLUTION EPIDURAL; INFILTRATION; INTRACAUDAL; PERINEURAL ONCE
Status: COMPLETED | OUTPATIENT
Start: 2019-11-14 | End: 2019-11-15

## 2019-11-14 RX ORDER — PANTOPRAZOLE SODIUM 40 MG/1
40 TABLET, DELAYED RELEASE ORAL
Status: DISCONTINUED | OUTPATIENT
Start: 2019-11-14 | End: 2019-11-19 | Stop reason: HOSPADM

## 2019-11-14 RX ADMIN — POTASSIUM CHLORIDE 10 MEQ: 20 TABLET, EXTENDED RELEASE ORAL at 08:55

## 2019-11-14 RX ADMIN — FUROSEMIDE 40 MG: 10 INJECTION, SOLUTION INTRAMUSCULAR; INTRAVENOUS at 08:53

## 2019-11-14 RX ADMIN — APIXABAN 2.5 MG: 2.5 TABLET, FILM COATED ORAL at 08:58

## 2019-11-14 RX ADMIN — PANTOPRAZOLE SODIUM 40 MG: 40 INJECTION, POWDER, FOR SOLUTION INTRAVENOUS at 08:53

## 2019-11-14 RX ADMIN — METOPROLOL TARTRATE 25 MG: 25 TABLET ORAL at 23:23

## 2019-11-14 RX ADMIN — AMIODARONE HYDROCHLORIDE 200 MG: 200 TABLET ORAL at 08:55

## 2019-11-14 RX ADMIN — MEROPENEM 1 G: 1 INJECTION, POWDER, FOR SOLUTION INTRAVENOUS at 11:53

## 2019-11-14 RX ADMIN — APIXABAN 2.5 MG: 2.5 TABLET, FILM COATED ORAL at 23:24

## 2019-11-14 RX ADMIN — ASPIRIN 81 MG 81 MG: 81 TABLET ORAL at 08:55

## 2019-11-14 RX ADMIN — DIGOXIN 250 MCG: 0.25 INJECTION INTRAMUSCULAR; INTRAVENOUS at 12:54

## 2019-11-14 RX ADMIN — POTASSIUM CHLORIDE 10 MEQ: 20 TABLET, EXTENDED RELEASE ORAL at 23:23

## 2019-11-14 RX ADMIN — MIDODRINE HYDROCHLORIDE 10 MG: 5 TABLET ORAL at 11:49

## 2019-11-14 RX ADMIN — MIDODRINE HYDROCHLORIDE 10 MG: 5 TABLET ORAL at 08:54

## 2019-11-14 RX ADMIN — TICAGRELOR 90 MG: 90 TABLET ORAL at 08:58

## 2019-11-14 RX ADMIN — DIGOXIN 125 MCG: 125 TABLET ORAL at 08:55

## 2019-11-14 RX ADMIN — TICAGRELOR 90 MG: 90 TABLET ORAL at 23:24

## 2019-11-14 RX ADMIN — MIDODRINE HYDROCHLORIDE 10 MG: 5 TABLET ORAL at 18:35

## 2019-11-14 RX ADMIN — LISINOPRIL 2.5 MG: 2.5 TABLET ORAL at 23:23

## 2019-11-14 RX ADMIN — Medication 10 ML: at 08:53

## 2019-11-14 RX ADMIN — METOPROLOL TARTRATE 25 MG: 25 TABLET ORAL at 11:52

## 2019-11-14 ASSESSMENT — PAIN SCALES - GENERAL
PAINLEVEL_OUTOF10: 0

## 2019-11-15 ENCOUNTER — APPOINTMENT (OUTPATIENT)
Dept: GENERAL RADIOLOGY | Age: 84
DRG: 215 | End: 2019-11-15
Payer: MEDICARE

## 2019-11-15 LAB
ALBUMIN SERPL-MCNC: 2.5 GM/DL (ref 3.4–5)
ALP BLD-CCNC: 100 IU/L (ref 40–128)
ALT SERPL-CCNC: 120 U/L (ref 10–40)
ANION GAP SERPL CALCULATED.3IONS-SCNC: 9 MMOL/L (ref 4–16)
AST SERPL-CCNC: 47 IU/L (ref 15–37)
BILIRUB SERPL-MCNC: 1.5 MG/DL (ref 0–1)
BUN BLDV-MCNC: 21 MG/DL (ref 6–23)
CALCIUM SERPL-MCNC: 9 MG/DL (ref 8.3–10.6)
CHLORIDE BLD-SCNC: 95 MMOL/L (ref 99–110)
CO2: 30 MMOL/L (ref 21–32)
CREAT SERPL-MCNC: 0.9 MG/DL (ref 0.6–1.1)
GFR AFRICAN AMERICAN: >60 ML/MIN/1.73M2
GFR NON-AFRICAN AMERICAN: 60 ML/MIN/1.73M2
GLUCOSE BLD-MCNC: 95 MG/DL (ref 70–99)
HCT VFR BLD CALC: 34.8 % (ref 37–47)
HEMOGLOBIN: 10.5 GM/DL (ref 12.5–16)
MCH RBC QN AUTO: 25.9 PG (ref 27–31)
MCHC RBC AUTO-ENTMCNC: 30.2 % (ref 32–36)
MCV RBC AUTO: 85.9 FL (ref 78–100)
PDW BLD-RTO: 22.8 % (ref 11.7–14.9)
PLATELET # BLD: 135 K/CU MM (ref 140–440)
PMV BLD AUTO: 12.3 FL (ref 7.5–11.1)
POTASSIUM SERPL-SCNC: 3.9 MMOL/L (ref 3.5–5.1)
RBC # BLD: 4.05 M/CU MM (ref 4.2–5.4)
SODIUM BLD-SCNC: 134 MMOL/L (ref 135–145)
TOTAL PROTEIN: 6 GM/DL (ref 6.4–8.2)
WBC # BLD: 8.3 K/CU MM (ref 4–10.5)

## 2019-11-15 PROCEDURE — 6370000000 HC RX 637 (ALT 250 FOR IP): Performed by: INTERNAL MEDICINE

## 2019-11-15 PROCEDURE — 2580000003 HC RX 258: Performed by: INTERNAL MEDICINE

## 2019-11-15 PROCEDURE — 1200000000 HC SEMI PRIVATE

## 2019-11-15 PROCEDURE — 94761 N-INVAS EAR/PLS OXIMETRY MLT: CPT

## 2019-11-15 PROCEDURE — 2580000003 HC RX 258: Performed by: GENERAL PRACTICE

## 2019-11-15 PROCEDURE — 02HV33Z INSERTION OF INFUSION DEVICE INTO SUPERIOR VENA CAVA, PERCUTANEOUS APPROACH: ICD-10-PCS | Performed by: INTERNAL MEDICINE

## 2019-11-15 PROCEDURE — 80053 COMPREHEN METABOLIC PANEL: CPT

## 2019-11-15 PROCEDURE — 36569 INSJ PICC 5 YR+ W/O IMAGING: CPT

## 2019-11-15 PROCEDURE — 74230 X-RAY XM SWLNG FUNCJ C+: CPT

## 2019-11-15 PROCEDURE — 85027 COMPLETE CBC AUTOMATED: CPT

## 2019-11-15 PROCEDURE — 92611 MOTION FLUOROSCOPY/SWALLOW: CPT

## 2019-11-15 PROCEDURE — 76937 US GUIDE VASCULAR ACCESS: CPT

## 2019-11-15 PROCEDURE — 6360000002 HC RX W HCPCS: Performed by: GENERAL PRACTICE

## 2019-11-15 PROCEDURE — 2580000003 HC RX 258: Performed by: PHYSICIAN ASSISTANT

## 2019-11-15 PROCEDURE — C1751 CATH, INF, PER/CENT/MIDLINE: HCPCS

## 2019-11-15 RX ADMIN — TICAGRELOR 90 MG: 90 TABLET ORAL at 11:15

## 2019-11-15 RX ADMIN — PANTOPRAZOLE SODIUM 40 MG: 40 TABLET, DELAYED RELEASE ORAL at 06:55

## 2019-11-15 RX ADMIN — AMIODARONE HYDROCHLORIDE 200 MG: 200 TABLET ORAL at 11:15

## 2019-11-15 RX ADMIN — MEROPENEM 1 G: 1 INJECTION, POWDER, FOR SOLUTION INTRAVENOUS at 11:14

## 2019-11-15 RX ADMIN — APIXABAN 2.5 MG: 2.5 TABLET, FILM COATED ORAL at 20:34

## 2019-11-15 RX ADMIN — LATANOPROST 1 DROP: 50 SOLUTION OPHTHALMIC at 23:13

## 2019-11-15 RX ADMIN — MEROPENEM 1 G: 1 INJECTION, POWDER, FOR SOLUTION INTRAVENOUS at 17:59

## 2019-11-15 RX ADMIN — LIDOCAINE HYDROCHLORIDE 5 ML: 10 INJECTION, SOLUTION EPIDURAL; INFILTRATION; INTRACAUDAL; PERINEURAL at 09:28

## 2019-11-15 RX ADMIN — POTASSIUM CHLORIDE 10 MEQ: 20 TABLET, EXTENDED RELEASE ORAL at 11:15

## 2019-11-15 RX ADMIN — Medication 10 ML: at 20:39

## 2019-11-15 RX ADMIN — Medication 10 ML: at 11:17

## 2019-11-15 RX ADMIN — APIXABAN 2.5 MG: 2.5 TABLET, FILM COATED ORAL at 11:17

## 2019-11-15 RX ADMIN — FUROSEMIDE 40 MG: 40 TABLET ORAL at 11:16

## 2019-11-15 RX ADMIN — POTASSIUM CHLORIDE 10 MEQ: 20 TABLET, EXTENDED RELEASE ORAL at 20:35

## 2019-11-15 RX ADMIN — Medication 10 ML: at 11:15

## 2019-11-15 RX ADMIN — METOPROLOL TARTRATE 25 MG: 25 TABLET ORAL at 11:16

## 2019-11-15 RX ADMIN — DIGOXIN 125 MCG: 125 TABLET ORAL at 11:16

## 2019-11-15 RX ADMIN — METOPROLOL TARTRATE 25 MG: 25 TABLET ORAL at 20:34

## 2019-11-15 RX ADMIN — MIDODRINE HYDROCHLORIDE 10 MG: 5 TABLET ORAL at 11:15

## 2019-11-15 RX ADMIN — LISINOPRIL 2.5 MG: 2.5 TABLET ORAL at 20:34

## 2019-11-15 RX ADMIN — MIDODRINE HYDROCHLORIDE 10 MG: 5 TABLET ORAL at 17:59

## 2019-11-15 RX ADMIN — ASPIRIN 81 MG 81 MG: 81 TABLET ORAL at 11:16

## 2019-11-15 RX ADMIN — TICAGRELOR 90 MG: 90 TABLET ORAL at 20:34

## 2019-11-15 ASSESSMENT — PAIN SCALES - GENERAL: PAINLEVEL_OUTOF10: 0

## 2019-11-16 LAB
ALBUMIN SERPL-MCNC: 2.6 GM/DL (ref 3.4–5)
ALP BLD-CCNC: 105 IU/L (ref 40–129)
ALT SERPL-CCNC: 103 U/L (ref 10–40)
ANION GAP SERPL CALCULATED.3IONS-SCNC: 13 MMOL/L (ref 4–16)
AST SERPL-CCNC: 41 IU/L (ref 15–37)
BASOPHILS ABSOLUTE: 0 K/CU MM
BASOPHILS RELATIVE PERCENT: 0.5 % (ref 0–1)
BILIRUB SERPL-MCNC: 1.5 MG/DL (ref 0–1)
BUN BLDV-MCNC: 19 MG/DL (ref 6–23)
CALCIUM SERPL-MCNC: 9.5 MG/DL (ref 8.3–10.6)
CHLORIDE BLD-SCNC: 97 MMOL/L (ref 99–110)
CO2: 28 MMOL/L (ref 21–32)
CREAT SERPL-MCNC: 1 MG/DL (ref 0.6–1.1)
DIFFERENTIAL TYPE: ABNORMAL
DIGOXIN LEVEL: 2 NG/ML (ref 0.8–2)
DOSE AMOUNT: NORMAL
DOSE TIME: NORMAL
EOSINOPHILS ABSOLUTE: 0.2 K/CU MM
EOSINOPHILS RELATIVE PERCENT: 2.4 % (ref 0–3)
GFR AFRICAN AMERICAN: >60 ML/MIN/1.73M2
GFR NON-AFRICAN AMERICAN: 53 ML/MIN/1.73M2
GLUCOSE BLD-MCNC: 105 MG/DL (ref 70–99)
HCT VFR BLD CALC: 37.4 % (ref 37–47)
HEMOGLOBIN: 11.2 GM/DL (ref 12.5–16)
IMMATURE NEUTROPHIL %: 0.7 % (ref 0–0.43)
LYMPHOCYTES ABSOLUTE: 0.8 K/CU MM
LYMPHOCYTES RELATIVE PERCENT: 9.1 % (ref 24–44)
MCH RBC QN AUTO: 25.7 PG (ref 27–31)
MCHC RBC AUTO-ENTMCNC: 29.9 % (ref 32–36)
MCV RBC AUTO: 86 FL (ref 78–100)
MONOCYTES ABSOLUTE: 0.6 K/CU MM
MONOCYTES RELATIVE PERCENT: 7 % (ref 0–4)
NUCLEATED RBC %: 0 %
PDW BLD-RTO: 22.8 % (ref 11.7–14.9)
PLATELET # BLD: 170 K/CU MM (ref 140–440)
PMV BLD AUTO: 11.9 FL (ref 7.5–11.1)
POTASSIUM SERPL-SCNC: 4 MMOL/L (ref 3.5–5.1)
PROCALCITONIN: 0.11
RBC # BLD: 4.35 M/CU MM (ref 4.2–5.4)
SEGMENTED NEUTROPHILS ABSOLUTE COUNT: 6.6 K/CU MM
SEGMENTED NEUTROPHILS RELATIVE PERCENT: 80.3 % (ref 36–66)
SODIUM BLD-SCNC: 138 MMOL/L (ref 135–145)
TOTAL IMMATURE NEUTOROPHIL: 0.06 K/CU MM
TOTAL NUCLEATED RBC: 0 K/CU MM
TOTAL PROTEIN: 6.4 GM/DL (ref 6.4–8.2)
WBC # BLD: 8.3 K/CU MM (ref 4–10.5)

## 2019-11-16 PROCEDURE — 1200000000 HC SEMI PRIVATE

## 2019-11-16 PROCEDURE — 2580000003 HC RX 258: Performed by: PHYSICIAN ASSISTANT

## 2019-11-16 PROCEDURE — 84145 PROCALCITONIN (PCT): CPT

## 2019-11-16 PROCEDURE — 6370000000 HC RX 637 (ALT 250 FOR IP): Performed by: INTERNAL MEDICINE

## 2019-11-16 PROCEDURE — 97530 THERAPEUTIC ACTIVITIES: CPT

## 2019-11-16 PROCEDURE — 2580000003 HC RX 258: Performed by: INTERNAL MEDICINE

## 2019-11-16 PROCEDURE — 6360000002 HC RX W HCPCS: Performed by: GENERAL PRACTICE

## 2019-11-16 PROCEDURE — 80162 ASSAY OF DIGOXIN TOTAL: CPT

## 2019-11-16 PROCEDURE — 85025 COMPLETE CBC W/AUTO DIFF WBC: CPT

## 2019-11-16 PROCEDURE — 80053 COMPREHEN METABOLIC PANEL: CPT

## 2019-11-16 PROCEDURE — 2580000003 HC RX 258: Performed by: GENERAL PRACTICE

## 2019-11-16 PROCEDURE — 36592 COLLECT BLOOD FROM PICC: CPT

## 2019-11-16 PROCEDURE — 97116 GAIT TRAINING THERAPY: CPT

## 2019-11-16 PROCEDURE — 94761 N-INVAS EAR/PLS OXIMETRY MLT: CPT

## 2019-11-16 RX ADMIN — DIGOXIN 125 MCG: 125 TABLET ORAL at 08:25

## 2019-11-16 RX ADMIN — ASPIRIN 81 MG 81 MG: 81 TABLET ORAL at 08:25

## 2019-11-16 RX ADMIN — TICAGRELOR 90 MG: 90 TABLET ORAL at 21:14

## 2019-11-16 RX ADMIN — PANTOPRAZOLE SODIUM 40 MG: 40 TABLET, DELAYED RELEASE ORAL at 06:21

## 2019-11-16 RX ADMIN — APIXABAN 2.5 MG: 2.5 TABLET, FILM COATED ORAL at 08:25

## 2019-11-16 RX ADMIN — TICAGRELOR 90 MG: 90 TABLET ORAL at 08:24

## 2019-11-16 RX ADMIN — POTASSIUM CHLORIDE 10 MEQ: 20 TABLET, EXTENDED RELEASE ORAL at 08:25

## 2019-11-16 RX ADMIN — FUROSEMIDE 40 MG: 40 TABLET ORAL at 08:24

## 2019-11-16 RX ADMIN — MIDODRINE HYDROCHLORIDE 10 MG: 5 TABLET ORAL at 08:24

## 2019-11-16 RX ADMIN — METOPROLOL TARTRATE 25 MG: 25 TABLET ORAL at 08:24

## 2019-11-16 RX ADMIN — Medication 10 ML: at 08:25

## 2019-11-16 RX ADMIN — APIXABAN 2.5 MG: 2.5 TABLET, FILM COATED ORAL at 21:14

## 2019-11-16 RX ADMIN — POTASSIUM CHLORIDE 10 MEQ: 20 TABLET, EXTENDED RELEASE ORAL at 21:14

## 2019-11-16 RX ADMIN — LATANOPROST 1 DROP: 50 SOLUTION OPHTHALMIC at 21:16

## 2019-11-16 RX ADMIN — MEROPENEM 1 G: 1 INJECTION, POWDER, FOR SOLUTION INTRAVENOUS at 02:33

## 2019-11-16 RX ADMIN — MIDODRINE HYDROCHLORIDE 10 MG: 5 TABLET ORAL at 17:57

## 2019-11-16 RX ADMIN — MEROPENEM 1 G: 1 INJECTION, POWDER, FOR SOLUTION INTRAVENOUS at 12:42

## 2019-11-16 RX ADMIN — METOPROLOL TARTRATE 25 MG: 25 TABLET ORAL at 21:14

## 2019-11-16 RX ADMIN — AMIODARONE HYDROCHLORIDE 200 MG: 200 TABLET ORAL at 08:24

## 2019-11-16 RX ADMIN — Medication 10 ML: at 21:15

## 2019-11-16 RX ADMIN — MIDODRINE HYDROCHLORIDE 10 MG: 5 TABLET ORAL at 12:52

## 2019-11-16 ASSESSMENT — PAIN SCALES - GENERAL
PAINLEVEL_OUTOF10: 0
PAINLEVEL_OUTOF10: 0

## 2019-11-16 ASSESSMENT — PAIN SCALES - WONG BAKER: WONGBAKER_NUMERICALRESPONSE: 0

## 2019-11-17 PROCEDURE — 6360000002 HC RX W HCPCS: Performed by: GENERAL PRACTICE

## 2019-11-17 PROCEDURE — 1200000000 HC SEMI PRIVATE

## 2019-11-17 PROCEDURE — 2580000003 HC RX 258: Performed by: GENERAL PRACTICE

## 2019-11-17 PROCEDURE — 2580000003 HC RX 258: Performed by: PHYSICIAN ASSISTANT

## 2019-11-17 PROCEDURE — 2580000003 HC RX 258: Performed by: INTERNAL MEDICINE

## 2019-11-17 PROCEDURE — 6370000000 HC RX 637 (ALT 250 FOR IP): Performed by: INTERNAL MEDICINE

## 2019-11-17 PROCEDURE — 94761 N-INVAS EAR/PLS OXIMETRY MLT: CPT

## 2019-11-17 RX ADMIN — MEROPENEM 1 G: 1 INJECTION, POWDER, FOR SOLUTION INTRAVENOUS at 13:30

## 2019-11-17 RX ADMIN — ASPIRIN 81 MG 81 MG: 81 TABLET ORAL at 09:57

## 2019-11-17 RX ADMIN — MIDODRINE HYDROCHLORIDE 10 MG: 5 TABLET ORAL at 09:56

## 2019-11-17 RX ADMIN — LATANOPROST 1 DROP: 50 SOLUTION OPHTHALMIC at 21:25

## 2019-11-17 RX ADMIN — POTASSIUM CHLORIDE 10 MEQ: 20 TABLET, EXTENDED RELEASE ORAL at 21:25

## 2019-11-17 RX ADMIN — METOPROLOL TARTRATE 25 MG: 25 TABLET ORAL at 09:56

## 2019-11-17 RX ADMIN — TICAGRELOR 90 MG: 90 TABLET ORAL at 21:24

## 2019-11-17 RX ADMIN — AMIODARONE HYDROCHLORIDE 200 MG: 200 TABLET ORAL at 09:56

## 2019-11-17 RX ADMIN — APIXABAN 2.5 MG: 2.5 TABLET, FILM COATED ORAL at 21:25

## 2019-11-17 RX ADMIN — MIDODRINE HYDROCHLORIDE 10 MG: 5 TABLET ORAL at 13:30

## 2019-11-17 RX ADMIN — POTASSIUM CHLORIDE 10 MEQ: 20 TABLET, EXTENDED RELEASE ORAL at 09:57

## 2019-11-17 RX ADMIN — DIGOXIN 125 MCG: 125 TABLET ORAL at 09:57

## 2019-11-17 RX ADMIN — MIDODRINE HYDROCHLORIDE 10 MG: 5 TABLET ORAL at 16:56

## 2019-11-17 RX ADMIN — Medication 10 ML: at 21:27

## 2019-11-17 RX ADMIN — Medication 10 ML: at 09:56

## 2019-11-17 RX ADMIN — APIXABAN 2.5 MG: 2.5 TABLET, FILM COATED ORAL at 09:57

## 2019-11-17 RX ADMIN — TICAGRELOR 90 MG: 90 TABLET ORAL at 09:57

## 2019-11-17 RX ADMIN — MEROPENEM 1 G: 1 INJECTION, POWDER, FOR SOLUTION INTRAVENOUS at 01:37

## 2019-11-17 RX ADMIN — Medication 10 ML: at 09:58

## 2019-11-17 RX ADMIN — Medication 10 ML: at 21:25

## 2019-11-17 RX ADMIN — FUROSEMIDE 40 MG: 40 TABLET ORAL at 09:57

## 2019-11-17 ASSESSMENT — PAIN SCALES - WONG BAKER
WONGBAKER_NUMERICALRESPONSE: 0
WONGBAKER_NUMERICALRESPONSE: 0

## 2019-11-17 ASSESSMENT — PAIN SCALES - GENERAL
PAINLEVEL_OUTOF10: 0

## 2019-11-18 LAB
ALBUMIN SERPL-MCNC: 2.8 GM/DL (ref 3.4–5)
ANION GAP SERPL CALCULATED.3IONS-SCNC: 11 MMOL/L (ref 4–16)
BUN BLDV-MCNC: 22 MG/DL (ref 6–23)
CALCIUM SERPL-MCNC: 9.4 MG/DL (ref 8.3–10.6)
CHLORIDE BLD-SCNC: 97 MMOL/L (ref 99–110)
CO2: 31 MMOL/L (ref 21–32)
CREAT SERPL-MCNC: 1 MG/DL (ref 0.6–1.1)
GFR AFRICAN AMERICAN: >60 ML/MIN/1.73M2
GFR NON-AFRICAN AMERICAN: 53 ML/MIN/1.73M2
GLUCOSE BLD-MCNC: 90 MG/DL (ref 70–99)
MAGNESIUM: 2.1 MG/DL (ref 1.8–2.4)
PHOSPHORUS: 3.4 MG/DL (ref 2.5–4.9)
POTASSIUM SERPL-SCNC: 4.1 MMOL/L (ref 3.5–5.1)
SODIUM BLD-SCNC: 139 MMOL/L (ref 135–145)

## 2019-11-18 PROCEDURE — 2580000003 HC RX 258: Performed by: PHYSICIAN ASSISTANT

## 2019-11-18 PROCEDURE — 6370000000 HC RX 637 (ALT 250 FOR IP): Performed by: INTERNAL MEDICINE

## 2019-11-18 PROCEDURE — 82040 ASSAY OF SERUM ALBUMIN: CPT

## 2019-11-18 PROCEDURE — 84100 ASSAY OF PHOSPHORUS: CPT

## 2019-11-18 PROCEDURE — 97116 GAIT TRAINING THERAPY: CPT

## 2019-11-18 PROCEDURE — 1200000000 HC SEMI PRIVATE

## 2019-11-18 PROCEDURE — 97112 NEUROMUSCULAR REEDUCATION: CPT

## 2019-11-18 PROCEDURE — 83735 ASSAY OF MAGNESIUM: CPT

## 2019-11-18 PROCEDURE — 80048 BASIC METABOLIC PNL TOTAL CA: CPT

## 2019-11-18 PROCEDURE — 6360000002 HC RX W HCPCS: Performed by: GENERAL PRACTICE

## 2019-11-18 PROCEDURE — 94761 N-INVAS EAR/PLS OXIMETRY MLT: CPT

## 2019-11-18 PROCEDURE — 2580000003 HC RX 258: Performed by: INTERNAL MEDICINE

## 2019-11-18 PROCEDURE — 97110 THERAPEUTIC EXERCISES: CPT

## 2019-11-18 PROCEDURE — 2580000003 HC RX 258: Performed by: GENERAL PRACTICE

## 2019-11-18 PROCEDURE — 97530 THERAPEUTIC ACTIVITIES: CPT

## 2019-11-18 PROCEDURE — 80299 QUANTITATIVE ASSAY DRUG: CPT

## 2019-11-18 RX ORDER — FUROSEMIDE 20 MG/1
20 TABLET ORAL DAILY
Qty: 30 TABLET | Refills: 1 | Status: ON HOLD | OUTPATIENT
Start: 2019-11-18 | End: 2020-05-06 | Stop reason: HOSPADM

## 2019-11-18 RX ORDER — POTASSIUM CHLORIDE 750 MG/1
10 TABLET, EXTENDED RELEASE ORAL 2 TIMES DAILY
Qty: 60 TABLET | Refills: 3 | Status: SHIPPED | OUTPATIENT
Start: 2019-11-18

## 2019-11-18 RX ORDER — ASPIRIN 81 MG/1
81 TABLET, CHEWABLE ORAL DAILY
Qty: 30 TABLET | Refills: 3 | Status: SHIPPED | OUTPATIENT
Start: 2019-11-18

## 2019-11-18 RX ORDER — MIDODRINE HYDROCHLORIDE 10 MG/1
10 TABLET ORAL
Qty: 90 TABLET | Refills: 3 | Status: SHIPPED | OUTPATIENT
Start: 2019-11-18

## 2019-11-18 RX ORDER — AMIODARONE HYDROCHLORIDE 200 MG/1
200 TABLET ORAL DAILY
Qty: 30 TABLET | Refills: 3 | Status: SHIPPED | OUTPATIENT
Start: 2019-11-18

## 2019-11-18 RX ORDER — DIGOXIN 125 MCG
125 TABLET ORAL DAILY
Qty: 30 TABLET | Refills: 3 | Status: ON HOLD | OUTPATIENT
Start: 2019-11-18 | End: 2020-05-06 | Stop reason: HOSPADM

## 2019-11-18 RX ORDER — METOPROLOL TARTRATE 50 MG/1
50 TABLET, FILM COATED ORAL 2 TIMES DAILY
Status: DISCONTINUED | OUTPATIENT
Start: 2019-11-18 | End: 2019-11-19 | Stop reason: HOSPADM

## 2019-11-18 RX ADMIN — POTASSIUM CHLORIDE 10 MEQ: 20 TABLET, EXTENDED RELEASE ORAL at 09:38

## 2019-11-18 RX ADMIN — PANTOPRAZOLE SODIUM 40 MG: 40 TABLET, DELAYED RELEASE ORAL at 08:16

## 2019-11-18 RX ADMIN — DIGOXIN 125 MCG: 125 TABLET ORAL at 09:38

## 2019-11-18 RX ADMIN — ASPIRIN 81 MG 81 MG: 81 TABLET ORAL at 09:37

## 2019-11-18 RX ADMIN — MEROPENEM 1 G: 1 INJECTION, POWDER, FOR SOLUTION INTRAVENOUS at 01:41

## 2019-11-18 RX ADMIN — APIXABAN 2.5 MG: 2.5 TABLET, FILM COATED ORAL at 21:36

## 2019-11-18 RX ADMIN — MEROPENEM 1 G: 1 INJECTION, POWDER, FOR SOLUTION INTRAVENOUS at 13:33

## 2019-11-18 RX ADMIN — TICAGRELOR 90 MG: 90 TABLET ORAL at 09:37

## 2019-11-18 RX ADMIN — AMIODARONE HYDROCHLORIDE 200 MG: 200 TABLET ORAL at 09:37

## 2019-11-18 RX ADMIN — POTASSIUM CHLORIDE 10 MEQ: 20 TABLET, EXTENDED RELEASE ORAL at 21:35

## 2019-11-18 RX ADMIN — Medication 10 ML: at 09:40

## 2019-11-18 RX ADMIN — APIXABAN 2.5 MG: 2.5 TABLET, FILM COATED ORAL at 09:39

## 2019-11-18 RX ADMIN — Medication 10 ML: at 01:41

## 2019-11-18 RX ADMIN — MIDODRINE HYDROCHLORIDE 10 MG: 5 TABLET ORAL at 08:16

## 2019-11-18 RX ADMIN — TICAGRELOR 90 MG: 90 TABLET ORAL at 21:36

## 2019-11-18 RX ADMIN — FUROSEMIDE 40 MG: 40 TABLET ORAL at 09:38

## 2019-11-18 RX ADMIN — MIDODRINE HYDROCHLORIDE 10 MG: 5 TABLET ORAL at 16:20

## 2019-11-18 RX ADMIN — METOPROLOL TARTRATE 50 MG: 50 TABLET, FILM COATED ORAL at 21:36

## 2019-11-18 RX ADMIN — Medication 10 ML: at 09:41

## 2019-11-18 RX ADMIN — LISINOPRIL 2.5 MG: 2.5 TABLET ORAL at 21:36

## 2019-11-18 RX ADMIN — METOPROLOL TARTRATE 25 MG: 25 TABLET ORAL at 09:37

## 2019-11-18 RX ADMIN — MIDODRINE HYDROCHLORIDE 10 MG: 5 TABLET ORAL at 11:58

## 2019-11-18 ASSESSMENT — PAIN SCALES - GENERAL
PAINLEVEL_OUTOF10: 0

## 2019-11-18 ASSESSMENT — PAIN DESCRIPTION - LOCATION: LOCATION: BACK;GENERALIZED

## 2019-11-18 ASSESSMENT — PAIN DESCRIPTION - PAIN TYPE: TYPE: CHRONIC PAIN

## 2019-11-19 VITALS
DIASTOLIC BLOOD PRESSURE: 81 MMHG | OXYGEN SATURATION: 97 % | SYSTOLIC BLOOD PRESSURE: 135 MMHG | HEART RATE: 58 BPM | HEIGHT: 67 IN | TEMPERATURE: 97 F | BODY MASS INDEX: 22.04 KG/M2 | RESPIRATION RATE: 10 BRPM | WEIGHT: 140.44 LBS

## 2019-11-19 PROCEDURE — 97116 GAIT TRAINING THERAPY: CPT

## 2019-11-19 PROCEDURE — 2580000003 HC RX 258: Performed by: GENERAL PRACTICE

## 2019-11-19 PROCEDURE — 6360000002 HC RX W HCPCS: Performed by: GENERAL PRACTICE

## 2019-11-19 PROCEDURE — 2580000003 HC RX 258: Performed by: PHYSICIAN ASSISTANT

## 2019-11-19 PROCEDURE — 6370000000 HC RX 637 (ALT 250 FOR IP): Performed by: INTERNAL MEDICINE

## 2019-11-19 PROCEDURE — 94761 N-INVAS EAR/PLS OXIMETRY MLT: CPT

## 2019-11-19 RX ADMIN — ASPIRIN 81 MG 81 MG: 81 TABLET ORAL at 10:14

## 2019-11-19 RX ADMIN — MIDODRINE HYDROCHLORIDE 10 MG: 5 TABLET ORAL at 12:31

## 2019-11-19 RX ADMIN — PANTOPRAZOLE SODIUM 40 MG: 40 TABLET, DELAYED RELEASE ORAL at 10:15

## 2019-11-19 RX ADMIN — Medication 10 ML: at 10:14

## 2019-11-19 RX ADMIN — DIGOXIN 125 MCG: 125 TABLET ORAL at 10:14

## 2019-11-19 RX ADMIN — AMIODARONE HYDROCHLORIDE 200 MG: 200 TABLET ORAL at 10:14

## 2019-11-19 RX ADMIN — POTASSIUM CHLORIDE 10 MEQ: 20 TABLET, EXTENDED RELEASE ORAL at 10:15

## 2019-11-19 RX ADMIN — TICAGRELOR 90 MG: 90 TABLET ORAL at 10:15

## 2019-11-19 RX ADMIN — MIDODRINE HYDROCHLORIDE 10 MG: 5 TABLET ORAL at 10:14

## 2019-11-19 RX ADMIN — APIXABAN 2.5 MG: 2.5 TABLET, FILM COATED ORAL at 10:15

## 2019-11-19 RX ADMIN — METOPROLOL TARTRATE 50 MG: 50 TABLET, FILM COATED ORAL at 10:15

## 2019-11-19 RX ADMIN — Medication 10 ML: at 01:18

## 2019-11-19 RX ADMIN — MEROPENEM 1 G: 1 INJECTION, POWDER, FOR SOLUTION INTRAVENOUS at 01:18

## 2019-11-19 RX ADMIN — FUROSEMIDE 40 MG: 40 TABLET ORAL at 10:15

## 2019-11-19 ASSESSMENT — PAIN SCALES - GENERAL
PAINLEVEL_OUTOF10: 0

## 2019-11-20 LAB
DIGITOXIN LVL: <9 NG/ML (ref 10–30)
DIGITOXIN LVL: ABNORMAL NG/ML (ref 10–30)

## 2019-11-22 ENCOUNTER — HOSPITAL ENCOUNTER (OUTPATIENT)
Age: 84
Discharge: HOME | End: 2019-11-22

## 2019-11-22 LAB
ALBUMIN SERPL-MCNC: 3.3 GM/DL (ref 3.4–5)
ALP BLD-CCNC: 99 IU/L (ref 40–128)
ALT SERPL-CCNC: 30 U/L (ref 10–40)
ANION GAP SERPL CALCULATED.3IONS-SCNC: 13 MMOL/L (ref 4–16)
AST SERPL-CCNC: 31 IU/L (ref 15–37)
BILIRUB SERPL-MCNC: 1.2 MG/DL (ref 0–1)
BUN BLDV-MCNC: 29 MG/DL (ref 6–23)
CALCIUM SERPL-MCNC: 9.9 MG/DL (ref 8.3–10.6)
CHLORIDE BLD-SCNC: 94 MMOL/L (ref 99–110)
CO2: 29 MMOL/L (ref 21–32)
CREAT SERPL-MCNC: 1.1 MG/DL (ref 0.6–1.1)
GFR AFRICAN AMERICAN: 57 ML/MIN/1.73M2
GFR NON-AFRICAN AMERICAN: 47 ML/MIN/1.73M2
GLUCOSE BLD-MCNC: 119 MG/DL (ref 70–99)
HCT VFR BLD CALC: 39.6 % (ref 37–47)
HEMOGLOBIN: 11.8 GM/DL (ref 12.5–16)
MCH RBC QN AUTO: 26.1 PG (ref 27–31)
MCHC RBC AUTO-ENTMCNC: 29.8 % (ref 32–36)
MCV RBC AUTO: 87.6 FL (ref 78–100)
PDW BLD-RTO: 22.1 % (ref 11.7–14.9)
PLATELET # BLD: 327 K/CU MM (ref 140–440)
PMV BLD AUTO: 11.7 FL (ref 7.5–11.1)
POTASSIUM SERPL-SCNC: 4.8 MMOL/L (ref 3.5–5.1)
PRO-BNP: ABNORMAL PG/ML
RBC # BLD: 4.52 M/CU MM (ref 4.2–5.4)
SODIUM BLD-SCNC: 136 MMOL/L (ref 135–145)
TOTAL PROTEIN: 7.2 GM/DL (ref 6.4–8.2)
WBC # BLD: 6.5 K/CU MM (ref 4–10.5)

## 2019-11-22 PROCEDURE — 85027 COMPLETE CBC AUTOMATED: CPT

## 2019-11-22 PROCEDURE — 80053 COMPREHEN METABOLIC PANEL: CPT

## 2019-11-22 PROCEDURE — 36415 COLL VENOUS BLD VENIPUNCTURE: CPT

## 2019-11-22 PROCEDURE — 83880 ASSAY OF NATRIURETIC PEPTIDE: CPT

## 2019-11-27 ENCOUNTER — HOSPITAL ENCOUNTER (OUTPATIENT)
Age: 84
Discharge: HOME | End: 2019-11-27

## 2019-11-27 LAB
ANION GAP SERPL CALCULATED.3IONS-SCNC: 15 MMOL/L (ref 4–16)
BUN BLDV-MCNC: 14 MG/DL (ref 6–23)
CALCIUM SERPL-MCNC: 9.7 MG/DL (ref 8.3–10.6)
CHLORIDE BLD-SCNC: 97 MMOL/L (ref 99–110)
CO2: 27 MMOL/L (ref 21–32)
CREAT SERPL-MCNC: 1.1 MG/DL (ref 0.6–1.1)
GFR AFRICAN AMERICAN: 57 ML/MIN/1.73M2
GFR NON-AFRICAN AMERICAN: 47 ML/MIN/1.73M2
GLUCOSE BLD-MCNC: 118 MG/DL (ref 70–99)
POTASSIUM SERPL-SCNC: 3.9 MMOL/L (ref 3.5–5.1)
SODIUM BLD-SCNC: 139 MMOL/L (ref 135–145)

## 2019-11-27 PROCEDURE — 80048 BASIC METABOLIC PNL TOTAL CA: CPT

## 2019-11-27 PROCEDURE — 36415 COLL VENOUS BLD VENIPUNCTURE: CPT

## 2019-12-19 ENCOUNTER — HOSPITAL ENCOUNTER (OUTPATIENT)
Age: 84
Setting detail: SPECIMEN
Discharge: HOME | End: 2019-12-19
Payer: MEDICARE

## 2019-12-19 LAB
ALBUMIN SERPL-MCNC: 3.7 GM/DL (ref 3.4–5)
ALP BLD-CCNC: 122 IU/L (ref 40–128)
ALT SERPL-CCNC: 12 U/L (ref 10–40)
ANION GAP SERPL CALCULATED.3IONS-SCNC: 15 MMOL/L (ref 4–16)
AST SERPL-CCNC: 24 IU/L (ref 15–37)
BASOPHILS ABSOLUTE: 0 K/CU MM
BASOPHILS ABSOLUTE: ABNORMAL K/CU MM
BASOPHILS RELATIVE PERCENT: 0.7 % (ref 0–1)
BILIRUB SERPL-MCNC: 0.8 MG/DL (ref 0–1)
BUN BLDV-MCNC: 23 MG/DL (ref 6–23)
CALCIUM SERPL-MCNC: 9.7 MG/DL (ref 8.3–10.6)
CHLORIDE BLD-SCNC: 100 MMOL/L (ref 99–110)
CO2: 25 MMOL/L (ref 21–32)
CREAT SERPL-MCNC: 1.3 MG/DL (ref 0.6–1.1)
DIFFERENTIAL TYPE: ABNORMAL
DIFFERENTIAL TYPE: ABNORMAL
EOSINOPHILS ABSOLUTE: 0.1 K/CU MM
EOSINOPHILS RELATIVE PERCENT: 1 % (ref 0–3)
GFR AFRICAN AMERICAN: 47 ML/MIN/1.73M2
GFR NON-AFRICAN AMERICAN: 39 ML/MIN/1.73M2
GLUCOSE BLD-MCNC: 130 MG/DL (ref 70–99)
HCT VFR BLD CALC: 50.8 % (ref 37–47)
HEMOGLOBIN: 15.7 GM/DL (ref 12.5–16)
IMMATURE NEUTROPHIL %: 0.3 % (ref 0–0.43)
LYMPHOCYTES ABSOLUTE: 0.8 K/CU MM
LYMPHOCYTES RELATIVE PERCENT: 11.4 % (ref 24–44)
MAGNESIUM: 1.7 MG/DL (ref 1.8–2.4)
MCH RBC QN AUTO: 26.6 PG (ref 27–31)
MCHC RBC AUTO-ENTMCNC: 30.9 % (ref 32–36)
MCV RBC AUTO: 86 FL (ref 78–100)
MONOCYTES ABSOLUTE: 0.3 K/CU MM
MONOCYTES RELATIVE PERCENT: 4.7 % (ref 0–4)
NUCLEATED RBC %: 0 %
PDW BLD-RTO: 20.1 % (ref 11.7–14.9)
PLATELET # BLD: 252 K/CU MM (ref 140–440)
PMV BLD AUTO: 11.8 FL (ref 7.5–11.1)
POTASSIUM SERPL-SCNC: 4.7 MMOL/L (ref 3.5–5.1)
RBC # BLD: 5.91 M/CU MM (ref 4.2–5.4)
SEGMENTED NEUTROPHILS ABSOLUTE COUNT: 5.6 K/CU MM
SEGMENTED NEUTROPHILS RELATIVE PERCENT: 81.9 % (ref 36–66)
SODIUM BLD-SCNC: 140 MMOL/L (ref 135–145)
TOTAL IMMATURE NEUTOROPHIL: 0.02 K/CU MM
TOTAL NUCLEATED RBC: 0 K/CU MM
TOTAL PROTEIN: 8.2 GM/DL (ref 6.4–8.2)
TOTAL RETICULOCYTE COUNT: 0.04 K/CU MM
WBC # BLD: 6.8 K/CU MM (ref 4–10.5)

## 2019-12-19 PROCEDURE — 83735 ASSAY OF MAGNESIUM: CPT

## 2019-12-19 PROCEDURE — 80053 COMPREHEN METABOLIC PANEL: CPT

## 2019-12-19 PROCEDURE — 85025 COMPLETE CBC W/AUTO DIFF WBC: CPT

## 2020-04-30 ENCOUNTER — HOSPITAL ENCOUNTER (INPATIENT)
Age: 85
LOS: 6 days | Discharge: NURSING, REHAB OR LONG-TERM CARE | DRG: 242 | End: 2020-05-06
Attending: EMERGENCY MEDICINE | Admitting: INTERNAL MEDICINE
Payer: MEDICARE

## 2020-04-30 ENCOUNTER — APPOINTMENT (OUTPATIENT)
Dept: GENERAL RADIOLOGY | Age: 85
DRG: 242 | End: 2020-04-30
Payer: MEDICARE

## 2020-04-30 PROBLEM — I50.23 ACUTE ON CHRONIC SYSTOLIC HEART FAILURE (HCC): Status: ACTIVE | Noted: 2020-04-30

## 2020-04-30 LAB
ALBUMIN SERPL-MCNC: 2.3 GM/DL (ref 3.4–5)
ALP BLD-CCNC: 139 IU/L (ref 40–129)
ALT SERPL-CCNC: 19 U/L (ref 10–40)
ANION GAP SERPL CALCULATED.3IONS-SCNC: 10 MMOL/L (ref 4–16)
AST SERPL-CCNC: 18 IU/L (ref 15–37)
BACTERIA: ABNORMAL /HPF
BASE EXCESS MIXED: 5.1 (ref 0–2.3)
BASOPHILS ABSOLUTE: 0 K/CU MM
BASOPHILS RELATIVE PERCENT: 0 % (ref 0–1)
BILIRUB SERPL-MCNC: 0.4 MG/DL (ref 0–1)
BILIRUBIN URINE: NEGATIVE MG/DL
BLOOD, URINE: ABNORMAL
BUN BLDV-MCNC: 23 MG/DL (ref 6–23)
CALCIUM SERPL-MCNC: 8.9 MG/DL (ref 8.3–10.6)
CHLORIDE BLD-SCNC: 86 MMOL/L (ref 99–110)
CLARITY: CLEAR
CO2: 30 MMOL/L (ref 21–32)
COLOR: ABNORMAL
COMMENT: ABNORMAL
CREAT SERPL-MCNC: 1 MG/DL (ref 0.6–1.1)
DIFFERENTIAL TYPE: ABNORMAL
DIGOXIN LEVEL: <0.3 NG/ML (ref 0.8–2)
DOSE AMOUNT: ABNORMAL
DOSE TIME: ABNORMAL
EOSINOPHILS ABSOLUTE: 0 K/CU MM
EOSINOPHILS RELATIVE PERCENT: 0.1 % (ref 0–3)
GFR AFRICAN AMERICAN: >60 ML/MIN/1.73M2
GFR NON-AFRICAN AMERICAN: 53 ML/MIN/1.73M2
GLUCOSE BLD-MCNC: 157 MG/DL (ref 70–99)
GLUCOSE, URINE: NEGATIVE MG/DL
HCO3 VENOUS: 34.1 MMOL/L (ref 19–25)
HCT VFR BLD CALC: 38.5 % (ref 37–47)
HEMOGLOBIN: 11.8 GM/DL (ref 12.5–16)
HYALINE CASTS: 2 /LPF
IMMATURE NEUTROPHIL %: 0.6 % (ref 0–0.43)
KETONES, URINE: NEGATIVE MG/DL
LEUKOCYTE ESTERASE, URINE: NEGATIVE
LYMPHOCYTES ABSOLUTE: 0.4 K/CU MM
LYMPHOCYTES RELATIVE PERCENT: 4.1 % (ref 24–44)
MAGNESIUM: 2.2 MG/DL (ref 1.8–2.4)
MCH RBC QN AUTO: 28.2 PG (ref 27–31)
MCHC RBC AUTO-ENTMCNC: 30.6 % (ref 32–36)
MCV RBC AUTO: 92.1 FL (ref 78–100)
MONOCYTES ABSOLUTE: 0.4 K/CU MM
MONOCYTES RELATIVE PERCENT: 4.7 % (ref 0–4)
NITRITE URINE, QUANTITATIVE: NEGATIVE
NUCLEATED RBC %: 0 %
O2 SAT, VEN: 75.6 % (ref 50–70)
PCO2, VEN: 71 MMHG (ref 38–52)
PDW BLD-RTO: 17.6 % (ref 11.7–14.9)
PH VENOUS: 7.29 (ref 7.32–7.42)
PH, URINE: 6 (ref 5–8)
PLATELET # BLD: 244 K/CU MM (ref 140–440)
PMV BLD AUTO: 10.2 FL (ref 7.5–11.1)
PO2, VEN: 43 MMHG (ref 28–48)
POTASSIUM SERPL-SCNC: 3.8 MMOL/L (ref 3.5–5.1)
PRO-BNP: ABNORMAL PG/ML
PROTEIN UA: NEGATIVE MG/DL
RBC # BLD: 4.18 M/CU MM (ref 4.2–5.4)
RBC URINE: 2 /HPF (ref 0–6)
SEGMENTED NEUTROPHILS ABSOLUTE COUNT: 8.1 K/CU MM
SEGMENTED NEUTROPHILS RELATIVE PERCENT: 90.5 % (ref 36–66)
SODIUM BLD-SCNC: 126 MMOL/L (ref 135–145)
SPECIFIC GRAVITY UA: 1 (ref 1–1.03)
SQUAMOUS EPITHELIAL: 1 /HPF
TOTAL IMMATURE NEUTOROPHIL: 0.05 K/CU MM
TOTAL NUCLEATED RBC: 0 K/CU MM
TOTAL PROTEIN: 6.5 GM/DL (ref 6.4–8.2)
TRICHOMONAS: ABNORMAL /HPF
TROPONIN T: <0.01 NG/ML
UROBILINOGEN, URINE: NORMAL MG/DL (ref 0.2–1)
WBC # BLD: 9 K/CU MM (ref 4–10.5)
WBC UA: <1 /HPF (ref 0–5)

## 2020-04-30 PROCEDURE — 2140000000 HC CCU INTERMEDIATE R&B

## 2020-04-30 PROCEDURE — 6360000002 HC RX W HCPCS: Performed by: PHYSICIAN ASSISTANT

## 2020-04-30 PROCEDURE — 84443 ASSAY THYROID STIM HORMONE: CPT

## 2020-04-30 PROCEDURE — 71045 X-RAY EXAM CHEST 1 VIEW: CPT

## 2020-04-30 PROCEDURE — 94660 CPAP INITIATION&MGMT: CPT

## 2020-04-30 PROCEDURE — 6370000000 HC RX 637 (ALT 250 FOR IP): Performed by: NURSE PRACTITIONER

## 2020-04-30 PROCEDURE — 82805 BLOOD GASES W/O2 SATURATION: CPT

## 2020-04-30 PROCEDURE — 83735 ASSAY OF MAGNESIUM: CPT

## 2020-04-30 PROCEDURE — 83540 ASSAY OF IRON: CPT

## 2020-04-30 PROCEDURE — 83880 ASSAY OF NATRIURETIC PEPTIDE: CPT

## 2020-04-30 PROCEDURE — 84484 ASSAY OF TROPONIN QUANT: CPT

## 2020-04-30 PROCEDURE — 93005 ELECTROCARDIOGRAM TRACING: CPT | Performed by: PHYSICIAN ASSISTANT

## 2020-04-30 PROCEDURE — 80053 COMPREHEN METABOLIC PANEL: CPT

## 2020-04-30 PROCEDURE — 81001 URINALYSIS AUTO W/SCOPE: CPT

## 2020-04-30 PROCEDURE — 2580000003 HC RX 258: Performed by: NURSE PRACTITIONER

## 2020-04-30 PROCEDURE — 96374 THER/PROPH/DIAG INJ IV PUSH: CPT

## 2020-04-30 PROCEDURE — 85025 COMPLETE CBC W/AUTO DIFF WBC: CPT

## 2020-04-30 PROCEDURE — 99291 CRITICAL CARE FIRST HOUR: CPT

## 2020-04-30 PROCEDURE — 83550 IRON BINDING TEST: CPT

## 2020-04-30 PROCEDURE — 80162 ASSAY OF DIGOXIN TOTAL: CPT

## 2020-04-30 RX ORDER — ONDANSETRON 2 MG/ML
4 INJECTION INTRAMUSCULAR; INTRAVENOUS EVERY 6 HOURS PRN
Status: DISCONTINUED | OUTPATIENT
Start: 2020-04-30 | End: 2020-04-30

## 2020-04-30 RX ORDER — FUROSEMIDE 10 MG/ML
40 INJECTION INTRAMUSCULAR; INTRAVENOUS 2 TIMES DAILY
Status: DISCONTINUED | OUTPATIENT
Start: 2020-05-01 | End: 2020-05-02

## 2020-04-30 RX ORDER — LANOLIN ALCOHOL/MO/W.PET/CERES
400 CREAM (GRAM) TOPICAL DAILY
Status: DISCONTINUED | OUTPATIENT
Start: 2020-05-01 | End: 2020-05-06 | Stop reason: HOSPADM

## 2020-04-30 RX ORDER — ATORVASTATIN CALCIUM 10 MG/1
10 TABLET, FILM COATED ORAL DAILY
Status: DISCONTINUED | OUTPATIENT
Start: 2020-05-01 | End: 2020-05-06 | Stop reason: HOSPADM

## 2020-04-30 RX ORDER — ACETAMINOPHEN 650 MG/1
650 SUPPOSITORY RECTAL EVERY 6 HOURS PRN
Status: DISCONTINUED | OUTPATIENT
Start: 2020-04-30 | End: 2020-05-06 | Stop reason: HOSPADM

## 2020-04-30 RX ORDER — LATANOPROST 50 UG/ML
1 SOLUTION/ DROPS OPHTHALMIC NIGHTLY
Status: DISCONTINUED | OUTPATIENT
Start: 2020-04-30 | End: 2020-05-06 | Stop reason: HOSPADM

## 2020-04-30 RX ORDER — PANTOPRAZOLE SODIUM 40 MG/1
40 TABLET, DELAYED RELEASE ORAL DAILY
Status: DISCONTINUED | OUTPATIENT
Start: 2020-05-01 | End: 2020-05-06 | Stop reason: HOSPADM

## 2020-04-30 RX ORDER — ASPIRIN 81 MG/1
81 TABLET, CHEWABLE ORAL DAILY
Status: DISCONTINUED | OUTPATIENT
Start: 2020-05-01 | End: 2020-05-04

## 2020-04-30 RX ORDER — DIGOXIN 125 MCG
125 TABLET ORAL DAILY
Status: DISCONTINUED | OUTPATIENT
Start: 2020-05-01 | End: 2020-05-01

## 2020-04-30 RX ORDER — MIDODRINE HYDROCHLORIDE 5 MG/1
10 TABLET ORAL
Status: DISCONTINUED | OUTPATIENT
Start: 2020-05-01 | End: 2020-05-02

## 2020-04-30 RX ORDER — POLYETHYLENE GLYCOL 3350 17 G/17G
17 POWDER, FOR SOLUTION ORAL DAILY PRN
Status: DISCONTINUED | OUTPATIENT
Start: 2020-04-30 | End: 2020-05-06 | Stop reason: HOSPADM

## 2020-04-30 RX ORDER — PROMETHAZINE HYDROCHLORIDE 25 MG/1
12.5 TABLET ORAL EVERY 6 HOURS PRN
Status: DISCONTINUED | OUTPATIENT
Start: 2020-04-30 | End: 2020-05-06 | Stop reason: HOSPADM

## 2020-04-30 RX ORDER — ACETAMINOPHEN 325 MG/1
650 TABLET ORAL EVERY 6 HOURS PRN
Status: DISCONTINUED | OUTPATIENT
Start: 2020-04-30 | End: 2020-05-02 | Stop reason: SDUPTHER

## 2020-04-30 RX ORDER — MAGNESIUM GLUCONATE 27 MG(500)
250 TABLET ORAL 2 TIMES DAILY
Status: DISCONTINUED | OUTPATIENT
Start: 2020-04-30 | End: 2020-04-30 | Stop reason: CLARIF

## 2020-04-30 RX ORDER — FUROSEMIDE 10 MG/ML
40 INJECTION INTRAMUSCULAR; INTRAVENOUS ONCE
Status: COMPLETED | OUTPATIENT
Start: 2020-04-30 | End: 2020-04-30

## 2020-04-30 RX ORDER — SODIUM CHLORIDE 0.9 % (FLUSH) 0.9 %
10 SYRINGE (ML) INJECTION PRN
Status: DISCONTINUED | OUTPATIENT
Start: 2020-04-30 | End: 2020-05-01 | Stop reason: SDUPTHER

## 2020-04-30 RX ORDER — POTASSIUM CHLORIDE 20 MEQ/1
10 TABLET, EXTENDED RELEASE ORAL 2 TIMES DAILY
Status: DISCONTINUED | OUTPATIENT
Start: 2020-04-30 | End: 2020-05-03

## 2020-04-30 RX ORDER — SODIUM CHLORIDE 0.9 % (FLUSH) 0.9 %
10 SYRINGE (ML) INJECTION EVERY 12 HOURS SCHEDULED
Status: DISCONTINUED | OUTPATIENT
Start: 2020-04-30 | End: 2020-05-01 | Stop reason: SDUPTHER

## 2020-04-30 RX ORDER — DOBUTAMINE HYDROCHLORIDE 200 MG/100ML
2.5 INJECTION INTRAVENOUS CONTINUOUS
Status: DISCONTINUED | OUTPATIENT
Start: 2020-05-01 | End: 2020-05-02

## 2020-04-30 RX ORDER — AMIODARONE HYDROCHLORIDE 200 MG/1
200 TABLET ORAL DAILY
Status: DISCONTINUED | OUTPATIENT
Start: 2020-05-01 | End: 2020-05-06 | Stop reason: HOSPADM

## 2020-04-30 RX ADMIN — POTASSIUM CHLORIDE 10 MEQ: 1500 TABLET, EXTENDED RELEASE ORAL at 23:08

## 2020-04-30 RX ADMIN — APIXABAN 2.5 MG: 2.5 TABLET, FILM COATED ORAL at 23:08

## 2020-04-30 RX ADMIN — LATANOPROST 1 DROP: 50 SOLUTION OPHTHALMIC at 23:35

## 2020-04-30 RX ADMIN — TICAGRELOR 90 MG: 90 TABLET ORAL at 23:08

## 2020-04-30 RX ADMIN — FUROSEMIDE 40 MG: 10 INJECTION, SOLUTION INTRAVENOUS at 20:46

## 2020-04-30 RX ADMIN — METOPROLOL TARTRATE 25 MG: 25 TABLET, FILM COATED ORAL at 23:08

## 2020-04-30 RX ADMIN — SODIUM CHLORIDE, PRESERVATIVE FREE 10 ML: 5 INJECTION INTRAVENOUS at 23:09

## 2020-04-30 ASSESSMENT — PAIN SCALES - GENERAL: PAINLEVEL_OUTOF10: 0

## 2020-04-30 NOTE — ED NOTES
Bed: 04TR-04  Expected date:   Expected time:   Means of arrival:   Comments:  Hold EMS     Vicki Alarcon RN  04/30/20 [id number]

## 2020-04-30 NOTE — ED PROVIDER NOTES
CHOLECYSTECTOMY  2015    EYE SURGERY Bilateral 2000 & 2009    cataracts    HYSTERECTOMY  1985    TONSILLECTOMY AND ADENOIDECTOMY  1943       CURRENT MEDICATIONS    Current Outpatient Rx   Medication Sig Dispense Refill    aspirin 81 MG chewable tablet Take 1 tablet by mouth daily 30 tablet 3    amiodarone (CORDARONE) 200 MG tablet Take 1 tablet by mouth daily 30 tablet 3    apixaban (ELIQUIS) 2.5 MG TABS tablet Take 1 tablet by mouth 2 times daily 60 tablet 0    metoprolol tartrate (LOPRESSOR) 25 MG tablet Take 1 tablet by mouth 2 times daily 60 tablet 3    digoxin (LANOXIN) 125 MCG tablet Take 1 tablet by mouth daily 30 tablet 3    furosemide (LASIX) 20 MG tablet Take 1 tablet by mouth daily 30 tablet 1    potassium chloride (KLOR-CON M) 10 MEQ extended release tablet Take 1 tablet by mouth 2 times daily 60 tablet 3    ticagrelor (BRILINTA) 90 MG TABS tablet Take 1 tablet by mouth 2 times daily 60 tablet 1    midodrine (PROAMATINE) 10 MG tablet Take 1 tablet by mouth 3 times daily (with meals) 90 tablet 3    magnesium gluconate (MAGONATE) 500 MG tablet Take 250 mg by mouth 2 times daily      travoprost, benzalkonium, (TRAVATAN) 0.004 % ophthalmic solution Place 1 drop into both eyes nightly       pantoprazole (PROTONIX) 40 MG tablet Take 1 tablet by mouth daily 30 tablet 3    atorvastatin (LIPITOR) 10 MG tablet Take 10 mg by mouth daily         ALLERGIES    Allergies   Allergen Reactions    Zinc     Pcn [Penicillins] Rash       SOCIAL AND FAMILY HISTORY    Social History     Socioeconomic History    Marital status:      Spouse name: Not on file    Number of children: Not on file    Years of education: Not on file    Highest education level: Not on file   Occupational History    Not on file   Social Needs    Financial resource strain: Not on file    Food insecurity     Worry: Not on file     Inability: Not on file    Transportation needs     Medical: Not on file     Non-medical: Not on file   Tobacco Use    Smoking status: Former Smoker     Packs/day: 1.00     Years: 59.00     Pack years: 59.00     Types: Cigarettes     Start date:      Last attempt to quit: 2015     Years since quittin.3    Smokeless tobacco: Never Used   Substance and Sexual Activity    Alcohol use: Not Currently     Alcohol/week: 0.0 standard drinks     Comment: No alcohol since , rarely before that    Drug use: No    Sexual activity: Not on file   Lifestyle    Physical activity     Days per week: Not on file     Minutes per session: Not on file    Stress: Not on file   Relationships    Social connections     Talks on phone: Not on file     Gets together: Not on file     Attends Qu[employer] service: Not on file     Active member of club or organization: Not on file     Attends meetings of clubs or organizations: Not on file     Relationship status: Not on file    Intimate partner violence     Fear of current or ex partner: Not on file     Emotionally abused: Not on file     Physically abused: Not on file     Forced sexual activity: Not on file   Other Topics Concern    Not on file   Social History Narrative    Not on file     Family History   Problem Relation Age of Onset    Heart Disease Mother     High Blood Pressure Mother     Heart Disease Father     High Blood Pressure Father     Heart Attack Father          PHYSICAL EXAM    VITAL SIGNS: /82   Pulse 90   Temp 98.6 °F (37 °C) (Oral)   Resp 19   Ht 5' 6\" (1.676 m)   Wt 147 lb (66.7 kg)   SpO2 95%   BMI 23.73 kg/m²    Constitutional:  Well developed, Well nourished  HENT:  Normocephalic, Atraumatic, PERRL. EOMI. Sclera clear. Conjunctiva normal, No discharge. Neck/Lymphatics: supple, no JVD, no swollen nodes  Cardiovascular:  Normal heart rate, Normal rhythm, No murmurs  Respiratory:  Nonlabored breathing. Normal breath sounds, No wheezing  Abdomen: Bowel sounds normal, Soft, No tenderness, no masses.   Musculoskeletal:  No tenderness, No cyanosis  3+ pitting edema up to the knees bilaterally  Integument:  Warm, Dry  Neurologic:  Alert & oriented , No focal deficits noted. Cranial nerves II through XII grossly intact. Finger to nose intact, rapid alternating movements intact. Equal strength bilateral upper and lower extremities but it is 3 out of 5 bilaterally. upper and lower extremity DTRs intact. Sensation intact.   Psychiatric:  Affect normal, Mood normal.       Labs:  Results for orders placed or performed during the hospital encounter of 04/30/20   CBC w/ auto diff   Result Value Ref Range    WBC 9.0 4.0 - 10.5 K/CU MM    RBC 4.18 (L) 4.2 - 5.4 M/CU MM    Hemoglobin 11.8 (L) 12.5 - 16.0 GM/DL    Hematocrit 38.5 37 - 47 %    MCV 92.1 78 - 100 FL    MCH 28.2 27 - 31 PG    MCHC 30.6 (L) 32.0 - 36.0 %    RDW 17.6 (H) 11.7 - 14.9 %    Platelets 770 675 - 628 K/CU MM    MPV 10.2 7.5 - 11.1 FL    Differential Type AUTOMATED DIFFERENTIAL     Segs Relative 90.5 (H) 36 - 66 %    Lymphocytes % 4.1 (L) 24 - 44 %    Monocytes % 4.7 (H) 0 - 4 %    Eosinophils % 0.1 0 - 3 %    Basophils % 0.0 0 - 1 %    Segs Absolute 8.1 K/CU MM    Lymphocytes Absolute 0.4 K/CU MM    Monocytes Absolute 0.4 K/CU MM    Eosinophils Absolute 0.0 K/CU MM    Basophils Absolute 0.0 K/CU MM    Nucleated RBC % 0.0 %    Total Nucleated RBC 0.0 K/CU MM    Total Immature Neutrophil 0.05 K/CU MM    Immature Neutrophil % 0.6 (H) 0 - 0.43 %   CMP   Result Value Ref Range    Sodium 126 (L) 135 - 145 MMOL/L    Potassium 3.8 3.5 - 5.1 MMOL/L    Chloride 86 (L) 99 - 110 mMol/L    CO2 30 21 - 32 MMOL/L    BUN 23 6 - 23 MG/DL    CREATININE 1.0 0.6 - 1.1 MG/DL    Glucose 157 (H) 70 - 99 MG/DL    Calcium 8.9 8.3 - 10.6 MG/DL    Alb 2.3 (L) 3.4 - 5.0 GM/DL    Total Protein 6.5 6.4 - 8.2 GM/DL    Total Bilirubin 0.4 0.0 - 1.0 MG/DL    ALT 19 10 - 40 U/L    AST 18 15 - 37 IU/L    Alkaline Phosphatase 139 (H) 40 - 129 IU/L    GFR Non- 53 (L) >60 mL/min/1.73m2    GFR African American >60 >60 mL/min/1.73m2    Anion Gap 10 4 - 16   Troponin   Result Value Ref Range    Troponin T <0.010 <0.01 NG/ML   Brain Natriuretic Peptide   Result Value Ref Range    Pro-BNP 30,313 (H) <300 PG/ML   Urinalysis   Result Value Ref Range    Color, UA STRAW (A) YELLOW    Clarity, UA CLEAR CLEAR    Glucose, Urine NEGATIVE NEGATIVE MG/DL    Bilirubin Urine NEGATIVE NEGATIVE MG/DL    Ketones, Urine NEGATIVE NEGATIVE MG/DL    Specific Gravity, UA 1.005 1.001 - 1.035    Blood, Urine SMALL (A) NEGATIVE    pH, Urine 6.0 5.0 - 8.0    Protein, UA NEGATIVE NEGATIVE MG/DL    Urobilinogen, Urine NORMAL 0.2 - 1.0 MG/DL    Nitrite Urine, Quantitative NEGATIVE NEGATIVE    Leukocyte Esterase, Urine NEGATIVE NEGATIVE    RBC, UA 2 0 - 6 /HPF    WBC, UA <1 0 - 5 /HPF    Bacteria, UA RARE (A) NEGATIVE /HPF    Squam Epithel, UA 1 /HPF    Trichomonas, UA NONE SEEN NONE SEEN /HPF    Hyaline Casts, UA 2 /LPF   Magnesium   Result Value Ref Range    Magnesium 2.2 1.8 - 2.4 mg/dl   Blood Gas, Venous   Result Value Ref Range    pH, Navi 7.29 (L) 7.32 - 7.42    pCO2, Navi 71 (H) 38 - 52 mmHG    pO2, Navi 43 28 - 48 mmHG    Base Exc, Mixed 5.1 (H) 0 - 2.3    HCO3, Venous 34.1 (H) 19 - 25 MMOL/L    O2 Sat, Navi 75.6 (H) 50 - 70 %    Comment VBG    Digoxin Level   Result Value Ref Range    Digoxin Lvl <0.3 (L) 0.8 - 2.0 ng/mL    DOSE AMOUNT DOSE AMT.  GIVEN - UNKNOWN     DOSE TIME DOSE TIME GIVEN - UNKNOWN    TSH without Reflex   Result Value Ref Range    TSH, High Sensitivity 14.100 (H) 0.270 - 4.20 uIu/ml   Iron and TIBC   Result Value Ref Range    Iron 40 37 - 145 ug/dL    UIBC 221 110 - 370 ug/dL    TIBC 261 250 - 450 ug/dL    Transferrin % 15 10 - 44 %   EKG 12 Lead   Result Value Ref Range    Ventricular Rate 64 BPM    Atrial Rate 63 BPM    QRS Duration 152 ms    Q-T Interval 518 ms    QTc Calculation (Bazett) 534 ms    R Axis -70 degrees    T Axis 102 degrees    Diagnosis       Atrial fibrillation with a competing junctional time and time spent performing separately billable procedure(s) (see below for more details)  ----------------------------------------------------------                 ED COURSE & MEDICAL DECISION MAKING       Vital signs and nursing notes reviewed during ED course. Patient care and presentation staffed with supervising MD.   Patient seen by supervising MD today. All pertinent Lab data and radiographic results reviewed with patient at bedside. The patient and/or the family were informed of the results of any tests/labs/imaging, the treatment plan, and time was allotted to answer questions. Clinical  IMPRESSION    1. Generalized weakness    2. Acute on chronic congestive heart failure, unspecified heart failure type ([facility] [address]    3. Hyponatremia        Patient presents as above. Chief complaint is generalized weakness ongoing several days. Patient is answering all my questions appropriately but is generally weak on exam.  She appears very tired and rests her eyes and does drift off to sleep in between my questioning and during my evaluation. She is arousable however. On recheck she was more alert than on initial evaluation. Did discuss with her and she denies any cough or fever with regards to chest x-ray findings. She was wearing 2 L of oxygen here in the ED and was turned off her sats dropped to 88%. She does have some mild acidosis therefore BiPAP was ordered. Patient will be admitted for CHF exacerbation and hyponatremia. She was given 40 mg of Lasix here in the ED. Spoke to the hospitalist service who did graciously agreed to admit. Patient was updated on the results plan and is in agreement. Comment: Please note this report has been produced using speech recognition software and may contain errors related to that system including errors in grammar, punctuation, and spelling, as well as words and phrases that may be inappropriate.  If there are any questions or concerns please feel free to contact the dictating provider for clarification.         Alana Moore PA-C  05/01/20 [id number]

## 2020-04-30 NOTE — ED PROVIDER NOTES
the above. Questions sought and answered with the patient. They voice understanding and agree with plan. CRITICAL CARE NOTE:  There was a high probability of clinically significant life-threatening deterioration of the patient's condition requiring my urgent intervention due to pulmonary edema and respiratory failure. IV diuresis, telemetry monitoring, frequent reassessments and extensive chart review including medications and prior hospitalizations was performed to address this. Total critical care time is AT LEAST 15 minutes. This includes vital sign monitoring, pulse oximetry monitoring, telemetry monitoring, clinical response to the IV medications, reviewing the nursing notes, consultation time, dictation/documentation time, and interpretation of the lab work. This time excludes time spent performing procedures and separately billable procedures and family discussion time. All diagnostic, treatment, and disposition decisions were made by myself in conjunction with the Advanced Practice Provider. For all further details of the patient's emergency department visit, please see the Advanced Practice Provider's documentation.              Aicha Davalos MD  04/30/20 [id number]       Aicha Davalos MD  05/01/20 [id number]

## 2020-05-01 ENCOUNTER — APPOINTMENT (OUTPATIENT)
Dept: GENERAL RADIOLOGY | Age: 85
DRG: 242 | End: 2020-05-01
Payer: MEDICARE

## 2020-05-01 ENCOUNTER — APPOINTMENT (OUTPATIENT)
Dept: INTERVENTIONAL RADIOLOGY/VASCULAR | Age: 85
DRG: 242 | End: 2020-05-01
Payer: MEDICARE

## 2020-05-01 ENCOUNTER — APPOINTMENT (OUTPATIENT)
Dept: CT IMAGING | Age: 85
DRG: 242 | End: 2020-05-01
Payer: MEDICARE

## 2020-05-01 PROBLEM — Z95.0 PRESENCE OF TEMPORARY TRANSVENOUS CARDIAC PACEMAKER: Status: ACTIVE | Noted: 2020-05-01

## 2020-05-01 LAB
ANION GAP SERPL CALCULATED.3IONS-SCNC: 10 MMOL/L (ref 4–16)
APTT: 40.9 SECONDS (ref 25.1–37.1)
BASE EXCESS MIXED: 11.4 (ref 0–2.3)
BUN BLDV-MCNC: 22 MG/DL (ref 6–23)
CALCIUM SERPL-MCNC: 8.9 MG/DL (ref 8.3–10.6)
CARBON MONOXIDE, BLOOD: 2.1 % (ref 0–5)
CHLORIDE BLD-SCNC: 90 MMOL/L (ref 99–110)
CHOLESTEROL: 78 MG/DL
CO2 CONTENT: 36.4 MMOL/L (ref 19–24)
CO2: 30 MMOL/L (ref 21–32)
COMMENT: ABNORMAL
CREAT SERPL-MCNC: 0.9 MG/DL (ref 0.6–1.1)
FLUID TYPE: NORMAL INDEX
FLUID TYPE: NORMAL INDEX
GFR AFRICAN AMERICAN: >60 ML/MIN/1.73M2
GFR NON-AFRICAN AMERICAN: 60 ML/MIN/1.73M2
GLUCOSE BLD-MCNC: 122 MG/DL (ref 70–99)
HCO3 ARTERIAL: 35.1 MMOL/L (ref 18–23)
HCT VFR BLD CALC: 37.7 % (ref 37–47)
HDLC SERPL-MCNC: 32 MG/DL
HEMOGLOBIN: 11.4 GM/DL (ref 12.5–16)
INR BLD: 1.42 INDEX
IRON: 40 UG/DL (ref 37–145)
LDL CHOLESTEROL DIRECT: 33 MG/DL
LYMPHOCYTES, BODY FLUID: 17 %
LYMPHOCYTES, BODY FLUID: 33 %
MAGNESIUM: 2.3 MG/DL (ref 1.8–2.4)
MCH RBC QN AUTO: 27.9 PG (ref 27–31)
MCHC RBC AUTO-ENTMCNC: 30.2 % (ref 32–36)
MCV RBC AUTO: 92.4 FL (ref 78–100)
MESOTHELIAL FLUID: 1 /100 WBC
MESOTHELIAL FLUID: 3 /100 WBC
METHEMOGLOBIN ARTERIAL: 1.3 %
MONOCYTE, FLUID: 19 %
MONOCYTE, FLUID: 41 %
NEUTROPHIL, FLUID: 25 %
NEUTROPHIL, FLUID: 61 %
O2 SATURATION: 93.3 % (ref 96–97)
PCO2 ARTERIAL: 41 MMHG (ref 32–45)
PCT TRANSFERRIN: 15 % (ref 10–44)
PDW BLD-RTO: 17.3 % (ref 11.7–14.9)
PH BLOOD: 7.54 (ref 7.34–7.45)
PLATELET # BLD: 194 K/CU MM (ref 140–440)
PMV BLD AUTO: 10 FL (ref 7.5–11.1)
PO2 ARTERIAL: 64 MMHG (ref 75–100)
POTASSIUM SERPL-SCNC: 4.3 MMOL/L (ref 3.5–5.1)
PROTHROMBIN TIME: 17.2 SECONDS (ref 11.7–14.5)
RBC # BLD: 4.08 M/CU MM (ref 4.2–5.4)
RBC FLUID: 2000 /CU MM
RBC FLUID: 260 /CU MM
SODIUM BLD-SCNC: 130 MMOL/L (ref 135–145)
T4 FREE: 1.23 NG/DL (ref 0.9–1.8)
TOTAL IRON BINDING CAPACITY: 261 UG/DL (ref 250–450)
TRIGL SERPL-MCNC: 60 MG/DL
TSH HIGH SENSITIVITY: 14.1 UIU/ML (ref 0.27–4.2)
UNSATURATED IRON BINDING CAPACITY: 221 UG/DL (ref 110–370)
WBC # BLD: 6.9 K/CU MM (ref 4–10.5)
WBC FLUID: 243 /CU MM
WBC FLUID: 308 /CU MM

## 2020-05-01 PROCEDURE — 6360000002 HC RX W HCPCS: Performed by: INTERNAL MEDICINE

## 2020-05-01 PROCEDURE — 6370000000 HC RX 637 (ALT 250 FOR IP): Performed by: INTERNAL MEDICINE

## 2020-05-01 PROCEDURE — C1894 INTRO/SHEATH, NON-LASER: HCPCS

## 2020-05-01 PROCEDURE — 88108 CYTOPATH CONCENTRATE TECH: CPT

## 2020-05-01 PROCEDURE — 2000000000 HC ICU R&B

## 2020-05-01 PROCEDURE — 80048 BASIC METABOLIC PNL TOTAL CA: CPT

## 2020-05-01 PROCEDURE — 33210 INSERT ELECTRD/PM CATH SNGL: CPT

## 2020-05-01 PROCEDURE — 99213 OFFICE O/P EST LOW 20 MIN: CPT

## 2020-05-01 PROCEDURE — 2700000000 HC OXYGEN THERAPY PER DAY

## 2020-05-01 PROCEDURE — 71250 CT THORAX DX C-: CPT

## 2020-05-01 PROCEDURE — 85730 THROMBOPLASTIN TIME PARTIAL: CPT

## 2020-05-01 PROCEDURE — 5A1223Z PERFORMANCE OF CARDIAC PACING, CONTINUOUS: ICD-10-PCS | Performed by: INTERNAL MEDICINE

## 2020-05-01 PROCEDURE — 2709999900 HC NON-CHARGEABLE SUPPLY

## 2020-05-01 PROCEDURE — 2720000010 HC SURG SUPPLY STERILE

## 2020-05-01 PROCEDURE — 88305 TISSUE EXAM BY PATHOLOGIST: CPT

## 2020-05-01 PROCEDURE — 85610 PROTHROMBIN TIME: CPT

## 2020-05-01 PROCEDURE — 93308 TTE F-UP OR LMTD: CPT

## 2020-05-01 PROCEDURE — 6360000002 HC RX W HCPCS

## 2020-05-01 PROCEDURE — 93010 ELECTROCARDIOGRAM REPORT: CPT | Performed by: INTERNAL MEDICINE

## 2020-05-01 PROCEDURE — 71045 X-RAY EXAM CHEST 1 VIEW: CPT

## 2020-05-01 PROCEDURE — 6370000000 HC RX 637 (ALT 250 FOR IP): Performed by: NURSE PRACTITIONER

## 2020-05-01 PROCEDURE — 83735 ASSAY OF MAGNESIUM: CPT

## 2020-05-01 PROCEDURE — 94761 N-INVAS EAR/PLS OXIMETRY MLT: CPT

## 2020-05-01 PROCEDURE — 83721 ASSAY OF BLOOD LIPOPROTEIN: CPT

## 2020-05-01 PROCEDURE — 89051 BODY FLUID CELL COUNT: CPT

## 2020-05-01 PROCEDURE — 0W993ZZ DRAINAGE OF RIGHT PLEURAL CAVITY, PERCUTANEOUS APPROACH: ICD-10-PCS | Performed by: RADIOLOGY

## 2020-05-01 PROCEDURE — 92953 TEMPORARY EXTERNAL PACING: CPT

## 2020-05-01 PROCEDURE — 36600 WITHDRAWAL OF ARTERIAL BLOOD: CPT

## 2020-05-01 PROCEDURE — 94660 CPAP INITIATION&MGMT: CPT

## 2020-05-01 PROCEDURE — 85027 COMPLETE CBC AUTOMATED: CPT

## 2020-05-01 PROCEDURE — 2580000003 HC RX 258: Performed by: INTERNAL MEDICINE

## 2020-05-01 PROCEDURE — 84439 ASSAY OF FREE THYROXINE: CPT

## 2020-05-01 PROCEDURE — 0W9B3ZZ DRAINAGE OF LEFT PLEURAL CAVITY, PERCUTANEOUS APPROACH: ICD-10-PCS | Performed by: RADIOLOGY

## 2020-05-01 PROCEDURE — 82803 BLOOD GASES ANY COMBINATION: CPT

## 2020-05-01 PROCEDURE — 80061 LIPID PANEL: CPT

## 2020-05-01 PROCEDURE — 32555 ASPIRATE PLEURA W/ IMAGING: CPT

## 2020-05-01 PROCEDURE — 51702 INSERT TEMP BLADDER CATH: CPT

## 2020-05-01 PROCEDURE — 36415 COLL VENOUS BLD VENIPUNCTURE: CPT

## 2020-05-01 PROCEDURE — C1729 CATH, DRAINAGE: HCPCS

## 2020-05-01 RX ORDER — ATROPINE SULFATE 0.4 MG/ML
0.5 AMPUL (ML) INJECTION
Status: ACTIVE | OUTPATIENT
Start: 2020-05-01 | End: 2020-05-01

## 2020-05-01 RX ORDER — ACETAMINOPHEN 325 MG/1
650 TABLET ORAL EVERY 4 HOURS PRN
Status: DISCONTINUED | OUTPATIENT
Start: 2020-05-01 | End: 2020-05-02

## 2020-05-01 RX ORDER — SODIUM CHLORIDE 0.9 % (FLUSH) 0.9 %
10 SYRINGE (ML) INJECTION EVERY 12 HOURS SCHEDULED
Status: DISCONTINUED | OUTPATIENT
Start: 2020-05-01 | End: 2020-05-06 | Stop reason: HOSPADM

## 2020-05-01 RX ORDER — SODIUM CHLORIDE 9 MG/ML
INJECTION, SOLUTION INTRAVENOUS CONTINUOUS
Status: DISCONTINUED | OUTPATIENT
Start: 2020-05-01 | End: 2020-05-02

## 2020-05-01 RX ORDER — SODIUM CHLORIDE 0.9 % (FLUSH) 0.9 %
10 SYRINGE (ML) INJECTION PRN
Status: DISCONTINUED | OUTPATIENT
Start: 2020-05-01 | End: 2020-05-06 | Stop reason: HOSPADM

## 2020-05-01 RX ORDER — MORPHINE SULFATE 2 MG/ML
1 INJECTION, SOLUTION INTRAMUSCULAR; INTRAVENOUS
Status: ACTIVE | OUTPATIENT
Start: 2020-05-01 | End: 2020-05-01

## 2020-05-01 RX ADMIN — FUROSEMIDE 40 MG: 10 INJECTION, SOLUTION INTRAMUSCULAR; INTRAVENOUS at 17:55

## 2020-05-01 RX ADMIN — MIDODRINE HYDROCHLORIDE 10 MG: 5 TABLET ORAL at 17:55

## 2020-05-01 RX ADMIN — Medication 400 MG: at 14:20

## 2020-05-01 RX ADMIN — POTASSIUM CHLORIDE 10 MEQ: 1500 TABLET, EXTENDED RELEASE ORAL at 14:20

## 2020-05-01 RX ADMIN — SODIUM CHLORIDE, PRESERVATIVE FREE 10 ML: 5 INJECTION INTRAVENOUS at 21:00

## 2020-05-01 RX ADMIN — DOBUTAMINE IN DEXTROSE 2.5 MCG/KG/MIN: 200 INJECTION, SOLUTION INTRAVENOUS at 00:12

## 2020-05-01 RX ADMIN — LATANOPROST 1 DROP: 50 SOLUTION OPHTHALMIC at 21:00

## 2020-05-01 RX ADMIN — PANTOPRAZOLE SODIUM 40 MG: 40 TABLET, DELAYED RELEASE ORAL at 06:15

## 2020-05-01 RX ADMIN — ATORVASTATIN CALCIUM 10 MG: 10 TABLET, FILM COATED ORAL at 14:19

## 2020-05-01 RX ADMIN — MIDODRINE HYDROCHLORIDE 10 MG: 5 TABLET ORAL at 14:20

## 2020-05-01 RX ADMIN — COLLAGENASE SANTYL: 250 OINTMENT TOPICAL at 21:37

## 2020-05-01 RX ADMIN — SODIUM CHLORIDE, PRESERVATIVE FREE 10 ML: 5 INJECTION INTRAVENOUS at 14:21

## 2020-05-01 RX ADMIN — POTASSIUM CHLORIDE 10 MEQ: 1500 TABLET, EXTENDED RELEASE ORAL at 21:00

## 2020-05-01 ASSESSMENT — PAIN SCALES - GENERAL
PAINLEVEL_OUTOF10: 0

## 2020-05-01 NOTE — H&P
changes in bowel habit, melena or rectal bleeding  MUSCULOSKELETAL:  Denies any joint swelling, joint pain, or loss of range of motion. NEURO:  Denies any headaches, tremors, dizziness, vertigo, memory loss, confusion, weakness, numbness or tingling. PSYCH:  Denies any sleeping problems, history of abuse, marital discord. HEME/LYMPHATIC/IMMUNO:  Denies , bruising, bleeding abnormalities   ENDO:  Denies any heat or cold intolerance, panemiaolyuria or polydipsia. Objective:   No intake or output data in the 24 hours ending 04/30/20 2015   Vitals:   Vitals:    04/30/20 1941   BP:    Pulse:    Resp:    Temp:    SpO2: 92%     Physical Exam:   Gen:  awake, alert, cooperative, moderate apparent distress. Ill appearing  EYES:Lids and lashes normal, pupils equal, round ,extra ocular muscles intact, sclera clear, conjunctiva normal  ENT:  Normocephalic, oral pharynx with moist mucus membranes  NECK:  Supple, symmetrical, trachea midline, no adenopathy, JVD noted  LUNGS:  Diminished  bilaterally with crackles  noted. CARDIOVASCULAR:  irregular rate and rhythm, normal S1 and S2,no murmur noted, peripheral pulses 2+,4+ pitting edema  ABDOMEN: Normal BS, Non tender, non distended, no HSM noted. MUSCULOSKELETAL:  ROM of all extremities grossly wnl  NEUROLOGIC: AOx 3,  Cranial nerves II-XII are grossly intact. Motor is 5 out of 5 bilaterally. Sensory is intact, no lateralizing findings. SKIN:  no bruising or bleeding, normal skin color, turgor, no redness, warmth, or swelling      Past Medical History:      Past Medical History:   Diagnosis Date    Abnormal CT scan, lung 09/12/2019    Peripheral consolidation in the right lower lobe, approximately 1.8 x 1.3 cm.   There is a calcified granuloma in the right lower lobe    Arthritis     Hands, back    Diabetes mellitus (HCC)     Type II - diet controlled as of 10/2/19    Heart murmur     Dr. Aquilino Fontenot    Hyperlipidemia     Hypertension     Follows with PCP     PSHX: has a past surgical history that includes Abdominal exploration surgery ([id number]); Cholecystectomy (); Hysterectomy (); eye surgery (Bilateral, 2000 & 2009); and Tonsillectomy and adenoidectomy (). Allergies: Allergies   Allergen Reactions    Zinc     Pcn [Penicillins] Rash       FAM HX: family history includes Heart Attack in her father; Heart Disease in her father and mother; High Blood Pressure in her father and mother. Family history reviewed and is essentially negative unless as stated above.      Soc HX:   Social History     Socioeconomic History    Marital status:      Spouse name: Not on file    Number of children: Not on file    Years of education: Not on file    Highest education level: Not on file   Occupational History    Not on file   Social Needs    Financial resource strain: Not on file    Food insecurity     Worry: Not on file     Inability: Not on file    Transportation needs     Medical: Not on file     Non-medical: Not on file   Tobacco Use    Smoking status: Former Smoker     Packs/day: 1.00     Years: 59.00     Pack years: 59.00     Types: Cigarettes     Start date:      Last attempt to quit:      Years since quittin.3    Smokeless tobacco: Never Used   Substance and Sexual Activity    Alcohol use: Not Currently     Alcohol/week: 0.0 standard drinks     Comment: No alcohol since , rarely before that    Drug use: No    Sexual activity: Not on file   Lifestyle    Physical activity     Days per week: Not on file     Minutes per session: Not on file    Stress: Not on file   Relationships    Social connections     Talks on phone: Not on file     Gets together: Not on file     Attends [employer] service: Not on file     Active member of club or organization: Not on file     Attends meetings of clubs or organizations: Not on file     Relationship status: Not on file    Intimate partner violence     Fear of current or ex partner: Not on file Emotionally abused: Not on file     Physically abused: Not on file     Forced sexual activity: Not on file   Other Topics Concern    Not on file   Social History Narrative    Not on file       Electronically signed by STEPHANIE Gonzalez CNP on 4/30/2020 at 8:15 PM

## 2020-05-01 NOTE — PROGRESS NOTES
Arrived to pt's room for a bilateral thoracentesis. Pt A&O, verbalizes understanding of procedure. Pt assisted to the side of the bed, sitting, prepped and draped to the back. Dr Dillon Durbin at bedside. US images obtained. 1200 Time Out  1201 Procedure started  Dr Dillon Durbin gains access to the pleural fluid using US guidance    Right: Clear yellow pleural fluid draining. 900 ml sent to lab.  1900 ml total removed    Left: Clear brenda pleural fluid draining.  900 ml sent to lab. 1525 ml total removed    Pt tolerated procedure well  Post chest xray ordered    Report given to SAÚL SANCHEZ

## 2020-05-01 NOTE — CONSULTS
anterior wall MI. Angioplasty of the LAD done. History of heart  failure, hypertension, hyperlipidemia, history of atrial fibrillation  present, and GERD present. PAST SURGICAL HISTORY:  Angioplasty of the LAD in 11/2019, history of  gallbladder surgery, hernia repair, and tonsillectomy. SOCIAL HISTORY:  She does not smoke, does not drink. ALLERGIES:  ZINC and PENICILLIN. MEDICATIONS AT HOME:  She is on aspirin, amiodarone, Eliquis, Lopressor,  digoxin, Brilinta, and Lipitor. PHYSICAL EXAMINATION:  GENERAL:  The patient is awake and alert, answering questions. VITAL SIGNS:  Temperature is afebrile, pulse is 64, and blood pressure  is 94/53, atrial fibrillation present. HEENT:  Head is normocephalic and atraumatic. Pupils are equal and  reactive. CHEST:  Equal expansion. LUNGS:  Clear to auscultation. No wheezing or rhonchi. HEART:  Irregularly irregular. ABDOMEN:  Soft and nontender. Bowel sounds are present. EXTREMITIES:  3+ edema present. NEUROLOGIC:  Cranial nerves II through XII are grossly intact. EKG showed a junctional rhythm present. LABORATORY DATA:  BUN is 22, creatinine 0.9. BNP is 30,000. Troponin  is negative. LFTs are normal.  TSH was elevated and digoxin level was  normal.  CBC is normal.    IMPRESSION:  1. This is an 80-year-old female patient with a history of cardiogenic  shock, status post MI back in 11/2019. Had a new Impella. She survived  through that and comes in with now heart failure symptoms present. Echo  is pending at this time. I will review that. 2.  I think she probably needs a pacer or ICD. We will make the  recommendations based on the echo and EF.         Beverly Smith MD    D: 05/01/2020 9:12:16       T: 05/01/2020 9:59:08     [id number]  Job#: [id number]     Doc#: [id number]    CC:

## 2020-05-01 NOTE — PROCEDURES
[address], [address] [place] [address]                            CARDIAC CATHETERIZATION    PATIENT NAME: Miguel Mack                   :        1935  MED REC NO:   [id number]                          ROOM:       2122  ACCOUNT NO:   [de-identified]                           ADMIT DATE: 2020  PROVIDER:     David Garcia MD    DATE OF PROCEDURE:  2020    PROCEDURE PERFORMED:  Temporary pacemaker placement. INDICATION:  Bradycardia. This is an 81-year-old female patient whose heart rate went into 30s  with atrial fibrillation present. She is in heart failure also present. Therefore, temporary pacer wire was placed to help with the cardiac  output and also for bradycardia. DESCRIPTION OF PROCEDURE:  The patient was brought to cath lab. Informed consent was obtained from the patient. The patient was prepped  and draped in a sterile fashion. The patient was injected with 10 mL of  2% lidocaine in the right IJ. Using ultrasound guidance, right IJ was  canalized using a micropuncture needle, and a 6-Arabic sheath was placed  in the right IJ and the temporary pacer wire was placed though IJ. IMPRESSION:  Successful temporary pacer wire placement through the right  IJ. Heart rate set up at 80 _____ if the patient is pacing at rate of 80. No complications noted. Pacer wire was sutured in place.     BLOOD LOSS 10CC    Beverly Smith MD    D: 2020 10:51:10       T: 2020 11:30:10     [id number]  Job#: [id number]     Doc#: [id number]    CC:

## 2020-05-01 NOTE — PROGRESS NOTES
Was unable to draw ABG as patient had not been placed on Bipap since coming from ER.   Placed back on Bipap & will draw ABG in AM

## 2020-05-01 NOTE — PROGRESS NOTES
Skin check done with [facility]. Small 1x1cm open area on coccyx. Angustura in the wound bed and around the edges. Small foam piece applied.

## 2020-05-01 NOTE — CONSULTS
Years since quittin.3    Smokeless tobacco: Never Used   Substance and Sexual Activity    Alcohol use: Not Currently     Alcohol/week: 0.0 standard drinks     Comment: No alcohol since , rarely before that    Drug use: No    Sexual activity: Not on file   Lifestyle    Physical activity     Days per week: Not on file     Minutes per session: Not on file    Stress: Not on file   Relationships    Social connections     Talks on phone: Not on file     Gets together: Not on file     Attends Baptism service: Not on file     Active member of club or organization: Not on file     Attends meetings of clubs or organizations: Not on file     Relationship status: Not on file    Intimate partner violence     Fear of current or ex partner: Not on file     Emotionally abused: Not on file     Physically abused: Not on file     Forced sexual activity: Not on file   Other Topics Concern    Not on file   Social History Narrative    Not on file       Allergies   Allergen Reactions    Zinc     Pcn [Penicillins] Rash       No current facility-administered medications on file prior to encounter.       Current Outpatient Medications on File Prior to Encounter   Medication Sig Dispense Refill    aspirin 81 MG chewable tablet Take 1 tablet by mouth daily 30 tablet 3    amiodarone (CORDARONE) 200 MG tablet Take 1 tablet by mouth daily 30 tablet 3    apixaban (ELIQUIS) 2.5 MG TABS tablet Take 1 tablet by mouth 2 times daily 60 tablet 0    metoprolol tartrate (LOPRESSOR) 25 MG tablet Take 1 tablet by mouth 2 times daily 60 tablet 3    digoxin (LANOXIN) 125 MCG tablet Take 1 tablet by mouth daily 30 tablet 3    furosemide (LASIX) 20 MG tablet Take 1 tablet by mouth daily 30 tablet 1    potassium chloride (KLOR-CON M) 10 MEQ extended release tablet Take 1 tablet by mouth 2 times daily 60 tablet 3    ticagrelor (BRILINTA) 90 MG TABS tablet Take 1 tablet by mouth 2 times daily 60 tablet 1    midodrine (PROAMATINE) 10 MG tablet Take 1 tablet by mouth 3 times daily (with meals) 90 tablet 3    magnesium gluconate (MAGONATE) 500 MG tablet Take 250 mg by mouth 2 times daily      travoprost, benzalkonium, (TRAVATAN) 0.004 % ophthalmic solution Place 1 drop into both eyes nightly       pantoprazole (PROTONIX) 40 MG tablet Take 1 tablet by mouth daily 30 tablet 3    atorvastatin (LIPITOR) 10 MG tablet Take 10 mg by mouth daily         ROS: Is as noted above in HPI. Complete system review is done and is negative except as noted above. EXAM:  Blood pressure (!) 94/53, pulse 64, temperature 98.4 °F (36.9 °C), temperature source Oral, resp. rate 16, height 5' 6\" (1.676 m), weight 147 lb (66.7 kg), SpO2 98 %. General appearance: No apparent distress, appears stated age and cooperative. Skin: unremarkable  HEENT Normocephalic, atraumatic without obvious deformity. Neck: Supple, Trachea midline   Lungs: Good respiratory effort. Clear to auscultation, bilaterally  Heart: Regular rate/ rhythm   Abdomen: Soft, non-tender or non-distended   Extremities: no edema warm well perfused  Neurologic: Alert, grossly intact  Mental status: normal affect    Diagnostic Testing: Labs/EKG     IMPRESSION:  Tachy Jose    PLAN: Discussed with cardiologist who felt she wouldn't benefit from AICD. She already ate and is on Brilinta and eliquis. Will hold today's dose and restart after procedure. NPO after MN. Discussed permanent pacemaker at length including risks, benefits, and alternatives of the procedures. Decision made for surgery. All questions were answered. Patient verbalizes understanding and consents to procedure.

## 2020-05-01 NOTE — PROGRESS NOTES
Pt to ICU after temporary pacer placed. Belongings brought to room by IR nurses. Skin check completed with Yaa Cunningham RN. Pt has scattered bruising and scabbing throughout entire body surface, open area noted to coccyx and right inner buttocks. BLE red, shiny, with areas weeping serous fluid. Wound consulted fore eval & tx, Mepilex border applied.

## 2020-05-01 NOTE — PROGRESS NOTES
Nutrition Assessment    Type and Reason for Visit: Initial, Consult(nutrition assessment due david score, heart failure diet ed )    Nutrition Recommendations:   Continue cardiac diet at this time  Will offer oral nutrition supplement during stay  Encourage consistent intake     Nutrition Assessment: Admit with fatigue, edema, cardaic eval. Currently on cardiac diet, limited po data at this time. Not appropriate for diet ed on visit today. Moderate nutrition risk at this time with predicted suboptimal intake related to illness. Malnutrition Assessment:  · Malnutrition Status: Insufficient data  · Context: Chronic illness    Nutrition Risk Level: Moderate    Nutrient Needs:  · Estimated Daily Total Kcal: 5947-7328 (mifflin-st.jeor)  · Estimated Daily Protein (g): 69-81(1.2-1.4 g/kg IBW)   · Estimated Daily Total Fluid (ml/day): 1500 (1ml/aleshia)     Nutrition Diagnosis:   · Problem: Predicted suboptimal energy intake  · Etiology: related to Cardiac dysfunction     Signs and symptoms:  as evidenced by Other (Comment)(limited po data at this time )    Objective Information:  · Nutrition-Focused Physical Findings: asleep in bed on visit, nutrition david 3 adequate  · Wound Type: Open Wounds(coccyx)  · Current Nutrition Therapies:  · Oral Diet Orders: Cardiac   · Oral Diet intake: Unable to assess  · Oral Nutrition Supplement (ONS) Orders: None  · ONS intake:    · Anthropometric Measures:  · Ht: 5' 6\" (167.6 cm)   · Current Body Wt: 147 lb 0.8 oz (66.7 kg)  · Admission Body Wt: 147 lb 0.8 oz (66.7 kg)  · Usual Body Wt: (recent 140# range )  · % Weight Change:  ,  variance in weights in past months, some related to fluids   · Ideal Body Wt: 130 lb (59 kg), % Ideal Body 113  · BMI Classification: BMI 18.5 - 24.9 Normal Weight(BMI-23. 8)    Nutrition Interventions:   Continue current diet, Start ONS  Continued Inpatient Monitoring, Education not appropriate at this time, Coordination of Care    Nutrition Evaluation: · Evaluation: Goals set   · Goals: Patient will consume at least 50-75% at meals during stay     · Monitoring: Meal Intake, Supplement Intake, Pertinent Labs, Weight, Diet Tolerance, Patient/Family Education      Electronically signed by Tri Montaño RD, VIV on 5/1/20 at 4:03 PM EDT    Contact Number: [phone]

## 2020-05-01 NOTE — OP NOTE
Operative Note      Patient: Park Joiner  YOB: 1935  MRN: [id number]    Date of Procedure: 5/1/20    Pre-Op Diagnosis: bradycardia   Post-Op Diagnosis: Same       Estimated Blood Loss (mL): Minimal    Complications: None    Electronically signed by Kristin Patel MD on 5/1/2020 at 10:48 AM    Dictated --[id number]  Temporary pacer wire placed  No complications  Rate 80  Will need a PPM pacer

## 2020-05-02 ENCOUNTER — APPOINTMENT (OUTPATIENT)
Dept: GENERAL RADIOLOGY | Age: 85
DRG: 242 | End: 2020-05-02
Payer: MEDICARE

## 2020-05-02 ENCOUNTER — ANESTHESIA EVENT (OUTPATIENT)
Dept: OPERATING ROOM | Age: 85
DRG: 242 | End: 2020-05-02
Payer: MEDICARE

## 2020-05-02 ENCOUNTER — ANESTHESIA (OUTPATIENT)
Dept: OPERATING ROOM | Age: 85
DRG: 242 | End: 2020-05-02
Payer: MEDICARE

## 2020-05-02 VITALS
SYSTOLIC BLOOD PRESSURE: 106 MMHG | OXYGEN SATURATION: 96 % | RESPIRATION RATE: 18 BRPM | DIASTOLIC BLOOD PRESSURE: 55 MMHG

## 2020-05-02 LAB
ALBUMIN SERPL-MCNC: 2.1 GM/DL (ref 3.4–5)
ALP BLD-CCNC: 118 IU/L (ref 40–128)
ALT SERPL-CCNC: 18 U/L (ref 10–40)
ANION GAP SERPL CALCULATED.3IONS-SCNC: 7 MMOL/L (ref 4–16)
AST SERPL-CCNC: 20 IU/L (ref 15–37)
BILIRUB SERPL-MCNC: 0.5 MG/DL (ref 0–1)
BUN BLDV-MCNC: 19 MG/DL (ref 6–23)
CALCIUM SERPL-MCNC: 8.6 MG/DL (ref 8.3–10.6)
CHLORIDE BLD-SCNC: 92 MMOL/L (ref 99–110)
CO2: 32 MMOL/L (ref 21–32)
CREAT SERPL-MCNC: 0.8 MG/DL (ref 0.6–1.1)
GFR AFRICAN AMERICAN: >60 ML/MIN/1.73M2
GFR NON-AFRICAN AMERICAN: >60 ML/MIN/1.73M2
GLUCOSE BLD-MCNC: 96 MG/DL (ref 70–99)
MAGNESIUM: 2.1 MG/DL (ref 1.8–2.4)
POTASSIUM SERPL-SCNC: 4.2 MMOL/L (ref 3.5–5.1)
SODIUM BLD-SCNC: 131 MMOL/L (ref 135–145)
TOTAL PROTEIN: 5.1 GM/DL (ref 6.4–8.2)

## 2020-05-02 PROCEDURE — 2580000003 HC RX 258: Performed by: SURGERY

## 2020-05-02 PROCEDURE — 6360000002 HC RX W HCPCS: Performed by: SURGERY

## 2020-05-02 PROCEDURE — 6370000000 HC RX 637 (ALT 250 FOR IP): Performed by: INTERNAL MEDICINE

## 2020-05-02 PROCEDURE — 71045 X-RAY EXAM CHEST 1 VIEW: CPT

## 2020-05-02 PROCEDURE — 80053 COMPREHEN METABOLIC PANEL: CPT

## 2020-05-02 PROCEDURE — 3700000001 HC ADD 15 MINUTES (ANESTHESIA): Performed by: SURGERY

## 2020-05-02 PROCEDURE — 02HK3JZ INSERTION OF PACEMAKER LEAD INTO RIGHT VENTRICLE, PERCUTANEOUS APPROACH: ICD-10-PCS | Performed by: SURGERY

## 2020-05-02 PROCEDURE — 3600000013 HC SURGERY LEVEL 3 ADDTL 15MIN: Performed by: SURGERY

## 2020-05-02 PROCEDURE — 2580000003 HC RX 258: Performed by: NURSE ANESTHETIST, CERTIFIED REGISTERED

## 2020-05-02 PROCEDURE — C1785 PMKR, DUAL, RATE-RESP: HCPCS | Performed by: SURGERY

## 2020-05-02 PROCEDURE — 2000000000 HC ICU R&B

## 2020-05-02 PROCEDURE — 3600000003 HC SURGERY LEVEL 3 BASE: Performed by: SURGERY

## 2020-05-02 PROCEDURE — 0JH606Z INSERTION OF PACEMAKER, DUAL CHAMBER INTO CHEST SUBCUTANEOUS TISSUE AND FASCIA, OPEN APPROACH: ICD-10-PCS | Performed by: SURGERY

## 2020-05-02 PROCEDURE — 76937 US GUIDE VASCULAR ACCESS: CPT

## 2020-05-02 PROCEDURE — 2500000003 HC RX 250 WO HCPCS: Performed by: NURSE ANESTHETIST, CERTIFIED REGISTERED

## 2020-05-02 PROCEDURE — C1898 LEAD, PMKR, OTHER THAN TRANS: HCPCS | Performed by: SURGERY

## 2020-05-02 PROCEDURE — 3700000000 HC ANESTHESIA ATTENDED CARE: Performed by: SURGERY

## 2020-05-02 PROCEDURE — 2580000003 HC RX 258: Performed by: INTERNAL MEDICINE

## 2020-05-02 PROCEDURE — 7100000000 HC PACU RECOVERY - FIRST 15 MIN

## 2020-05-02 PROCEDURE — 2500000003 HC RX 250 WO HCPCS: Performed by: SURGERY

## 2020-05-02 PROCEDURE — 2709999900 HC NON-CHARGEABLE SUPPLY: Performed by: SURGERY

## 2020-05-02 PROCEDURE — 83735 ASSAY OF MAGNESIUM: CPT

## 2020-05-02 PROCEDURE — 2720000010 HC SURG SUPPLY STERILE: Performed by: SURGERY

## 2020-05-02 PROCEDURE — 76000 FLUOROSCOPY <1 HR PHYS/QHP: CPT

## 2020-05-02 PROCEDURE — 02H63JZ INSERTION OF PACEMAKER LEAD INTO RIGHT ATRIUM, PERCUTANEOUS APPROACH: ICD-10-PCS | Performed by: SURGERY

## 2020-05-02 DEVICE — LEAD PACE 6FR L53CM PLAT ALLOY/POROUS TI NITRIDE PERM R ATR: Type: IMPLANTABLE DEVICE | Site: CHEST | Status: FUNCTIONAL

## 2020-05-02 DEVICE — PACEMAKER CARD 22.5GM W50.8XH46.6MM D7.4MM TI POLYUR SIL: Type: IMPLANTABLE DEVICE | Site: CHEST | Status: FUNCTIONAL

## 2020-05-02 DEVICE — IMPLANTABLE DEVICE: Type: IMPLANTABLE DEVICE | Site: CHEST | Status: FUNCTIONAL

## 2020-05-02 RX ORDER — ACETAMINOPHEN 325 MG/1
650 TABLET ORAL EVERY 4 HOURS PRN
Status: DISCONTINUED | OUTPATIENT
Start: 2020-05-02 | End: 2020-05-06 | Stop reason: HOSPADM

## 2020-05-02 RX ORDER — VANCOMYCIN HYDROCHLORIDE 500 MG/10ML
500 INJECTION, POWDER, LYOPHILIZED, FOR SOLUTION INTRAVENOUS
Status: DISCONTINUED | OUTPATIENT
Start: 2020-05-02 | End: 2020-05-02 | Stop reason: CLARIF

## 2020-05-02 RX ORDER — METOPROLOL SUCCINATE 25 MG/1
25 TABLET, EXTENDED RELEASE ORAL DAILY
Status: DISCONTINUED | OUTPATIENT
Start: 2020-05-02 | End: 2020-05-05

## 2020-05-02 RX ORDER — SODIUM CHLORIDE 0.9 % (FLUSH) 0.9 %
10 SYRINGE (ML) INJECTION PRN
Status: DISCONTINUED | OUTPATIENT
Start: 2020-05-02 | End: 2020-05-06 | Stop reason: HOSPADM

## 2020-05-02 RX ORDER — MIDODRINE HYDROCHLORIDE 5 MG/1
5 TABLET ORAL
Status: DISCONTINUED | OUTPATIENT
Start: 2020-05-02 | End: 2020-05-02

## 2020-05-02 RX ORDER — SODIUM CHLORIDE 0.9 % (FLUSH) 0.9 %
10 SYRINGE (ML) INJECTION EVERY 12 HOURS SCHEDULED
Status: DISCONTINUED | OUTPATIENT
Start: 2020-05-02 | End: 2020-05-06 | Stop reason: HOSPADM

## 2020-05-02 RX ORDER — SODIUM CHLORIDE 9 MG/ML
INJECTION, SOLUTION INTRAVENOUS CONTINUOUS
Status: DISCONTINUED | OUTPATIENT
Start: 2020-05-02 | End: 2020-05-03

## 2020-05-02 RX ORDER — SODIUM CHLORIDE, SODIUM LACTATE, POTASSIUM CHLORIDE, CALCIUM CHLORIDE 600; 310; 30; 20 MG/100ML; MG/100ML; MG/100ML; MG/100ML
INJECTION, SOLUTION INTRAVENOUS CONTINUOUS PRN
Status: DISCONTINUED | OUTPATIENT
Start: 2020-05-02 | End: 2020-05-02 | Stop reason: SDUPTHER

## 2020-05-02 RX ORDER — KETAMINE HYDROCHLORIDE 10 MG/ML
INJECTION, SOLUTION INTRAMUSCULAR; INTRAVENOUS PRN
Status: DISCONTINUED | OUTPATIENT
Start: 2020-05-02 | End: 2020-05-02 | Stop reason: SDUPTHER

## 2020-05-02 RX ORDER — MIDODRINE HYDROCHLORIDE 5 MG/1
5 TABLET ORAL
Status: DISCONTINUED | OUTPATIENT
Start: 2020-05-02 | End: 2020-05-03

## 2020-05-02 RX ADMIN — MIDODRINE HYDROCHLORIDE 5 MG: 5 TABLET ORAL at 16:50

## 2020-05-02 RX ADMIN — COLLAGENASE SANTYL: 250 OINTMENT TOPICAL at 10:03

## 2020-05-02 RX ADMIN — SODIUM CHLORIDE: 9 INJECTION, SOLUTION INTRAVENOUS at 08:37

## 2020-05-02 RX ADMIN — VANCOMYCIN HYDROCHLORIDE 500 MG: 500 INJECTION, POWDER, LYOPHILIZED, FOR SOLUTION INTRAVENOUS at 10:40

## 2020-05-02 RX ADMIN — ASPIRIN 81 MG 81 MG: 81 TABLET ORAL at 13:21

## 2020-05-02 RX ADMIN — TICAGRELOR 90 MG: 90 TABLET ORAL at 13:21

## 2020-05-02 RX ADMIN — LATANOPROST 1 DROP: 50 SOLUTION OPHTHALMIC at 21:01

## 2020-05-02 RX ADMIN — TICAGRELOR 90 MG: 90 TABLET ORAL at 20:08

## 2020-05-02 RX ADMIN — Medication 400 MG: at 13:22

## 2020-05-02 RX ADMIN — SODIUM CHLORIDE, POTASSIUM CHLORIDE, SODIUM LACTATE AND CALCIUM CHLORIDE: 600; 310; 30; 20 INJECTION, SOLUTION INTRAVENOUS at 10:29

## 2020-05-02 RX ADMIN — ATORVASTATIN CALCIUM 10 MG: 10 TABLET, FILM COATED ORAL at 13:21

## 2020-05-02 RX ADMIN — SODIUM CHLORIDE, PRESERVATIVE FREE 10 ML: 5 INJECTION INTRAVENOUS at 20:08

## 2020-05-02 RX ADMIN — KETAMINE HYDROCHLORIDE 30 MG: 10 INJECTION INTRAMUSCULAR; INTRAVENOUS at 10:55

## 2020-05-02 RX ADMIN — POTASSIUM CHLORIDE 10 MEQ: 1500 TABLET, EXTENDED RELEASE ORAL at 13:19

## 2020-05-02 RX ADMIN — METOPROLOL SUCCINATE 25 MG: 25 TABLET, EXTENDED RELEASE ORAL at 13:20

## 2020-05-02 RX ADMIN — SODIUM CHLORIDE, PRESERVATIVE FREE 10 ML: 5 INJECTION INTRAVENOUS at 21:01

## 2020-05-02 RX ADMIN — AMIODARONE HYDROCHLORIDE 200 MG: 200 TABLET ORAL at 13:21

## 2020-05-02 RX ADMIN — MIDODRINE HYDROCHLORIDE 5 MG: 5 TABLET ORAL at 13:25

## 2020-05-02 RX ADMIN — POTASSIUM CHLORIDE 10 MEQ: 1500 TABLET, EXTENDED RELEASE ORAL at 20:09

## 2020-05-02 RX ADMIN — SODIUM CHLORIDE: 9 INJECTION, SOLUTION INTRAVENOUS at 13:00

## 2020-05-02 ASSESSMENT — PULMONARY FUNCTION TESTS
PIF_VALUE: 0
PIF_VALUE: 1
PIF_VALUE: 0
PIF_VALUE: 1
PIF_VALUE: 0
PIF_VALUE: 1
PIF_VALUE: 0

## 2020-05-02 ASSESSMENT — PAIN SCALES - GENERAL
PAINLEVEL_OUTOF10: 0

## 2020-05-02 NOTE — PROGRESS NOTES
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 Awake, alert and feeling better today  HR stable in the 90s, BP stable but low  No CP, SOB per pt. Bradycardia/ AF    Having AF with beth down into the 30s    Temp pacer in place    To have PPM placed per CTS    Still on amio    EF is 30% but do not believe ICD would help mortality- PPM    CAD s/p PCI    STEMI     S/p PCI to LAD    On brilinta    Acute on Chronic HFrEF    BNP elevated    On Lasix    On Dobutamine    Good UOP    Also has mod-severe AS- needs PPM first then will assess need for TAVR    Will cont. To follow    Echo-20  Summary   This is a limited echocardiogram.   Left ventricular systolic function is abnormal.   Ejection fraction is visually estimated at 30%. Royce-septal wall is akinetic. Severe aortic stenosis with low flow-low gradient (ALEX: 0.5 cm sq, mean P mmHg, DVI: 0.2). peak velocity is 3.58 m/sec   Trace aortic regurgitation is noted. Mitral annular calcification is present. Mild to moderate tricuspid regurgitation is present. No evidence of any pericardial effusion. Large bilateral pleural effusions. ASD/PFO is present with left to right flow.       Objective:   /68   Pulse 99   Temp 97.8 °F (36.6 °C) (Oral)   Resp 19   Ht 5' 6\" (1.676 m)   Wt 146 lb 2.6 oz (66.3 kg)   SpO2 100%   BMI 23.59 kg/m²       Intake/Output Summary (Last 24 hours) at 2020 0900  Last data filed at 2020 0615  Gross per 24 hour   Intake 4749.5 ml   Output 4025 ml   Net 724.5 ml       Medications:   Scheduled Meds:   sodium chloride flush  10 mL Intravenous 2 times

## 2020-05-02 NOTE — ANESTHESIA PRE PROCEDURE
Department of Anesthesiology  Preprocedure Note       Name:  Wiliam Nicole   Age:  81 y.o.  :  1935                                          MRN:  [id number]         Date:  2020      Surgeon: Lynn Hoyt):  Pawan Purdy MD    Procedure: PACEMAKER INSERTION PERMANENT (N/A Chest)    Medications prior to admission:   Prior to Admission medications    Medication Sig Start Date End Date Taking?  Authorizing Provider   aspirin 81 MG chewable tablet Take 1 tablet by mouth daily 19  Yes Yariel Espino MD   amiodarone (CORDARONE) 200 MG tablet Take 1 tablet by mouth daily 19  Yes Yariel Espino MD   apixaban (ELIQUIS) 2.5 MG TABS tablet Take 1 tablet by mouth 2 times daily 19  Yes Yariel Espino MD   metoprolol tartrate (LOPRESSOR) 25 MG tablet Take 1 tablet by mouth 2 times daily 19  Yes Yariel Espino MD   digoxin (LANOXIN) 125 MCG tablet Take 1 tablet by mouth daily 19  Yes Yariel Espino MD   furosemide (LASIX) 20 MG tablet Take 1 tablet by mouth daily 19  Yes Yariel Espino MD   potassium chloride (KLOR-CON M) 10 MEQ extended release tablet Take 1 tablet by mouth 2 times daily 19  Yes Yareil Espino MD   ticagrelor (BRILINTA) 90 MG TABS tablet Take 1 tablet by mouth 2 times daily 19  Yes Yariel Espino MD   midodrine (PROAMATINE) 10 MG tablet Take 1 tablet by mouth 3 times daily (with meals) 19  Yes Yariel Espino MD   magnesium gluconate (MAGONATE) 500 MG tablet Take 250 mg by mouth 2 times daily   Yes Historical Provider, MD   travoprost, benzalkonium, (TRAVATAN) 0.004 % ophthalmic solution Place 1 drop into both eyes nightly    Yes Historical Provider, MD   pantoprazole (PROTONIX) 40 MG tablet Take 1 tablet by mouth daily 9/21/15  Yes Paola Lord MD   atorvastatin (LIPITOR) 10 MG tablet Take 10 mg by mouth daily   Yes Historical Provider, MD       Current medications:    Current Facility-Administered Medications   Medication Dose Route Frequency Provider Last Rate Last Dose    0.9 % sodium chloride infusion   Intravenous Continuous Tong Hartman MD 50 mL/hr at 05/01/20 1234      sodium chloride flush 0.9 % injection 10 mL  10 mL Intravenous 2 times per day Tong Hartman MD   10 mL at 05/01/20 2100    sodium chloride flush 0.9 % injection 10 mL  10 mL Intravenous PRN Tong Hartman MD        acetaminophen (TYLENOL) tablet 650 mg  650 mg Oral Q4H PRN Tong Hartman MD        collagenase ointment   Topical Daily Thi Joel MD        amiodarone (CORDARONE) tablet 200 mg  200 mg Oral Daily Tong Hartman MD   Stopped at 05/01/20 1234    aspirin chewable tablet 81 mg  81 mg Oral Daily Tong Hartman MD   Stopped at 05/01/20 1159    atorvastatin (LIPITOR) tablet 10 mg  10 mg Oral Daily Tong Hartman MD   10 mg at 05/01/20 1419    latanoprost (XALATAN) 0.005 % ophthalmic solution 1 drop  1 drop Both Eyes Nightly Tong Hartman MD   1 drop at 05/01/20 2100    ticagrelor (BRILINTA) tablet 90 mg  90 mg Oral BID Tong Hartman MD   Stopped at 05/01/20 1158    potassium chloride (KLOR-CON M) extended release tablet 10 mEq  10 mEq Oral BID Tong Hartman MD   10 mEq at 05/01/20 2100    pantoprazole (PROTONIX) tablet 40 mg  40 mg Oral Daily Tong Hartman MD   40 mg at 05/01/20 0615    midodrine (PROAMATINE) tablet 10 mg  10 mg Oral TID WC Tong Hartman MD   10 mg at 05/01/20 1755    acetaminophen (TYLENOL) tablet 650 mg  650 mg Oral Q6H PRN Tong Hartman MD        Or   Roberta Quintero acetaminophen (TYLENOL) suppository 650 mg  650 mg Rectal Q6H PRN Tong Hartman MD        polyethylene glycol (GLYCOLAX) packet 17 g  17 g Oral Daily PRN Tong Hartman MD        promethazine (PHENERGAN) tablet 12.5 mg  12.5 mg Oral Q6H PRN Tong Hartman MD        furosemide (LASIX) injection 40 mg  40 mg Intravenous BID Tong Hartman MD   40 mg at 05/01/20 1755    magnesium oxide (MAG-OX) tablet 400

## 2020-05-02 NOTE — OP NOTE
Operative Note      Patient: Nieves Corral  YOB: 1935  MRN: [id number]    Date of Procedure: 5/2/2020    Pre-Op Diagnosis: tachy/beth   temp pacer in place    Post-Op Diagnosis: Same       Procedure(s):  PACEMAKER INSERTION PERMANENT   Remove temp pacer    Surgeon(s):  Jeremias Cueva MD    Assistant:   First Assistant: Kael Pires    Anesthesia: Monitor Anesthesia Care    Estimated Blood Loss (mL): Minimal    Complications: None    Specimens:   * No specimens in log *    Implants:  Implant Name Type Inv.  Item Serial No.  Lot No. LRB No. Used Action   LEAD BETH CAPSUREFIX NOVUS - [id number] ICD:Lead LEAD BETH CAPSUREFIX NOVUS [id number] [facility]  Left 1 Implanted   LEAD CAPSURE SENSE PACING - [id number] Pacemaker:Lead LEAD CAPSURE SENSE PACING [id number] [facility]  Left 1 Implanted   SHAHAB XT  MRI - [id number] Pacemaker:Single/Dual Chamber SHAHAB XT  MRI [id number] [facility] 1 Implanted         Drains:   Urethral Catheter Non-latex 16 fr (Active)   $ Urethral catheter insertion $ Not inserted for procedure 5/1/2020  5:44 PM   Catheter Indications Need for fluid management in critically ill patients in a critical care setting not able to be managed by other means such as BSC with hat, bedpan, urinal, condom catheter, or short term intermittent urethral catherization 5/2/2020  8:00 AM   Securement Device Date Changed 05/01/20 5/1/2020  5:44 PM   Site Assessment [place] 5/2/2020  8:00 AM   Urine Color Yellow 5/2/2020  8:00 AM   Urine Appearance Clear 5/2/2020  8:00 AM   Output (mL) 100 mL 5/2/2020  6:15 AM           Anesthesia: MAC    Preoperative Diagnosis: Acute on chronic systolic heart failure (HCC) [I50.23]  Hyponatremia [E87.1]  Generalized weakness [R53.1]  Acute on chronic congestive heart failure, unspecified heart failure type (HCC) [I50.9]  Presence of temporary transvenous cardiac pacemaker [Z95.0]  Active Hospital Problems Diagnosis Date Noted    Presence of temporary transvenous cardiac pacemaker [Z95.0] 05/01/2020    Acute on chronic systolic heart failure (HCC) [I50.23] 04/30/2020      Bradycardia with syncope    Postoperative Diagnosis: Acute on chronic systolic heart failure (HCC) [I50.23]  Hyponatremia [E87.1]  Generalized weakness [R53.1]  Acute on chronic congestive heart failure, unspecified heart failure type ([address] [I50.9]  Presence of temporary transvenous cardiac pacemaker [Z95.0]   Active Hospital Problems    Diagnosis Date Noted    Presence of temporary transvenous cardiac pacemaker [Z95.0] 05/01/2020    Acute on chronic systolic heart failure ([address] [I50.23] 04/30/2020       Operation:  Permanent Dual Chamber Pacemaker (DDD)     Details of Procedure:  Patient was brought to the operating room after request from cardiology and preoperative evaluations and discussions with patient and family. Time out per checklist confirmed. Left infraclavicular area was prepared and draped. Local anesthesia was infiltrated with 1% Lidocaine. Incision was made below the clavicle and the cephalic vein was dissected. Both atrial and ventricle leads were introduced via cephalic vein. Atrial and ventricle leads were placed in the right atrial appendage and RV apex under fluoroscopy guidance. Thresholds as recorded were obtained. There was no diaphragmatic pacing at 10V on either lead. Leads were secured to the cephalic vein and pectoralis fascia. Leads were connected to their respective locations on the Medtronic generator. The generator was placed in a pocked under the lower skin flap and anchored to the pectoralis fascia. Wound was irrigated with antibiotic solution and closed in layers. Temp pacer removed under fluro and thresholds rechecked    Blood loss was minimal. Sponge and instrument count was correct before closure.     The lead thresholds were rechecked after closure and a lateral view of the chest was obtained to

## 2020-05-02 NOTE — ANESTHESIA POSTPROCEDURE EVALUATION
Department of Anesthesiology  Postprocedure Note    Patient: Sherri Cabrera  MRN: [id number]  YOB: 1935  Date of evaluation: 5/2/2020  Time:  11:53 AM     Procedure Summary     Date:  05/02/20 Room / Location:  [address]    Anesthesia Start:  0458 Anesthesia Stop:  8477    Procedure:  PACEMAKER INSERTION PERMANENT (N/A Chest) Diagnosis:  (tachy/beth)    Surgeon:  Tej Colunga MD Responsible Provider:  STEPHANIE Mcpherson CRNA    Anesthesia Type:  MAC ASA Status:  4          Anesthesia Type: MAC    Marco Phase I:      Marco Phase II:      Last vitals: Reviewed and per EMR flowsheets.        Anesthesia Post Evaluation    Patient location during evaluation: bedside  Patient participation: complete - patient participated  Level of consciousness: awake and alert  Pain score: 0  Airway patency: patent  Nausea & Vomiting: no vomiting and no nausea  Complications: no  Cardiovascular status: blood pressure returned to baseline, hemodynamically stable and vasoactive/inotropes  Respiratory status: acceptable, spontaneous ventilation, nonlabored ventilation and nasal cannula  Hydration status: stable

## 2020-05-03 ENCOUNTER — APPOINTMENT (OUTPATIENT)
Dept: GENERAL RADIOLOGY | Age: 85
DRG: 242 | End: 2020-05-03
Payer: MEDICARE

## 2020-05-03 LAB
ALBUMIN SERPL-MCNC: 1.9 GM/DL (ref 3.4–5)
ALP BLD-CCNC: 121 IU/L (ref 40–128)
ALT SERPL-CCNC: 19 U/L (ref 10–40)
ANION GAP SERPL CALCULATED.3IONS-SCNC: 6 MMOL/L (ref 4–16)
AST SERPL-CCNC: 23 IU/L (ref 15–37)
BILIRUB SERPL-MCNC: 0.4 MG/DL (ref 0–1)
BUN BLDV-MCNC: 16 MG/DL (ref 6–23)
CALCIUM SERPL-MCNC: 8.5 MG/DL (ref 8.3–10.6)
CHLORIDE BLD-SCNC: 95 MMOL/L (ref 99–110)
CO2: 33 MMOL/L (ref 21–32)
CREAT SERPL-MCNC: 0.9 MG/DL (ref 0.6–1.1)
GFR AFRICAN AMERICAN: >60 ML/MIN/1.73M2
GFR NON-AFRICAN AMERICAN: 60 ML/MIN/1.73M2
GLUCOSE BLD-MCNC: 95 MG/DL (ref 70–99)
MAGNESIUM: 2.2 MG/DL (ref 1.8–2.4)
POTASSIUM SERPL-SCNC: 4.6 MMOL/L (ref 3.5–5.1)
PRO-BNP: 9300 PG/ML
SODIUM BLD-SCNC: 134 MMOL/L (ref 135–145)
TOTAL PROTEIN: 5 GM/DL (ref 6.4–8.2)

## 2020-05-03 PROCEDURE — 6370000000 HC RX 637 (ALT 250 FOR IP): Performed by: INTERNAL MEDICINE

## 2020-05-03 PROCEDURE — 80053 COMPREHEN METABOLIC PANEL: CPT

## 2020-05-03 PROCEDURE — 36592 COLLECT BLOOD FROM PICC: CPT

## 2020-05-03 PROCEDURE — 83880 ASSAY OF NATRIURETIC PEPTIDE: CPT

## 2020-05-03 PROCEDURE — 2580000003 HC RX 258: Performed by: SURGERY

## 2020-05-03 PROCEDURE — 6370000000 HC RX 637 (ALT 250 FOR IP): Performed by: PHYSICIAN ASSISTANT

## 2020-05-03 PROCEDURE — 2060000000 HC ICU INTERMEDIATE R&B

## 2020-05-03 PROCEDURE — 71045 X-RAY EXAM CHEST 1 VIEW: CPT

## 2020-05-03 PROCEDURE — 2580000003 HC RX 258: Performed by: INTERNAL MEDICINE

## 2020-05-03 PROCEDURE — 83735 ASSAY OF MAGNESIUM: CPT

## 2020-05-03 PROCEDURE — 6360000002 HC RX W HCPCS: Performed by: INTERNAL MEDICINE

## 2020-05-03 PROCEDURE — 2000000000 HC ICU R&B

## 2020-05-03 RX ORDER — MIDODRINE HYDROCHLORIDE 5 MG/1
10 TABLET ORAL
Status: DISCONTINUED | OUTPATIENT
Start: 2020-05-03 | End: 2020-05-06 | Stop reason: HOSPADM

## 2020-05-03 RX ORDER — FUROSEMIDE 10 MG/ML
40 INJECTION INTRAMUSCULAR; INTRAVENOUS ONCE
Status: COMPLETED | OUTPATIENT
Start: 2020-05-03 | End: 2020-05-03

## 2020-05-03 RX ORDER — CLOPIDOGREL BISULFATE 75 MG/1
75 TABLET ORAL DAILY
Status: DISCONTINUED | OUTPATIENT
Start: 2020-05-03 | End: 2020-05-06 | Stop reason: HOSPADM

## 2020-05-03 RX ADMIN — COLLAGENASE SANTYL: 250 OINTMENT TOPICAL at 09:37

## 2020-05-03 RX ADMIN — AMIODARONE HYDROCHLORIDE 200 MG: 200 TABLET ORAL at 09:38

## 2020-05-03 RX ADMIN — MIDODRINE HYDROCHLORIDE 5 MG: 5 TABLET ORAL at 09:42

## 2020-05-03 RX ADMIN — SODIUM CHLORIDE, PRESERVATIVE FREE 10 ML: 5 INJECTION INTRAVENOUS at 09:48

## 2020-05-03 RX ADMIN — FUROSEMIDE 40 MG: 10 INJECTION, SOLUTION INTRAMUSCULAR; INTRAVENOUS at 15:45

## 2020-05-03 RX ADMIN — SODIUM CHLORIDE, PRESERVATIVE FREE 10 ML: 5 INJECTION INTRAVENOUS at 20:57

## 2020-05-03 RX ADMIN — APIXABAN 2.5 MG: 2.5 TABLET, FILM COATED ORAL at 15:45

## 2020-05-03 RX ADMIN — TICAGRELOR 90 MG: 90 TABLET ORAL at 09:38

## 2020-05-03 RX ADMIN — CLOPIDOGREL BISULFATE 75 MG: 75 TABLET ORAL at 15:46

## 2020-05-03 RX ADMIN — PANTOPRAZOLE SODIUM 40 MG: 40 TABLET, DELAYED RELEASE ORAL at 09:42

## 2020-05-03 RX ADMIN — POTASSIUM CHLORIDE 10 MEQ: 1500 TABLET, EXTENDED RELEASE ORAL at 09:37

## 2020-05-03 RX ADMIN — ASPIRIN 81 MG 81 MG: 81 TABLET ORAL at 09:38

## 2020-05-03 RX ADMIN — SODIUM CHLORIDE, PRESERVATIVE FREE 10 ML: 5 INJECTION INTRAVENOUS at 20:56

## 2020-05-03 RX ADMIN — Medication 400 MG: at 09:38

## 2020-05-03 RX ADMIN — ATORVASTATIN CALCIUM 10 MG: 10 TABLET, FILM COATED ORAL at 09:38

## 2020-05-03 RX ADMIN — METOPROLOL SUCCINATE 25 MG: 25 TABLET, EXTENDED RELEASE ORAL at 09:38

## 2020-05-03 RX ADMIN — MIDODRINE HYDROCHLORIDE 10 MG: 5 TABLET ORAL at 17:24

## 2020-05-03 RX ADMIN — LATANOPROST 1 DROP: 50 SOLUTION OPHTHALMIC at 20:55

## 2020-05-03 RX ADMIN — APIXABAN 2.5 MG: 2.5 TABLET, FILM COATED ORAL at 20:55

## 2020-05-03 RX ADMIN — SACUBITRIL AND VALSARTAN 1 TABLET: 24; 26 TABLET, FILM COATED ORAL at 15:45

## 2020-05-03 ASSESSMENT — PAIN SCALES - GENERAL
PAINLEVEL_OUTOF10: 0

## 2020-05-03 NOTE — PROGRESS NOTES
Hospitalist Progress Note      Name:  Johanne Coyne /Age/Sex: 1935  (81 y.o. female)   MRN & CSN:  [id number] & [id number] Admission Date/Time: 2020  5:56 PM   Location:  -A PCP: Jose Huerta MD         Hospital Day: 4    ASSESSMENT & PLAN:   Johanne Coyne is a 81 y.o.  female who presented with worsening shortness of breath, bilateral lower extremity edema and feeling fatigued. Underwent permanent pacemaker placement on . PT/OT ordered. May need SNF placement. Patient remained the hospital.    #.  Acute on chronic systolic CHF exacerbation-- BNP 30K. Chest x-ray with bilateral pleural effusion and pulmonary edema. EF 20 to 30% in 2019.  -Lasix DC'd  -Off dobutamine drip  -Daily chemistry panel    #. Bradycardia--likely SSS. S/p permanent pacemaker on . #.  Bilateral pleural effusion--- s/p thoracentesis on .  3.425 liters of pleural fluid removed. -Follow-up pleural fluid analysis. #.  Paroxysmal A. fib-- on amiodarone, Eliquis and digoxin    #. CAD--s/p PCI with a stent to the mid LAD. -Continue Brilinta, aspirin, Lipitor    #. DM2--A1c 6.9 in 2019. Not on insulin or p.o. medications.  -Hypoglycemic protocol  - Low-dose sliding scale insulin    MEDICAL DECISION MAKING:  -Labs reviewed  -Imaging reviewed  -Level of risk moderate  Diet Dietary Nutrition Supplements: Low Calorie High Protein Supplement  DIET CARDIAC;   DVT Prophylaxis [x] Eliquis[]  Heparin, [] SCDs, [] Ambulation   GI Prophylaxis [] PPI,  [] H2 Blocker,  [] Carafate,  [] Diet/Tube Feeds   Code Status Full Code   Disposition  Home   MDM [] Low, [x] Moderate,[]  High     Chief complaint/Interval History/ROS     Chief Complaint: Shortness of breath, lower extremity swelling      INTERVAL HISTORY: Not complaining of shortness of breath so much. ROS:  No chest pain. No abdominal pain. No nausea. No vomiting. No fevers or chills.     Objective:

## 2020-05-03 NOTE — PROGRESS NOTES
changed to ARNI: No    B-blocker Yes    Persistently symptomatic AA with NYHA class III-IV  EF < 35 despite being on ACE/ ARB/ARNI: No  hydralazine + Nitrate No, d/t hypotension    Loop Diuretic No    NYHA class II-IV with eGFR >30/mil/min/173.m2 and K <5.0 mEq/l   mineralcorctcoid receptor antagonist (aldactone/ eplerenone) in addition to ACE or ARB and in conjunction with B blocker  No, d/t hypotension    HR greater than 70 with patient with LVEF  < 35 Yes  Able to use Ivabradine Yes, will attempt to titrate up BB first and if unable will attempt corlanor        Objective:   BP 95/77   Pulse 92   Temp 97.4 °F (36.3 °C) (Oral)   Resp 15   Ht 5' 6\" (1.676 m)   Wt 146 lb 2.6 oz (66.3 kg)   SpO2 100%   BMI 23.59 kg/m²       Intake/Output Summary (Last 24 hours) at 5/3/2020 1006  Last data filed at 5/3/2020 0400  Gross per 24 hour   Intake 1066 ml   Output 680 ml   Net 386 ml       Medications:   Scheduled Meds:   metoprolol succinate  25 mg Oral Daily    sodium chloride flush  10 mL Intravenous 2 times per day    midodrine  5 mg Oral TID WC    sodium chloride flush  10 mL Intravenous 2 times per day    collagenase   Topical Daily    amiodarone  200 mg Oral Daily    aspirin  81 mg Oral Daily    atorvastatin  10 mg Oral Daily    latanoprost  1 drop Both Eyes Nightly    ticagrelor  90 mg Oral BID    potassium chloride  10 mEq Oral BID    pantoprazole  40 mg Oral Daily    magnesium oxide  400 mg Oral Daily      Infusions:   sodium chloride 10 mL/hr at 05/02/20 1300      PRN Meds:  sodium chloride flush, acetaminophen, sodium chloride flush, [DISCONTINUED] acetaminophen **OR** acetaminophen, polyethylene glycol, promethazine **OR** [DISCONTINUED] ondansetron       Physical Exam:  Vitals:    05/03/20 0732   BP: 95/77   Pulse: 92   Resp: 15   Temp: 97.4 °F (36.3 °C)   SpO2: 100%        General: AAO, NAD  Chest: Nontender  Cardiac: First and Second Heart Sounds are Normal, No Murmurs or Gallops

## 2020-05-04 LAB
ALBUMIN SERPL-MCNC: 1.9 GM/DL (ref 3.4–5)
ALP BLD-CCNC: 125 IU/L (ref 40–128)
ALT SERPL-CCNC: 19 U/L (ref 10–40)
ANION GAP SERPL CALCULATED.3IONS-SCNC: 9 MMOL/L (ref 4–16)
AST SERPL-CCNC: 25 IU/L (ref 15–37)
BASOPHILS ABSOLUTE: 0 K/CU MM
BASOPHILS RELATIVE PERCENT: 0.1 % (ref 0–1)
BILIRUB SERPL-MCNC: 0.5 MG/DL (ref 0–1)
BUN BLDV-MCNC: 17 MG/DL (ref 6–23)
CALCIUM SERPL-MCNC: 8.2 MG/DL (ref 8.3–10.6)
CHLORIDE BLD-SCNC: 93 MMOL/L (ref 99–110)
CO2: 30 MMOL/L (ref 21–32)
CREAT SERPL-MCNC: 0.8 MG/DL (ref 0.6–1.1)
DIFFERENTIAL TYPE: ABNORMAL
EOSINOPHILS ABSOLUTE: 0.1 K/CU MM
EOSINOPHILS RELATIVE PERCENT: 0.7 % (ref 0–3)
GFR AFRICAN AMERICAN: >60 ML/MIN/1.73M2
GFR NON-AFRICAN AMERICAN: >60 ML/MIN/1.73M2
GLUCOSE BLD-MCNC: 94 MG/DL (ref 70–99)
HCT VFR BLD CALC: 38.3 % (ref 37–47)
HEMOGLOBIN: 11.8 GM/DL (ref 12.5–16)
IMMATURE NEUTROPHIL %: 0.7 % (ref 0–0.43)
LV EF: 25 %
LVEF MODALITY: NORMAL
LYMPHOCYTES ABSOLUTE: 0.4 K/CU MM
LYMPHOCYTES RELATIVE PERCENT: 3.5 % (ref 24–44)
MCH RBC QN AUTO: 28.7 PG (ref 27–31)
MCHC RBC AUTO-ENTMCNC: 30.8 % (ref 32–36)
MCV RBC AUTO: 93.2 FL (ref 78–100)
MONOCYTES ABSOLUTE: 0.6 K/CU MM
MONOCYTES RELATIVE PERCENT: 5.5 % (ref 0–4)
NUCLEATED RBC %: 0 %
PDW BLD-RTO: 17.9 % (ref 11.7–14.9)
PLATELET # BLD: 203 K/CU MM (ref 140–440)
PMV BLD AUTO: 10 FL (ref 7.5–11.1)
POTASSIUM SERPL-SCNC: 4.8 MMOL/L (ref 3.5–5.1)
RBC # BLD: 4.11 M/CU MM (ref 4.2–5.4)
SEGMENTED NEUTROPHILS ABSOLUTE COUNT: 9 K/CU MM
SEGMENTED NEUTROPHILS RELATIVE PERCENT: 89.5 % (ref 36–66)
SODIUM BLD-SCNC: 132 MMOL/L (ref 135–145)
TOTAL IMMATURE NEUTOROPHIL: 0.07 K/CU MM
TOTAL NUCLEATED RBC: 0 K/CU MM
TOTAL PROTEIN: 5 GM/DL (ref 6.4–8.2)
WBC # BLD: 10 K/CU MM (ref 4–10.5)

## 2020-05-04 PROCEDURE — 97166 OT EVAL MOD COMPLEX 45 MIN: CPT

## 2020-05-04 PROCEDURE — B246ZZ4 ULTRASONOGRAPHY OF RIGHT AND LEFT HEART, TRANSESOPHAGEAL: ICD-10-PCS | Performed by: INTERNAL MEDICINE

## 2020-05-04 PROCEDURE — 85025 COMPLETE CBC W/AUTO DIFF WBC: CPT

## 2020-05-04 PROCEDURE — 97530 THERAPEUTIC ACTIVITIES: CPT

## 2020-05-04 PROCEDURE — 2060000000 HC ICU INTERMEDIATE R&B

## 2020-05-04 PROCEDURE — 6370000000 HC RX 637 (ALT 250 FOR IP): Performed by: INTERNAL MEDICINE

## 2020-05-04 PROCEDURE — 93312 ECHO TRANSESOPHAGEAL: CPT

## 2020-05-04 PROCEDURE — 80053 COMPREHEN METABOLIC PANEL: CPT

## 2020-05-04 PROCEDURE — 94761 N-INVAS EAR/PLS OXIMETRY MLT: CPT

## 2020-05-04 PROCEDURE — 2000000000 HC ICU R&B

## 2020-05-04 PROCEDURE — 6370000000 HC RX 637 (ALT 250 FOR IP): Performed by: PHYSICIAN ASSISTANT

## 2020-05-04 PROCEDURE — 97163 PT EVAL HIGH COMPLEX 45 MIN: CPT

## 2020-05-04 PROCEDURE — 97116 GAIT TRAINING THERAPY: CPT

## 2020-05-04 PROCEDURE — 7100000001 HC PACU RECOVERY - ADDTL 15 MIN

## 2020-05-04 PROCEDURE — 2580000003 HC RX 258: Performed by: SURGERY

## 2020-05-04 PROCEDURE — 2700000000 HC OXYGEN THERAPY PER DAY

## 2020-05-04 PROCEDURE — 7100000000 HC PACU RECOVERY - FIRST 15 MIN

## 2020-05-04 RX ORDER — DOBUTAMINE HYDROCHLORIDE 200 MG/100ML
10 INJECTION INTRAVENOUS CONTINUOUS
Status: DISCONTINUED | OUTPATIENT
Start: 2020-05-04 | End: 2020-05-04

## 2020-05-04 RX ADMIN — Medication 400 MG: at 11:53

## 2020-05-04 RX ADMIN — APIXABAN 5 MG: 5 TABLET, FILM COATED ORAL at 20:37

## 2020-05-04 RX ADMIN — PANTOPRAZOLE SODIUM 40 MG: 40 TABLET, DELAYED RELEASE ORAL at 12:03

## 2020-05-04 RX ADMIN — LATANOPROST 1 DROP: 50 SOLUTION OPHTHALMIC at 20:39

## 2020-05-04 RX ADMIN — SODIUM CHLORIDE, PRESERVATIVE FREE 10 ML: 5 INJECTION INTRAVENOUS at 20:38

## 2020-05-04 RX ADMIN — MIDODRINE HYDROCHLORIDE 10 MG: 5 TABLET ORAL at 11:53

## 2020-05-04 RX ADMIN — CLOPIDOGREL BISULFATE 75 MG: 75 TABLET ORAL at 11:55

## 2020-05-04 RX ADMIN — APIXABAN 2.5 MG: 2.5 TABLET, FILM COATED ORAL at 11:56

## 2020-05-04 RX ADMIN — AMIODARONE HYDROCHLORIDE 200 MG: 200 TABLET ORAL at 11:55

## 2020-05-04 RX ADMIN — ATORVASTATIN CALCIUM 10 MG: 10 TABLET, FILM COATED ORAL at 11:55

## 2020-05-04 RX ADMIN — MIDODRINE HYDROCHLORIDE 10 MG: 5 TABLET ORAL at 17:46

## 2020-05-04 RX ADMIN — COLLAGENASE SANTYL: 250 OINTMENT TOPICAL at 08:57

## 2020-05-04 RX ADMIN — SACUBITRIL AND VALSARTAN 1 TABLET: 24; 26 TABLET, FILM COATED ORAL at 12:03

## 2020-05-04 ASSESSMENT — PAIN SCALES - GENERAL
PAINLEVEL_OUTOF10: 0

## 2020-05-04 NOTE — PROGRESS NOTES
[facility] -[id number]   NO CARDIOVERSION  SHE HAS A THROMBUS NOTED IN LA/DONNA  EF 20-30% RANGE  MODERATE TO SEVERE AS  CONTINUE AC

## 2020-05-04 NOTE — PROGRESS NOTES
INTERNAL MEDICINE PROGRESS NOTE        Milburn Osgood   1935   Primary Care Physician:  Selina Reaves MD  Admit Date: 4/30/2020     Subjective:   Patient is doing better at this time. She said that she ambulated little yesterday. No chest pain. No headache or dizziness. Still some swelling in the leg.      Objective:   BP (!) 80/58   Pulse 89   Temp 97.6 °F (36.4 °C) (Oral)   Resp 14   Ht 5' 6\" (1.676 m)   Wt 146 lb 2.6 oz (66.3 kg)   SpO2 98%   BMI 23.59 kg/m²    General appearance: alert, appears stated age and cooperative  Head: Normocephalic, without obvious abnormality, atraumatic  Neck: no adenopathy and supple, symmetrical, trachea midline  Lungs: clear to auscultation bilaterally  Heart: regular rate and rhythm and S1, S2 normal  Abdomen: soft, non-tender; bowel sounds normal; no masses,  no organomegaly  Extremities:  +1 edema in the leg  Neurologic: Grossly normal    Data Review  Lab Results   Component Value Date     (L) 05/04/2020    K 4.8 05/04/2020    CL 93 (L) 05/04/2020    CO2 30 05/04/2020    CREATININE 0.8 05/04/2020    BUN 17 05/04/2020    CALCIUM 8.2 (L) 05/04/2020     Lab Results   Component Value Date    WBC 10.0 05/04/2020    HGB 11.8 (L) 05/04/2020    HCT 38.3 05/04/2020    MCV 93.2 05/04/2020     05/04/2020     INR/Prothrombin Time      Meds:    midodrine  10 mg Oral TID WC    sacubitril-valsartan  1 tablet Oral QAM    apixaban  2.5 mg Oral BID    clopidogrel  75 mg Oral Daily    metoprolol succinate  25 mg Oral Daily    sodium chloride flush  10 mL Intravenous 2 times per day    sodium chloride flush  10 mL Intravenous 2 times per day    collagenase   Topical Daily    amiodarone  200 mg Oral Daily    aspirin  81 mg Oral Daily    atorvastatin  10 mg Oral Daily    latanoprost  1 drop Both Eyes Nightly    pantoprazole  40 mg Oral Daily    magnesium oxide  400 mg Oral Daily     PRN Meds: sodium chloride flush, acetaminophen, sodium chloride

## 2020-05-04 NOTE — PROGRESS NOTES
Dr. Jean Sanchez is taking over the care of this patient. Hospitalist physician will not see this patient today.

## 2020-05-04 NOTE — PROGRESS NOTES
Daily Progress Note      Remain in afib  Mohinder showed a clot so no CV  S/p PPM  Moderate to severe AS--dobutamine challenge -dobutamine-echo  --unable to increase gradient =--max mean PG 32-peak velocity 3.4ms/sec  She remains low flow low gradient AS--not sure if she would benefit with TAVR   Home tomorrow --if stable  Continue AC for afib and DONNA thrombus  afib rate controlled  BP is too low for cardio=-protective meds  Keep on Toprol/amiodarone and diureticcs  Pacer site is stable       Pt. Awake, alert and feeling ok  HR stable in the 80s, BP stable but low  No CP, SOB per pt., site stable     Bradycardia/ AF    Having AF with beth down into the 30s    PPM placed per CTS    On amio and Toprol    Site stable    PPM check showed underlying A flutter    On AC now    MOHINDER/DCCV today     CAD s/p PCI    STEMI     S/p PCI to LAD    On Plavix and AC     Acute on Chronic HFrEF    EF 30%    BNP elevated- but much improved on last check    Off Lasix now d/t hypotension and low fluid status    UOP stable    Also has mod-severe AS-will likely need further w/u    On BB, and entresto, no aldactone d/t BP    ON midodrine     Will cont. To follow     Echo-20  Summary   This is a limited echocardiogram.   Left ventricular systolic function is abnormal.   Ejection fraction is visually estimated at 30%.   Royce-septal wall is akinetic.   Severe aortic stenosis with low flow-low gradient (ALEX: 0.5 cm sq, mean P mmHg, DVI: 0.2).  peak velocity is 3.58 m/sec   Trace aortic regurgitation is noted.   Mitral annular calcification is present.   Mild to moderate tricuspid regurgitation is present.   No evidence of any pericardial effusion.   Large bilateral pleural effusions.   ASD/PFO is present with left to right flow.        ACE/ ARB: On entresto     B-blocker Yes     Persistently symptomatic AA with NYHA class III-IV  EF < 35 despite being on ACE/ ARB/ARNI: No  hydralazine + Nitrate No, d/t hypotension     Loop Diuretic No     NYHA class II-IV with eGFR >30/mil/min/173.m2 and K <5.0 mEq/l   mineralcorctcoid receptor antagonist (aldactone/ eplerenone) in addition to ACE or ARB and in conjunction with B blocker  No, d/t hypotension     HR greater than 70 with patient with LVEF  < 35 Yes  Able to use Ivabradine Would not rec. In setting of A flutter       Objective:   BP 87/62   Pulse 88   Temp 97.5 °F (36.4 °C) (Oral)   Resp 21   Ht 5' 6\" (1.676 m)   Wt 146 lb 2.6 oz (66.3 kg)   SpO2 93%   BMI 23.59 kg/m²       Intake/Output Summary (Last 24 hours) at 5/4/2020 0955  Last data filed at 5/4/2020 0400  Gross per 24 hour   Intake 480 ml   Output 2575 ml   Net -2095 ml       Medications:   Scheduled Meds:   midodrine  10 mg Oral TID WC    sacubitril-valsartan  1 tablet Oral QAM    apixaban  2.5 mg Oral BID    clopidogrel  75 mg Oral Daily    metoprolol succinate  25 mg Oral Daily    sodium chloride flush  10 mL Intravenous 2 times per day    sodium chloride flush  10 mL Intravenous 2 times per day    collagenase   Topical Daily    amiodarone  200 mg Oral Daily    aspirin  81 mg Oral Daily    atorvastatin  10 mg Oral Daily    latanoprost  1 drop Both Eyes Nightly    pantoprazole  40 mg Oral Daily    magnesium oxide  400 mg Oral Daily      Infusions:     PRN Meds:  sodium chloride flush, acetaminophen, sodium chloride flush, [DISCONTINUED] acetaminophen **OR** acetaminophen, polyethylene glycol, promethazine **OR** [DISCONTINUED] ondansetron       Physical Exam:  Vitals:    05/04/20 0901   BP: 87/62   Pulse: 88   Resp: 21   Temp:    SpO2: 93%        General: AAO, NAD  Chest: Nontender  Cardiac: First and Second Heart Sounds are Normal, No Murmurs or Gallops noted  Lungs:Clear to auscultation and percussion. Abdomen: Soft, NT, ND, +BS  Extremities: No clubbing, no edema  Vascular:  Equal 2+ peripheral pulses.         Lab Data:  CBC:   Recent Labs     05/04/20  0438   WBC 10.0   HGB 11.8*   HCT 38.3   MCV 93.2      BMP:   Recent Labs     05/02/20  0620 05/03/20  0400 05/04/20  0438   * 134* 132*   K 4.2 4.6 4.8   CL 92* 95* 93*   CO2 32 33* 30   BUN 19 16 17   CREATININE 0.8 0.9 0.8     LIVER PROFILE:   Recent Labs     05/02/20  0620 05/03/20  0400 05/04/20  0438   AST 20 23 25   ALT 18 19 19   BILITOT 0.5 0.4 0.5   ALKPHOS 118 121 125     PT/INR:   Recent Labs     05/01/20  1049   PROTIME 17.2*   INR 1.42     APTT:   Recent Labs     05/01/20  1049   APTT 40.9*     BNP:  No results for input(s): BNP in the last 72 hours.       Assessment:  Patient Active Problem List    Diagnosis Date Noted    Presence of temporary transvenous cardiac pacemaker 05/01/2020    Acute on chronic systolic heart failure ([address] 25/92/9756    Acute systolic (congestive) heart failure ([address] 11/08/2019    Acute metabolic encephalopathy 09/40/0909    Shock liver 11/08/2019    Aspiration pneumonia ([address] 11/08/2019    Acute MI ([address] 11/07/2019    ST elevation MI (STEMI) ([address] 11/06/2019    STEMI (ST elevation myocardial infarction) ([address] 11/06/2019    Pneumonia of left lower lobe due to infectious organism [facility])     Aortic valve stenosis     Type 2 diabetes mellitus without complication, without long-term current use of insulin ([address]     Moderate malnutrition ([address] 09/13/2019    Atrial flutter ([address] 09/12/2019       Electronically signed by Azalea Moise PA-C on 5/4/2020 at 9:55 AM

## 2020-05-04 NOTE — PROCEDURES
[address], [facility]                                 ECHOCARDIOGRAM    PATIENT NAME: Agnes Smith                   :        1935  MED REC NO:   [id number]                          ROOM:       2  ACCOUNT NO:   [de-identified]                           ADMIT DATE: 2020  PROVIDER:     Polina Main MD    DATE OF STUDY:  2020    PROCEDURE:  JANKI.    INDICATION:  Atrial flutter. This is an 60-year-old female patient. JANKI was performed in the  patient's room, . The patient is status post pacemaker placement  for bradycardia and JANKI was performed to rule out thrombus and  cardioversion. TECHNIQUE:  The posterior oropharynx was anesthetized using lidocaine  viscous gel and the patient received 4 mg of Versed and 25 mcg of  fentanyl. JANKI probe was advanced to the posterior pharynx and mid  esophagus. Images were obtained. FINDINGS:  Left atrium is severely enlarged. There was no clot noted in  the left atrium. A spontaneous echodensity present. There appears to  be a clot noted in the left atrial appendage. There are two clots  present, one in the appendage and one is at the tip of the appendage  itself. There is slightly mobile thrombus present. Mild-to-moderate  mitral regurgitation noted. LV cavity is severely dilated. LV  dysfunction present. EF is around 20% to 30% range present. No clot or  thrombus noted. No pericardial effusion noted. Interatrial septum is  bowing to the right, suggestive of pressure load on the left side. Pacer wire is in the right side of the heart. Right tricuspid valve is  normal.  Pulmonic valve is normal.  Aortic valve is sclerotic with  significant stenosis present and pleural effusion present. IMPRESSION:  1. There is a clot and thrombus noted in the left atrium and left  atrial appendage. 2.  Left atrium is severely enlarged.   3.  Moderate mitral regurgitation noted. 4.  EF is around 20% to 30% range present. 5.  Moderate-to-severe aortic stenosis present. 6.  Pacer wire is in the right side of the heart. 7.  There is pleural effusion noted, but no pericardial effusion noted. The JANKI probe was removed with no complications. PLAN:  1. The cardioversion has been on hold at this time because of the clot  and thrombus. Continue anticoagulation. 2.  I think her overall progress is kind of guarded with heart failure  and also aortic stenosis. I am not sure if she will be a candidate for  TAVR with her multiple comorbid conditions. We will discuss with the  team.  Further recommendations will be based on hospital course. Continue anticoagulation.         Anthony Barker MD    D: 05/04/2020 11:23:59       T: 05/04/2020 12:14:09     [email or web address]  Job#: [id number]     Doc#: [id number]    CC:

## 2020-05-05 LAB
ALBUMIN SERPL-MCNC: 2.1 GM/DL (ref 3.4–5)
ALP BLD-CCNC: 113 IU/L (ref 40–129)
ALT SERPL-CCNC: 19 U/L (ref 10–40)
ANION GAP SERPL CALCULATED.3IONS-SCNC: 8 MMOL/L (ref 4–16)
AST SERPL-CCNC: 25 IU/L (ref 15–37)
BASOPHILS ABSOLUTE: 0 K/CU MM
BASOPHILS RELATIVE PERCENT: 0.2 % (ref 0–1)
BILIRUB SERPL-MCNC: 0.6 MG/DL (ref 0–1)
BUN BLDV-MCNC: 17 MG/DL (ref 6–23)
CALCIUM SERPL-MCNC: 8.1 MG/DL (ref 8.3–10.6)
CHLORIDE BLD-SCNC: 96 MMOL/L (ref 99–110)
CO2: 30 MMOL/L (ref 21–32)
CREAT SERPL-MCNC: 0.7 MG/DL (ref 0.6–1.1)
DIFFERENTIAL TYPE: ABNORMAL
EOSINOPHILS ABSOLUTE: 0.1 K/CU MM
EOSINOPHILS RELATIVE PERCENT: 0.4 % (ref 0–3)
GFR AFRICAN AMERICAN: >60 ML/MIN/1.73M2
GFR NON-AFRICAN AMERICAN: >60 ML/MIN/1.73M2
GLUCOSE BLD-MCNC: 127 MG/DL (ref 70–99)
HCT VFR BLD CALC: 39 % (ref 37–47)
HEMOGLOBIN: 12 GM/DL (ref 12.5–16)
IMMATURE NEUTROPHIL %: 0.4 % (ref 0–0.43)
LYMPHOCYTES ABSOLUTE: 0.5 K/CU MM
LYMPHOCYTES RELATIVE PERCENT: 3.7 % (ref 24–44)
MCH RBC QN AUTO: 28.4 PG (ref 27–31)
MCHC RBC AUTO-ENTMCNC: 30.8 % (ref 32–36)
MCV RBC AUTO: 92.4 FL (ref 78–100)
MONOCYTES ABSOLUTE: 0.6 K/CU MM
MONOCYTES RELATIVE PERCENT: 4.7 % (ref 0–4)
NUCLEATED RBC %: 0 %
PDW BLD-RTO: 18.1 % (ref 11.7–14.9)
PLATELET # BLD: 269 K/CU MM (ref 140–440)
PMV BLD AUTO: 9.9 FL (ref 7.5–11.1)
POTASSIUM SERPL-SCNC: 4.5 MMOL/L (ref 3.5–5.1)
PRO-BNP: 9723 PG/ML
RBC # BLD: 4.22 M/CU MM (ref 4.2–5.4)
SEGMENTED NEUTROPHILS ABSOLUTE COUNT: 11 K/CU MM
SEGMENTED NEUTROPHILS RELATIVE PERCENT: 90.6 % (ref 36–66)
SODIUM BLD-SCNC: 134 MMOL/L (ref 135–145)
TOTAL IMMATURE NEUTOROPHIL: 0.05 K/CU MM
TOTAL NUCLEATED RBC: 0 K/CU MM
TOTAL PROTEIN: 5.1 GM/DL (ref 6.4–8.2)
WBC # BLD: 12.2 K/CU MM (ref 4–10.5)

## 2020-05-05 PROCEDURE — 85025 COMPLETE CBC W/AUTO DIFF WBC: CPT

## 2020-05-05 PROCEDURE — 80053 COMPREHEN METABOLIC PANEL: CPT

## 2020-05-05 PROCEDURE — 6370000000 HC RX 637 (ALT 250 FOR IP): Performed by: INTERNAL MEDICINE

## 2020-05-05 PROCEDURE — 36415 COLL VENOUS BLD VENIPUNCTURE: CPT

## 2020-05-05 PROCEDURE — 2580000003 HC RX 258: Performed by: SURGERY

## 2020-05-05 PROCEDURE — 2140000000 HC CCU INTERMEDIATE R&B

## 2020-05-05 PROCEDURE — 97116 GAIT TRAINING THERAPY: CPT

## 2020-05-05 PROCEDURE — 94761 N-INVAS EAR/PLS OXIMETRY MLT: CPT

## 2020-05-05 PROCEDURE — 6370000000 HC RX 637 (ALT 250 FOR IP): Performed by: PHYSICIAN ASSISTANT

## 2020-05-05 PROCEDURE — 6360000002 HC RX W HCPCS: Performed by: INTERNAL MEDICINE

## 2020-05-05 PROCEDURE — 83880 ASSAY OF NATRIURETIC PEPTIDE: CPT

## 2020-05-05 RX ORDER — FUROSEMIDE 10 MG/ML
40 INJECTION INTRAMUSCULAR; INTRAVENOUS ONCE
Status: COMPLETED | OUTPATIENT
Start: 2020-05-05 | End: 2020-05-05

## 2020-05-05 RX ORDER — METOPROLOL SUCCINATE 25 MG/1
25 TABLET, EXTENDED RELEASE ORAL 2 TIMES DAILY
Status: DISCONTINUED | OUTPATIENT
Start: 2020-05-05 | End: 2020-05-06 | Stop reason: HOSPADM

## 2020-05-05 RX ORDER — FUROSEMIDE 40 MG/1
40 TABLET ORAL DAILY
Status: DISCONTINUED | OUTPATIENT
Start: 2020-05-05 | End: 2020-05-06 | Stop reason: HOSPADM

## 2020-05-05 RX ADMIN — SODIUM CHLORIDE, PRESERVATIVE FREE 10 ML: 5 INJECTION INTRAVENOUS at 20:33

## 2020-05-05 RX ADMIN — APIXABAN 2.5 MG: 2.5 TABLET, FILM COATED ORAL at 20:33

## 2020-05-05 RX ADMIN — IVABRADINE 5 MG: 5 TABLET, FILM COATED ORAL at 13:13

## 2020-05-05 RX ADMIN — SODIUM CHLORIDE, PRESERVATIVE FREE 10 ML: 5 INJECTION INTRAVENOUS at 09:21

## 2020-05-05 RX ADMIN — COLLAGENASE SANTYL: 250 OINTMENT TOPICAL at 13:15

## 2020-05-05 RX ADMIN — Medication 400 MG: at 09:20

## 2020-05-05 RX ADMIN — METOPROLOL SUCCINATE 25 MG: 25 TABLET, EXTENDED RELEASE ORAL at 09:20

## 2020-05-05 RX ADMIN — FUROSEMIDE 40 MG: 10 INJECTION, SOLUTION INTRAMUSCULAR; INTRAVENOUS at 09:20

## 2020-05-05 RX ADMIN — LATANOPROST 1 DROP: 50 SOLUTION OPHTHALMIC at 21:06

## 2020-05-05 RX ADMIN — CLOPIDOGREL BISULFATE 75 MG: 75 TABLET ORAL at 09:20

## 2020-05-05 RX ADMIN — METOPROLOL SUCCINATE 25 MG: 25 TABLET, EXTENDED RELEASE ORAL at 21:07

## 2020-05-05 RX ADMIN — MIDODRINE HYDROCHLORIDE 10 MG: 5 TABLET ORAL at 13:12

## 2020-05-05 RX ADMIN — ATORVASTATIN CALCIUM 10 MG: 10 TABLET, FILM COATED ORAL at 09:19

## 2020-05-05 RX ADMIN — IVABRADINE 5 MG: 5 TABLET, FILM COATED ORAL at 17:41

## 2020-05-05 RX ADMIN — MIDODRINE HYDROCHLORIDE 10 MG: 5 TABLET ORAL at 09:23

## 2020-05-05 RX ADMIN — PANTOPRAZOLE SODIUM 40 MG: 40 TABLET, DELAYED RELEASE ORAL at 09:23

## 2020-05-05 RX ADMIN — MIDODRINE HYDROCHLORIDE 10 MG: 5 TABLET ORAL at 17:41

## 2020-05-05 RX ADMIN — AMIODARONE HYDROCHLORIDE 200 MG: 200 TABLET ORAL at 09:20

## 2020-05-05 ASSESSMENT — PAIN SCALES - GENERAL
PAINLEVEL_OUTOF10: 0
PAINLEVEL_OUTOF10: 0

## 2020-05-05 NOTE — PROGRESS NOTES
INTERNAL MEDICINE PROGRESS NOTE        Wiliam Nicole   1935   Primary Care Physician:  Yariel Espino MD  Admit Date: 4/30/2020     Subjective:   Patient was seen this am.   Her blood pressure is better. She does not have shortness of breath, leg swelling is about the same as yesterday. She is weak but able to work with physical therapy.        Objective:   /86   Pulse 114   Temp 97.6 °F (36.4 °C) (Oral)   Resp 29   Ht 5' 6\" (1.676 m)   Wt 145 lb 8.1 oz (66 kg)   SpO2 98%   BMI 23.48 kg/m²    General appearance: alert, appears stated age and cooperative  Head: Normocephalic, without obvious abnormality, atraumatic  Neck: no adenopathy and supple, symmetrical, trachea midline  Lungs: clear to auscultation bilaterally  Heart: regular rate and rhythm and S1, S2 normal  Abdomen: soft, non-tender; bowel sounds normal; no masses,  no organomegaly  Extremities:  +1 edema in the leg  Neurologic: Grossly normal    Data Review  Lab Results   Component Value Date     (L) 05/05/2020    K 4.5 05/05/2020    CL 96 (L) 05/05/2020    CO2 30 05/05/2020    CREATININE 0.7 05/05/2020    BUN 17 05/05/2020    CALCIUM 8.1 (L) 05/05/2020     Lab Results   Component Value Date    WBC 12.2 (H) 05/05/2020    HGB 12.0 (L) 05/05/2020    HCT 39.0 05/05/2020    MCV 92.4 05/05/2020     05/05/2020     INR/Prothrombin Time      Meds:    apixaban  2.5 mg Oral BID    furosemide  40 mg Oral Daily    ivabradine  5 mg Oral BID WC    metoprolol succinate  25 mg Oral BID    midodrine  10 mg Oral TID WC    clopidogrel  75 mg Oral Daily    sodium chloride flush  10 mL Intravenous 2 times per day    sodium chloride flush  10 mL Intravenous 2 times per day    collagenase   Topical Daily    amiodarone  200 mg Oral Daily    atorvastatin  10 mg Oral Daily    latanoprost  1 drop Both Eyes Nightly    pantoprazole  40 mg Oral Daily    magnesium oxide  400 mg Oral Daily     PRN Meds: sodium chloride flush,

## 2020-05-05 NOTE — PLAN OF CARE
Problem: Falls - Risk of:  Goal: Will remain free from falls  Description: Will remain free from falls  Outcome: Ongoing  Goal: Absence of physical injury  Description: Absence of physical injury  Outcome: Ongoing
Problem: Falls - Risk of:  Goal: Will remain free from falls  Description: Will remain free from falls  Outcome: Ongoing  Goal: Absence of physical injury  Description: Absence of physical injury  Outcome: Ongoing     Problem: Infection:  Goal: Will remain free from infection  Description: Will remain free from infection  Outcome: Ongoing     Problem: Safety:  Goal: Free from accidental physical injury  Description: Free from accidental physical injury  Outcome: Ongoing  Goal: Free from intentional harm  Description: Free from intentional harm  Outcome: Ongoing     Problem: Daily Care:  Goal: Daily care needs are met  Description: Daily care needs are met  Outcome: Ongoing     Problem: Discharge Planning:  Goal: Patients continuum of care needs are met  Description: Patients continuum of care needs are met  Outcome: Ongoing     Problem: Skin Integrity:  Goal: Skin integrity will stabilize  Description: Skin integrity will stabilize  Outcome: Ongoing     Problem: Pain:  Goal: Patient's pain/discomfort is manageable  Description: Patient's pain/discomfort is manageable  Outcome: Ongoing     Problem: Nutrition  Goal: Optimal nutrition therapy  Outcome: Ongoing
Problem: Nutrition  Goal: Optimal nutrition therapy  5/2/2020 2322 by Jabier Batista RN  Outcome: Ongoing  5/2/2020 1633 by Reyes Mires, RN  Outcome: Ongoing

## 2020-05-06 VITALS
BODY MASS INDEX: 22.92 KG/M2 | HEART RATE: 93 BPM | DIASTOLIC BLOOD PRESSURE: 71 MMHG | RESPIRATION RATE: 20 BRPM | TEMPERATURE: 97.6 F | SYSTOLIC BLOOD PRESSURE: 92 MMHG | WEIGHT: 142.6 LBS | OXYGEN SATURATION: 98 % | HEIGHT: 66 IN

## 2020-05-06 PROCEDURE — 6370000000 HC RX 637 (ALT 250 FOR IP): Performed by: PHYSICIAN ASSISTANT

## 2020-05-06 PROCEDURE — 94761 N-INVAS EAR/PLS OXIMETRY MLT: CPT

## 2020-05-06 PROCEDURE — 97530 THERAPEUTIC ACTIVITIES: CPT

## 2020-05-06 PROCEDURE — 2580000003 HC RX 258: Performed by: SURGERY

## 2020-05-06 PROCEDURE — 83880 ASSAY OF NATRIURETIC PEPTIDE: CPT

## 2020-05-06 PROCEDURE — 6370000000 HC RX 637 (ALT 250 FOR IP): Performed by: INTERNAL MEDICINE

## 2020-05-06 PROCEDURE — 97535 SELF CARE MNGMENT TRAINING: CPT

## 2020-05-06 PROCEDURE — 97116 GAIT TRAINING THERAPY: CPT

## 2020-05-06 RX ORDER — LANOLIN ALCOHOL/MO/W.PET/CERES
400 CREAM (GRAM) TOPICAL DAILY
Qty: 30 TABLET | Refills: 5 | OUTPATIENT
Start: 2020-05-07

## 2020-05-06 RX ORDER — CLOPIDOGREL BISULFATE 75 MG/1
75 TABLET ORAL DAILY
Qty: 30 TABLET | Refills: 3 | OUTPATIENT
Start: 2020-05-07

## 2020-05-06 RX ORDER — METOPROLOL SUCCINATE 25 MG/1
25 TABLET, EXTENDED RELEASE ORAL 2 TIMES DAILY
Qty: 60 TABLET | Refills: 2 | Status: SHIPPED | OUTPATIENT
Start: 2020-05-06

## 2020-05-06 RX ORDER — FUROSEMIDE 40 MG/1
40 TABLET ORAL DAILY
Qty: 60 TABLET | Refills: 3 | OUTPATIENT
Start: 2020-05-07

## 2020-05-06 RX ORDER — METOPROLOL SUCCINATE 25 MG/1
25 TABLET, EXTENDED RELEASE ORAL 2 TIMES DAILY
Qty: 30 TABLET | Refills: 3 | OUTPATIENT
Start: 2020-05-06

## 2020-05-06 RX ORDER — FUROSEMIDE 40 MG/1
40 TABLET ORAL DAILY
Qty: 30 TABLET | Refills: 3 | Status: ON HOLD | OUTPATIENT
Start: 2020-05-07 | End: 2020-05-12 | Stop reason: SDUPTHER

## 2020-05-06 RX ORDER — METOPROLOL SUCCINATE 25 MG/1
25 TABLET, EXTENDED RELEASE ORAL 2 TIMES DAILY
Qty: 30 TABLET | Refills: 3 | Status: SHIPPED | OUTPATIENT
Start: 2020-05-06 | End: 2020-05-06

## 2020-05-06 RX ADMIN — FUROSEMIDE 40 MG: 40 TABLET ORAL at 09:07

## 2020-05-06 RX ADMIN — AMIODARONE HYDROCHLORIDE 200 MG: 200 TABLET ORAL at 09:07

## 2020-05-06 RX ADMIN — MIDODRINE HYDROCHLORIDE 10 MG: 5 TABLET ORAL at 12:07

## 2020-05-06 RX ADMIN — Medication 400 MG: at 09:07

## 2020-05-06 RX ADMIN — MIDODRINE HYDROCHLORIDE 10 MG: 5 TABLET ORAL at 09:06

## 2020-05-06 RX ADMIN — PANTOPRAZOLE SODIUM 40 MG: 40 TABLET, DELAYED RELEASE ORAL at 05:42

## 2020-05-06 RX ADMIN — ATORVASTATIN CALCIUM 10 MG: 10 TABLET, FILM COATED ORAL at 09:07

## 2020-05-06 RX ADMIN — SODIUM CHLORIDE, PRESERVATIVE FREE 10 ML: 5 INJECTION INTRAVENOUS at 09:08

## 2020-05-06 RX ADMIN — APIXABAN 2.5 MG: 2.5 TABLET, FILM COATED ORAL at 09:07

## 2020-05-06 RX ADMIN — IVABRADINE 5 MG: 5 TABLET, FILM COATED ORAL at 09:08

## 2020-05-06 RX ADMIN — CLOPIDOGREL BISULFATE 75 MG: 75 TABLET ORAL at 09:07

## 2020-05-06 RX ADMIN — METOPROLOL SUCCINATE 25 MG: 25 TABLET, EXTENDED RELEASE ORAL at 09:07

## 2020-05-06 NOTE — DISCHARGE SUMMARY
Shaylee Newman MD, Internal Medicine    [address] [facility]   [phone]   Patient ID  Laura Chambers   1935  [id number]          Admit date: 4/30/2020   Discharge date: 5/6/2020      Admitting Physician: Eriberto Watkins MD   Discharge Physician: Arturo Marsh PA-C    HFrEF/Moderate AS: likely not good candidate for TAVR. Continue medical management. Acute on Chronic Hypoxic Respiratory Failure: due to CHF. Improved. BNP improved from 30,000 to 9,723. It is chronically elevated. Bradycardia/AF: S/p PPM recently. No JANKI/DCCV because clots/thrombus noted on ECHO. F/u with cardio. Hypotension: on midodrine. BP limits optimal cardiac meds for comorbid conditions. CAD s/p PCI: STEMI 11/19. On ASA and Plavix. DM2: continue present mgmt. Severe Protein Calorie Malnutrition: encouraged PO intake. Disposition: will benefit at ECF/rehab. Discharge Diagnoses:   Patient Active Problem List   Diagnosis    Atrial flutter ([address]    Moderate malnutrition ([address]    Pneumonia of left lower lobe due to infectious organism [facility])    Aortic valve stenosis    Type 2 diabetes mellitus without complication, without long-term current use of insulin ([address]    ST elevation MI (STEMI) ([address]    STEMI (ST elevation myocardial infarction) ([address]    Acute MI ([address]    Acute systolic (congestive) heart failure (HCC)    Acute metabolic encephalopathy    Shock liver    Aspiration pneumonia (HCC)    Acute on chronic systolic heart failure (HCC)    Presence of temporary transvenous cardiac pacemaker       Discharged Condition: good    Hospital Course: Patient is an 81year old female that presented to ER with worsening SOB, fatigue, and b/l LE edema for several weeks. She has history of afib, HTN, HLD, CAD, and GERD. Patient was put on diuretics and SOB and LE edema have improved. BP was low so this limited optimal cardioprotective meds - patient is on midodrine now.  She was found to have moderate to severe AS and was evaluated by cardiothoracic surgery. It was deemed the HFrEF and AS will be best managed medically as she does have multiple medical co morbidities and not great candidate for TAVR. Afib will also be medically managed with BB and AC for now because unable to CVV as clots/thrombus were noted on ECHO. Patient will go to ECF/rehab. Patient is to follow up with PCP and cardiology as outpatient.      Relevant Investigations   See HPI    Disposition: long term care facility    Patient Instructions:    Doris Perdomo   Home Medication Instructions [id number]    Printed on:05/06/20 1404   Medication Information                      amiodarone (CORDARONE) 200 MG tablet  Take 1 tablet by mouth daily             apixaban (ELIQUIS) 2.5 MG TABS tablet  Take 1 tablet by mouth 2 times daily             aspirin 81 MG chewable tablet  Take 1 tablet by mouth daily             atorvastatin (LIPITOR) 10 MG tablet  Take 10 mg by mouth daily             furosemide (LASIX) 40 MG tablet  Take 1 tablet by mouth daily             ivabradine (CORLANOR) 5 MG TABS tablet  Take 1 tablet by mouth 2 times daily (with meals)             magnesium gluconate (MAGONATE) 500 MG tablet  Take 250 mg by mouth 2 times daily             metoprolol succinate (TOPROL XL) 25 MG extended release tablet  Take 1 tablet by mouth 2 times daily             midodrine (PROAMATINE) 10 MG tablet  Take 1 tablet by mouth 3 times daily (with meals)             pantoprazole (PROTONIX) 40 MG tablet  Take 1 tablet by mouth daily             potassium chloride (KLOR-CON M) 10 MEQ extended release tablet  Take 1 tablet by mouth 2 times daily             ticagrelor (BRILINTA) 90 MG TABS tablet  Take 1 tablet by mouth 2 times daily             travoprost, benzalkonium, (TRAVATAN) 0.004 % ophthalmic solution  Place 1 drop into both eyes nightly                     Activity: activity as tolerated  Diet: cardiac diet    Signed: Electronically signed by

## 2020-05-06 NOTE — PROGRESS NOTES
Recent Labs     05/04/20  0438 05/05/20  0300   * 134*   K 4.8 4.5   CL 93* 96*   CO2 30 30   BUN 17 17   CREATININE 0.8 0.7     LIVER PROFILE:   Recent Labs     05/04/20  0438 05/05/20  0300   AST 25 25   ALT 19 19   BILITOT 0.5 0.6   ALKPHOS 125 113     PT/INR: No results for input(s): PROTIME, INR in the last 72 hours. APTT: No results for input(s): APTT in the last 72 hours. BNP:  No results for input(s): BNP in the last 72 hours.       Assessment:  Patient Active Problem List    Diagnosis Date Noted    Presence of temporary transvenous cardiac pacemaker 05/01/2020    Acute on chronic systolic heart failure ([address] 52/61/0916    Acute systolic (congestive) heart failure ([address] 11/08/2019    Acute metabolic encephalopathy 96/56/5527    Shock liver 11/08/2019    Aspiration pneumonia ([address] 11/08/2019    Acute MI ([address] 11/07/2019    ST elevation MI (STEMI) ([address] 11/06/2019    STEMI (ST elevation myocardial infarction) ([address] 11/06/2019    Pneumonia of left lower lobe due to infectious organism [facility])     Aortic valve stenosis     Type 2 diabetes mellitus without complication, without long-term current use of insulin ([address]     Moderate malnutrition ([address] 09/13/2019    Atrial flutter ([address] 09/12/2019       Electronically signed by Azalea Moise PA-C on 5/6/2020 at 10:52 AM

## 2020-05-06 NOTE — PROGRESS NOTES
during stay     · Monitoring: Meal Intake, Supplement Intake, Pertinent Labs, Weight, Diet Tolerance, Patient/Family Education      Electronically signed by Haley Tejeda RD, LD on 5/6/20 at 10:59 AM EDT    Contact Number: [phone]

## 2020-05-06 NOTE — CARE COORDINATION
Called pt's daughter for d/c planning. per her request. She informed CM that she wants her mother to go to [facility] for rehab d/t she has been there before and really enjoyed it there. Referral made to [facility] via confidential VM.  TE

## 2020-05-06 NOTE — DISCHARGE INSTR - COC
Continuity of Care Form    Patient Name: Sher Lemus   :  1935  MRN:  [id number]    Admit date:  2020  Discharge date:  20    Code Status Order: Full Code   Advance Directives:   [facility] Documentation     Date/Time Healthcare Directive Type of Healthcare Directive Copy in [facility] [address] Agent's Name Healthcare Agent's Phone Number    20 [phone]  No, patient does not have an advance directive for healthcare treatment -- -- -- -- --          Admitting Physician:  Michi Barahona MD  PCP: Luther Maxwell MD    Discharging Nurse: [address] [facility] Unit/Room#: 4346/2357-A  Discharging Unit Phone Number: [phone]    Emergency Contact:   Extended Emergency Contact Information  Primary Emergency Contact: Carl Mcdermott  Address: [address] [place] [address] Phone: [phone]  Work Phone: [phone]  Relation: Spouse  Secondary Emergency Contact: Joan Pratt  Mobile Phone: [phone]  Relation: Child   needed? No    Past Surgical History:  Past Surgical History:   Procedure Laterality Date    ABDOMINAL EXPLORATION SURGERY  [id number]    distal gastrectomy, migel en y , cholecystectomy    CHOLECYSTECTOMY  2015    EYE SURGERY Bilateral  &     cataracts    HYSTERECTOMY  1985    PACEMAKER INSERTION N/A 2020    PACEMAKER INSERTION PERMANENT performed by Herbert Del Castillo MD at [address] [place] [address]       Immunization History: There is no immunization history on file for this patient.     Active Problems:  Patient Active Problem List   Diagnosis Code    Atrial flutter (HCC) I48.92    Moderate malnutrition (HCC) E44.0    Pneumonia of left lower lobe due to infectious organism ([facility] J18.1    Aortic valve stenosis I35.0    Type 2 diabetes mellitus without complication, without long-term current use of Distance Tunneling (cm) 0 cm 5/1/2020  1:00 PM   Tunneling Position ___ O'Clock 0 5/1/2020  1:00 PM   Undermining Starts ___ O'Clock 0 5/1/2020  1:00 PM   Undermining Ends___ O'Clock 0 5/1/2020  1:00 PM   Undermining Maxium Distance (cm) 0 5/1/2020  1:00 PM   Wound Assessment Other (Comment) 5/4/2020 12:00 PM   Drainage Amount Scant 5/3/2020  4:00 AM   Drainage Description Serous 5/3/2020  4:00 AM   Odor None 5/3/2020  4:00 AM   Margins Defined edges 5/1/2020  1:00 PM   Lara-wound Assessment Edema 5/1/2020  1:00 PM   Non-staged Wound Description Full thickness 5/1/2020  1:00 PM   Poston%Wound Bed 50 5/1/2020  1:00 PM   Other%Wound Bed 50 5/1/2020  1:00 PM   Number of days: 5       Wound 05/01/20 Pretibial Right;Medial (Active)   Wound Other 5/2/2020  4:00 PM   Dressing Status Clean;Dry; Intact 5/5/2020  8:15 PM   Dressing Changed Changed/New 5/5/2020  1:16 PM   Dressing/Treatment Open to air 5/6/2020  9:23 AM   Wound Cleansed Rinsed/Irrigated with saline 5/4/2020  4:00 PM   Dressing Change Due 05/05/20 5/5/2020  8:15 PM   Wound Length (cm) 0.2 cm 5/1/2020  1:00 PM   Wound Width (cm) 0.2 cm 5/1/2020  1:00 PM   Wound Depth (cm) 0.1 cm 5/1/2020  1:00 PM   Wound Surface Area (cm^2) 0.04 cm^2 5/1/2020  1:00 PM   Wound Volume (cm^3) 0 cm^3 5/1/2020  1:00 PM   Distance Tunneling (cm) 0 cm 5/1/2020  1:00 PM   Tunneling Position ___ O'Clock 0 5/1/2020  1:00 PM   Undermining Starts ___ O'Clock 0 5/1/2020  1:00 PM   Undermining Ends___ O'Clock 0 5/1/2020  1:00 PM   Undermining Maxium Distance (cm) 0 5/1/2020  1:00 PM   Wound Assessment Other (Comment) 5/4/2020 12:00 PM   Drainage Amount Scant 5/3/2020  4:00 AM   Drainage Description Serous 5/3/2020  4:00 AM   Odor None 5/1/2020  1:00 PM   Margins Defined edges 5/1/2020  1:00 PM   Lara-wound Assessment Edema;Fragile 5/1/2020  1:00 PM   Non-staged Wound Description Full thickness 5/1/2020  1:00 PM   Poston%Wound Bed 50 5/1/2020  1:00 PM   Other%Wound Bed 50 5/1/2020  1:00 PM SI:[id number]}  Urinary Catheter: {Urinary Catheter:[id number]}   Colostomy/Ileostomy/Ileal Conduit: No       Date of Last BM: 5/6/2020    Intake/Output Summary (Last 24 hours) at 5/6/2020 1458  Last data filed at 5/5/2020 2153  Gross per 24 hour   Intake 10 ml   Output 150 ml   Net -140 ml     I/O last 3 completed shifts: In: 10 [I.V.:10]  Out: [place] [Urine:1550]    Safety Concerns: At Risk for Falls    Impairments/Disabilities:      Hearing    Patient's personal belongings (please select all that are sent with patient):  Sarah  RN SIGNATURE:  Electronically signed by David Forte RN on 5/6/20 at 2:47 PM EDT  CASE MANAGEMENT/SOCIAL WORK SECTION    Inpatient Status Date: ***    Readmission Risk Assessment Score:  Readmission Risk              Risk of Unplanned Readmission:        28           Discharging to Facility/ Agency   · Name: Darren Oconnell  · Address: [facility]  · Phone: [phone]  · Fax [phone]    PHYSICIAN SECTION    Nutrition Therapy:  Current Nutrition Therapy:   - Oral Diet:  General diet    Routes of Feeding: Oral  Liquids: No Restrictions  Daily Fluid Restriction: No  Last Modified Barium Swallow with Video (Video Swallowing Test): not done    Treatments at the Time of Hospital Discharge:   Respiratory Treatments: n/a  Oxygen Therapy:  is not on home oxygen therapy. Ventilator:    - No ventilator support    Rehab Therapies: Physical Therapy, OT   Weight Bearing Status/Restrictions: No weight bearing restirctions  Other Medical Equipment (for information only, NOT a DME order):  wheelchair  Other Treatments:           Prognosis: Guarded    Condition at Discharge: Stable    Rehab Potential (if transferring to Rehab): Fair    Recommended Labs or Other Treatments After Discharge:   BMP once weekly for three weeks. Follow up with Dr. Thelma Daniel in 2 weeks. Daily weights.     Physician Certification: I certify the above information and transfer of Alexander Barillas  is necessary for the continuing treatment of the diagnosis listed and that she requires Assisted Living for greater 30 days.      Update Admission H&P: No change in H&P    PHYSICIAN SIGNATURE:  Electronically signed by Gayle Wing PA-C on 5/6/20 at 2:26 PM EDT

## 2020-05-07 LAB
EKG ATRIAL RATE: 63 BPM
EKG DIAGNOSIS: NORMAL
EKG Q-T INTERVAL: 518 MS
EKG QRS DURATION: 152 MS
EKG QTC CALCULATION (BAZETT): 534 MS
EKG R AXIS: -70 DEGREES
EKG T AXIS: 102 DEGREES
EKG VENTRICULAR RATE: 64 BPM

## 2020-05-11 ENCOUNTER — HOSPITAL ENCOUNTER (INPATIENT)
Age: 85
LOS: 1 days | Discharge: NURSING, REHAB OR LONG-TERM CARE | DRG: 291 | End: 2020-05-12
Attending: EMERGENCY MEDICINE | Admitting: GENERAL PRACTICE
Payer: MEDICARE

## 2020-05-11 ENCOUNTER — APPOINTMENT (OUTPATIENT)
Dept: GENERAL RADIOLOGY | Age: 85
DRG: 291 | End: 2020-05-11
Payer: MEDICARE

## 2020-05-11 PROBLEM — I50.9 ACUTE HEART FAILURE (HCC): Status: ACTIVE | Noted: 2020-05-11

## 2020-05-11 LAB
ALBUMIN SERPL-MCNC: 2.5 GM/DL (ref 3.4–5)
ALP BLD-CCNC: 142 IU/L (ref 40–128)
ALT SERPL-CCNC: 27 U/L (ref 10–40)
ANION GAP SERPL CALCULATED.3IONS-SCNC: 10 MMOL/L (ref 4–16)
APTT: 57.2 SECONDS (ref 25.1–37.1)
AST SERPL-CCNC: 27 IU/L (ref 15–37)
BASOPHILS ABSOLUTE: 0 K/CU MM
BASOPHILS RELATIVE PERCENT: 0.2 % (ref 0–1)
BILIRUB SERPL-MCNC: 0.7 MG/DL (ref 0–1)
BUN BLDV-MCNC: 26 MG/DL (ref 6–23)
CALCIUM SERPL-MCNC: 9.6 MG/DL (ref 8.3–10.6)
CHLORIDE BLD-SCNC: 94 MMOL/L (ref 99–110)
CO2: 31 MMOL/L (ref 21–32)
CREAT SERPL-MCNC: 1 MG/DL (ref 0.6–1.1)
DIFFERENTIAL TYPE: ABNORMAL
EOSINOPHILS ABSOLUTE: 0 K/CU MM
EOSINOPHILS RELATIVE PERCENT: 0.3 % (ref 0–3)
GFR AFRICAN AMERICAN: >60 ML/MIN/1.73M2
GFR NON-AFRICAN AMERICAN: 53 ML/MIN/1.73M2
GLUCOSE BLD-MCNC: 116 MG/DL (ref 70–99)
GLUCOSE BLD-MCNC: 143 MG/DL (ref 70–99)
GLUCOSE BLD-MCNC: 148 MG/DL (ref 70–99)
GLUCOSE BLD-MCNC: 198 MG/DL (ref 70–99)
GLUCOSE BLD-MCNC: 84 MG/DL (ref 70–99)
HCT VFR BLD CALC: 36.3 % (ref 37–47)
HEMOGLOBIN: 11.1 GM/DL (ref 12.5–16)
IMMATURE NEUTROPHIL %: 0.5 % (ref 0–0.43)
INR BLD: 1.64 INDEX
LACTATE: 2 MMOL/L (ref 0.4–2)
LYMPHOCYTES ABSOLUTE: 0.6 K/CU MM
LYMPHOCYTES RELATIVE PERCENT: 4.1 % (ref 24–44)
MCH RBC QN AUTO: 28.2 PG (ref 27–31)
MCHC RBC AUTO-ENTMCNC: 30.6 % (ref 32–36)
MCV RBC AUTO: 92.1 FL (ref 78–100)
MONOCYTES ABSOLUTE: 0.6 K/CU MM
MONOCYTES RELATIVE PERCENT: 4.4 % (ref 0–4)
NUCLEATED RBC %: 0 %
PDW BLD-RTO: 17.3 % (ref 11.7–14.9)
PLATELET # BLD: 299 K/CU MM (ref 140–440)
PMV BLD AUTO: 9.9 FL (ref 7.5–11.1)
POTASSIUM SERPL-SCNC: 4.9 MMOL/L (ref 3.5–5.1)
PRO-BNP: ABNORMAL PG/ML
PROTHROMBIN TIME: 20 SECONDS (ref 11.7–14.5)
RBC # BLD: 3.94 M/CU MM (ref 4.2–5.4)
SEGMENTED NEUTROPHILS ABSOLUTE COUNT: 12 K/CU MM
SEGMENTED NEUTROPHILS RELATIVE PERCENT: 90.5 % (ref 36–66)
SODIUM BLD-SCNC: 135 MMOL/L (ref 135–145)
TOTAL IMMATURE NEUTOROPHIL: 0.06 K/CU MM
TOTAL NUCLEATED RBC: 0 K/CU MM
TOTAL PROTEIN: 7 GM/DL (ref 6.4–8.2)
TROPONIN T: <0.01 NG/ML
WBC # BLD: 13.3 K/CU MM (ref 4–10.5)

## 2020-05-11 PROCEDURE — 84484 ASSAY OF TROPONIN QUANT: CPT

## 2020-05-11 PROCEDURE — 71045 X-RAY EXAM CHEST 1 VIEW: CPT

## 2020-05-11 PROCEDURE — 6370000000 HC RX 637 (ALT 250 FOR IP): Performed by: GENERAL PRACTICE

## 2020-05-11 PROCEDURE — 94761 N-INVAS EAR/PLS OXIMETRY MLT: CPT

## 2020-05-11 PROCEDURE — 6370000000 HC RX 637 (ALT 250 FOR IP): Performed by: UROLOGY

## 2020-05-11 PROCEDURE — 2580000003 HC RX 258: Performed by: GENERAL PRACTICE

## 2020-05-11 PROCEDURE — 2060000000 HC ICU INTERMEDIATE R&B

## 2020-05-11 PROCEDURE — 94660 CPAP INITIATION&MGMT: CPT

## 2020-05-11 PROCEDURE — 82962 GLUCOSE BLOOD TEST: CPT

## 2020-05-11 PROCEDURE — 85730 THROMBOPLASTIN TIME PARTIAL: CPT

## 2020-05-11 PROCEDURE — 2700000000 HC OXYGEN THERAPY PER DAY

## 2020-05-11 PROCEDURE — 6360000002 HC RX W HCPCS: Performed by: INTERNAL MEDICINE

## 2020-05-11 PROCEDURE — 93010 ELECTROCARDIOGRAM REPORT: CPT | Performed by: INTERNAL MEDICINE

## 2020-05-11 PROCEDURE — 83605 ASSAY OF LACTIC ACID: CPT

## 2020-05-11 PROCEDURE — 96374 THER/PROPH/DIAG INJ IV PUSH: CPT

## 2020-05-11 PROCEDURE — 6360000002 HC RX W HCPCS: Performed by: EMERGENCY MEDICINE

## 2020-05-11 PROCEDURE — 83880 ASSAY OF NATRIURETIC PEPTIDE: CPT

## 2020-05-11 PROCEDURE — 99285 EMERGENCY DEPT VISIT HI MDM: CPT

## 2020-05-11 PROCEDURE — 80053 COMPREHEN METABOLIC PANEL: CPT

## 2020-05-11 PROCEDURE — 85610 PROTHROMBIN TIME: CPT

## 2020-05-11 PROCEDURE — 85025 COMPLETE CBC W/AUTO DIFF WBC: CPT

## 2020-05-11 PROCEDURE — 99213 OFFICE O/P EST LOW 20 MIN: CPT

## 2020-05-11 PROCEDURE — 93005 ELECTROCARDIOGRAM TRACING: CPT | Performed by: EMERGENCY MEDICINE

## 2020-05-11 RX ORDER — MIDODRINE HYDROCHLORIDE 5 MG/1
10 TABLET ORAL
Status: DISCONTINUED | OUTPATIENT
Start: 2020-05-11 | End: 2020-05-12 | Stop reason: HOSPADM

## 2020-05-11 RX ORDER — DEXTROSE MONOHYDRATE 25 G/50ML
12.5 INJECTION, SOLUTION INTRAVENOUS PRN
Status: DISCONTINUED | OUTPATIENT
Start: 2020-05-11 | End: 2020-05-12 | Stop reason: HOSPADM

## 2020-05-11 RX ORDER — FUROSEMIDE 10 MG/ML
40 INJECTION INTRAMUSCULAR; INTRAVENOUS ONCE
Status: COMPLETED | OUTPATIENT
Start: 2020-05-11 | End: 2020-05-11

## 2020-05-11 RX ORDER — LATANOPROST 50 UG/ML
1 SOLUTION/ DROPS OPHTHALMIC NIGHTLY
Status: DISCONTINUED | OUTPATIENT
Start: 2020-05-11 | End: 2020-05-12 | Stop reason: HOSPADM

## 2020-05-11 RX ORDER — ATORVASTATIN CALCIUM 10 MG/1
10 TABLET, FILM COATED ORAL DAILY
Status: DISCONTINUED | OUTPATIENT
Start: 2020-05-11 | End: 2020-05-12 | Stop reason: HOSPADM

## 2020-05-11 RX ORDER — SODIUM CHLORIDE 0.9 % (FLUSH) 0.9 %
10 SYRINGE (ML) INJECTION EVERY 12 HOURS SCHEDULED
Status: DISCONTINUED | OUTPATIENT
Start: 2020-05-11 | End: 2020-05-12 | Stop reason: HOSPADM

## 2020-05-11 RX ORDER — FUROSEMIDE 10 MG/ML
20 INJECTION INTRAMUSCULAR; INTRAVENOUS 2 TIMES DAILY
Status: DISCONTINUED | OUTPATIENT
Start: 2020-05-11 | End: 2020-05-11

## 2020-05-11 RX ORDER — DEXTROSE MONOHYDRATE 50 MG/ML
100 INJECTION, SOLUTION INTRAVENOUS PRN
Status: DISCONTINUED | OUTPATIENT
Start: 2020-05-11 | End: 2020-05-12 | Stop reason: HOSPADM

## 2020-05-11 RX ORDER — LANOLIN ALCOHOL/MO/W.PET/CERES
200 CREAM (GRAM) TOPICAL DAILY
Status: DISCONTINUED | OUTPATIENT
Start: 2020-05-11 | End: 2020-05-12 | Stop reason: HOSPADM

## 2020-05-11 RX ORDER — FUROSEMIDE 10 MG/ML
40 INJECTION INTRAMUSCULAR; INTRAVENOUS 3 TIMES DAILY
Status: DISCONTINUED | OUTPATIENT
Start: 2020-05-11 | End: 2020-05-12 | Stop reason: HOSPADM

## 2020-05-11 RX ORDER — POTASSIUM CHLORIDE 20 MEQ/1
10 TABLET, EXTENDED RELEASE ORAL 2 TIMES DAILY
Status: DISCONTINUED | OUTPATIENT
Start: 2020-05-11 | End: 2020-05-12 | Stop reason: HOSPADM

## 2020-05-11 RX ORDER — ACETAMINOPHEN 325 MG/1
650 TABLET ORAL EVERY 4 HOURS PRN
Status: DISCONTINUED | OUTPATIENT
Start: 2020-05-11 | End: 2020-05-12 | Stop reason: HOSPADM

## 2020-05-11 RX ORDER — NICOTINE POLACRILEX 4 MG
15 LOZENGE BUCCAL PRN
Status: DISCONTINUED | OUTPATIENT
Start: 2020-05-11 | End: 2020-05-12 | Stop reason: HOSPADM

## 2020-05-11 RX ORDER — METOPROLOL SUCCINATE 25 MG/1
25 TABLET, EXTENDED RELEASE ORAL 2 TIMES DAILY
Status: DISCONTINUED | OUTPATIENT
Start: 2020-05-11 | End: 2020-05-12 | Stop reason: HOSPADM

## 2020-05-11 RX ORDER — SODIUM CHLORIDE 0.9 % (FLUSH) 0.9 %
10 SYRINGE (ML) INJECTION PRN
Status: DISCONTINUED | OUTPATIENT
Start: 2020-05-11 | End: 2020-05-12 | Stop reason: HOSPADM

## 2020-05-11 RX ORDER — MAGNESIUM GLUCONATE 27 MG(500)
250 TABLET ORAL 2 TIMES DAILY
Status: DISCONTINUED | OUTPATIENT
Start: 2020-05-11 | End: 2020-05-11 | Stop reason: CLARIF

## 2020-05-11 RX ORDER — PANTOPRAZOLE SODIUM 40 MG/1
40 TABLET, DELAYED RELEASE ORAL DAILY
Status: DISCONTINUED | OUTPATIENT
Start: 2020-05-11 | End: 2020-05-12 | Stop reason: HOSPADM

## 2020-05-11 RX ORDER — AMIODARONE HYDROCHLORIDE 200 MG/1
200 TABLET ORAL DAILY
Status: DISCONTINUED | OUTPATIENT
Start: 2020-05-11 | End: 2020-05-12 | Stop reason: HOSPADM

## 2020-05-11 RX ADMIN — FUROSEMIDE 40 MG: 10 INJECTION, SOLUTION INTRAMUSCULAR; INTRAVENOUS at 09:09

## 2020-05-11 RX ADMIN — MIDODRINE HYDROCHLORIDE 10 MG: 5 TABLET ORAL at 09:14

## 2020-05-11 RX ADMIN — POTASSIUM CHLORIDE 10 MEQ: 1500 TABLET, EXTENDED RELEASE ORAL at 21:31

## 2020-05-11 RX ADMIN — IVABRADINE 5 MG: 5 TABLET, FILM COATED ORAL at 09:14

## 2020-05-11 RX ADMIN — IVABRADINE 5 MG: 5 TABLET, FILM COATED ORAL at 17:50

## 2020-05-11 RX ADMIN — ATORVASTATIN CALCIUM 10 MG: 10 TABLET, FILM COATED ORAL at 09:08

## 2020-05-11 RX ADMIN — INSULIN LISPRO 1 UNITS: 100 INJECTION, SOLUTION INTRAVENOUS; SUBCUTANEOUS at 13:15

## 2020-05-11 RX ADMIN — LATANOPROST 1 DROP: 50 SOLUTION OPHTHALMIC at 21:43

## 2020-05-11 RX ADMIN — MIDODRINE HYDROCHLORIDE 10 MG: 5 TABLET ORAL at 17:50

## 2020-05-11 RX ADMIN — SODIUM CHLORIDE, PRESERVATIVE FREE 10 ML: 5 INJECTION INTRAVENOUS at 21:32

## 2020-05-11 RX ADMIN — PANTOPRAZOLE SODIUM 40 MG: 40 TABLET, DELAYED RELEASE ORAL at 09:08

## 2020-05-11 RX ADMIN — FUROSEMIDE 40 MG: 10 INJECTION, SOLUTION INTRAMUSCULAR; INTRAVENOUS at 05:32

## 2020-05-11 RX ADMIN — METOPROLOL SUCCINATE 25 MG: 25 TABLET, EXTENDED RELEASE ORAL at 21:31

## 2020-05-11 RX ADMIN — FUROSEMIDE 40 MG: 10 INJECTION, SOLUTION INTRAMUSCULAR; INTRAVENOUS at 21:30

## 2020-05-11 RX ADMIN — APIXABAN 2.5 MG: 2.5 TABLET, FILM COATED ORAL at 09:09

## 2020-05-11 RX ADMIN — Medication 200 MG: at 09:08

## 2020-05-11 RX ADMIN — POTASSIUM CHLORIDE 10 MEQ: 1500 TABLET, EXTENDED RELEASE ORAL at 09:09

## 2020-05-11 RX ADMIN — METOPROLOL SUCCINATE 25 MG: 25 TABLET, EXTENDED RELEASE ORAL at 09:08

## 2020-05-11 RX ADMIN — FUROSEMIDE 40 MG: 10 INJECTION, SOLUTION INTRAMUSCULAR; INTRAVENOUS at 13:20

## 2020-05-11 RX ADMIN — TICAGRELOR 90 MG: 90 TABLET ORAL at 21:31

## 2020-05-11 RX ADMIN — INSULIN LISPRO 1 UNITS: 100 INJECTION, SOLUTION INTRAVENOUS; SUBCUTANEOUS at 09:10

## 2020-05-11 RX ADMIN — SODIUM CHLORIDE, PRESERVATIVE FREE 10 ML: 5 INJECTION INTRAVENOUS at 09:07

## 2020-05-11 RX ADMIN — COLLAGENASE SANTYL: 250 OINTMENT TOPICAL at 17:50

## 2020-05-11 RX ADMIN — MIDODRINE HYDROCHLORIDE 10 MG: 5 TABLET ORAL at 13:20

## 2020-05-11 RX ADMIN — TICAGRELOR 90 MG: 90 TABLET ORAL at 09:08

## 2020-05-11 RX ADMIN — APIXABAN 2.5 MG: 2.5 TABLET, FILM COATED ORAL at 21:31

## 2020-05-11 RX ADMIN — AMIODARONE HYDROCHLORIDE 200 MG: 200 TABLET ORAL at 09:08

## 2020-05-11 ASSESSMENT — PAIN SCALES - GENERAL
PAINLEVEL_OUTOF10: 0

## 2020-05-11 NOTE — CONSULTS
severe bradycardia, now she has  permanent pacemaker placement present. 2.  She has atrial fibrillation. 3.  She has moderate aortic stenosis noted with low flow and low  gradient aortic stenosis noted. The patient had a heart catheterization done back in 11/2019 and she was  found to have STEMI present, LAD was occluded and she developed  significant myocardial infarction with anterior wall with significant LV  dysfunction noted. She had history of cardiogenic shock back in 11/2019  leading to significant heart failure systolic heart failure present with  anterior wall dysfunction. Angioplasty of the LAD done back in 11/2019,  history of moderate to severe aortic stenosis noted, hypertension,  hyperlipidemia, atrial fibrillation, tachybrady syndrome status post  pacemaker placement and GERD. PAST SURGICAL HISTORY:  Angioplasty of the LAD in 11/2019, history of  gallbladder surgery, hernia repair, tonsillectomy, pacemaker placement  in 05/2020. SOCIAL HISTORY:  Does not smoke, does not drink. She came from nursing  home. ALLERGIES: ZINC and PENICILLIN. MEDICATIONS AT HOME:  She is on _____ 5 mg b.i.d., Lasix 40 mg once a  day, Toprol 25 mg b.i.d., aspirin, amiodarone, Eliquis, potassium,  Brilinta, ProAmatine. PHYSICAL EXAMINATION:  GENERAL:  The patient is awake and answering questions. She is slightly  confused. VITAL SIGNS:  Temperature is afebrile, pulse is 90 to 100, blood  pressure 90/67. HEENT:  Head is normocephalic and atraumatic. Pupils are equal and  reactive. CHEST:  Equal expansion. Bibasilar crackles noted. HEART:  Regular rhythm. ABDOMEN:  Soft and nontender. Bowel sounds are present. EXTREMITIES:  +2 to 3 edema present. NEUROLOGIC:  Cranial nerves II through XII are grossly intact. LABORATORY DATA:  BUN is 26, creatinine 1.0. Troponin is negative. BNP  is 30,000. LFTs are normal.  CBC is within normal range.     Her EKG shows atrial fibrillation and paced rhythm

## 2020-05-11 NOTE — CONSULTS
[address] Continence Nurse  Consult Note       Yamil Mcdermott  AGE: 81 y.o. GENDER: female  : 1935  TODAY'S DATE:  2020    Subjective:     Reason for  Evaluation and Assessment: wound assessment      Johanne Coyne is a 81 y.o. female referred by:   [x] Physician  [] Nursing  [] Other:     Wound Identification:  Wound Type: pressure and traumatic  Contributing Factors: edema, diabetes, chronic pressure, decreased mobility, anticoagulation therapy and malnutrition        PAST MEDICAL HISTORY        Diagnosis Date    Abnormal CT scan, lung 2019    Peripheral consolidation in the right lower lobe, approximately 1.8 x 1.3 cm.   There is a calcified granuloma in the right lower lobe    Aortic stenosis     Arthritis     Hands, back    Atrial fibrillation (HCC)     CAD (coronary artery disease)     Diabetes mellitus (HCC)     Type II - diet controlled as of 10/2/19    Heart murmur     Dr. Jennifer Qiu History of cardiac pacemaker in situ     Hyperlipidemia     Hypertension     Follows with PCP    Systolic heart failure ([facility]     LVEF 20-30%       PAST SURGICAL HISTORY    Past Surgical History:   Procedure Laterality Date    ABDOMINAL EXPLORATION SURGERY  [id number]    distal gastrectomy, migel en y , cholecystectomy    CHOLECYSTECTOMY      CORONARY ANGIOPLASTY WITH STENT PLACEMENT  2019    LAD per Dr Cristhian Herrera Bilateral 2000 & 2009    cataracts   [facility] N/A 2020    PACEMAKER INSERTION PERMANENT performed by Gregory Solano MD at [facility]    Family History   Problem Relation Age of Onset    Heart Disease Mother     High Blood Pressure Mother     Heart Disease Father     High Blood Pressure Father     Heart Attack Father        SOCIAL HISTORY    Social History     Tobacco Use    Smoking status: Former Smoker     Packs/day: 1.00     Years: 59.00 SpO2 90%   BMI 21.30 kg/m²   Ted Risk Score: Ted Scale Score: 15    LABS    CBC:   Lab Results   Component Value Date    WBC 13.3 05/11/2020    RBC 3.94 05/11/2020    HGB 11.1 05/11/2020    HCT 36.3 05/11/2020    MCV 92.1 05/11/2020    MCH 28.2 05/11/2020    MCHC 30.6 05/11/2020    RDW 17.3 05/11/2020     05/11/2020    MPV 9.9 05/11/2020     CMP:    Lab Results   Component Value Date     05/11/2020    K 4.9 05/11/2020    CL 94 05/11/2020    CO2 31 05/11/2020    BUN 26 05/11/2020    CREATININE 1.0 05/11/2020    GFRAA >60 05/11/2020    LABGLOM 53 05/11/2020    LABGLOM 68 10/15/2019    GLUCOSE 198 05/11/2020    PROT 7.0 05/11/2020    PROT 7.4 04/10/2012    LABALBU 2.5 05/11/2020    CALCIUM 9.6 05/11/2020    BILITOT 0.7 05/11/2020    ALKPHOS 142 05/11/2020    AST 27 05/11/2020    ALT 27 05/11/2020     Albumin:    Lab Results   Component Value Date    LABALBU 2.5 05/11/2020     PT/INR:    Lab Results   Component Value Date    PROTIME 17.2 05/01/2020    INR 1.42 05/01/2020     HgBA1c:    Lab Results   Component Value Date    LABA1C 6.9 09/12/2019         Assessment:     Patient Active Problem List   Diagnosis    Atrial flutter (HCC)    Moderate malnutrition ([facility]    Pneumonia of left lower lobe due to infectious organism ([facility]    Aortic valve stenosis    Type 2 diabetes mellitus without complication, without long-term current use of insulin ([facility]    ST elevation MI (STEMI) ([facility]    STEMI (ST elevation myocardial infarction) ([facility]    [facility]    Acute systolic (congestive) heart failure (HCC)    Acute metabolic encephalopathy    Shock liver    Aspiration pneumonia (HCC)    Acute on chronic systolic heart failure (HCC)    Presence of temporary transvenous cardiac pacemaker    Acute heart failure (HCC)    Systolic heart failure (HCC)    History of cardiac pacemaker in situ    CAD (coronary artery disease)    Atrial fibrillation ([address]    Aortic stenosis       Measurements:  Wound 11/10/19 Toe (Comment  which one) Anterior;Right toenail lifted, torn and bleeding (Active)   Number of days: 183       Wound 05/01/20 Pretibial Right; Anterior (Active)   Wound Other 5/11/2020  3:10 PM   Dressing Status Changed 5/11/2020  3:10 PM   Dressing Changed Changed/New 5/11/2020  3:10 PM   Wound Cleansed Rinsed/Irrigated with saline 5/11/2020  3:10 PM   Dressing Change Due 05/05/20 5/5/2020  8:15 PM   Wound Length (cm) 0.9 cm 5/11/2020  3:10 PM   Wound Width (cm) 0.4 cm 5/11/2020  3:10 PM   Wound Depth (cm) 0.1 cm 5/11/2020  3:10 PM   Wound Surface Area (cm^2) 0.36 cm^2 5/11/2020  3:10 PM   Change in Wound Size % (l*w) -44 5/11/2020  3:10 PM   Wound Volume (cm^3) 0.04 cm^3 5/11/2020  3:10 PM   Wound Healing % -100 5/11/2020  3:10 PM   Distance Tunneling (cm) 0 cm 5/11/2020  3:10 PM   Tunneling Position ___ O'Clock 0 5/11/2020  3:10 PM   Undermining Starts ___ O'Clock 0 5/11/2020  3:10 PM   Undermining Ends___ O'Clock 0 5/11/2020  3:10 PM   Undermining Maxium Distance (cm) 0 5/11/2020  3:10 PM   Wound Assessment Yellow;Pink 5/11/2020  3:10 PM   Drainage Amount Large 5/11/2020  3:10 PM   Drainage Description Serous 5/11/2020  3:10 PM   Odor None 5/11/2020  3:10 PM   Margins Defined edges 5/11/2020  3:10 PM   Lara-wound Assessment Edema; Intact 5/11/2020  3:10 PM   Non-staged Wound Description Full thickness 5/11/2020  3:10 PM   Kiln%Wound Bed 50 5/11/2020  3:10 PM   Yellow%Wound Bed 50 5/11/2020  3:10 PM   Other%Wound Bed 50 5/1/2020  1:00 PM   Number of days: 10       Wound 05/01/20 Coccyx Proximal (Active)   Wound Pressure Unstageable 5/11/2020  3:10 PM   Dressing Status Reinforced 5/11/2020  3:10 PM   Dressing Changed Dressing reinforced 5/11/2020  3:10 PM   Wound Cleansed Rinsed/Irrigated with saline 5/11/2020  3:10 PM   Dressing Change Due 05/05/20 5/5/2020  8:15 PM   Wound Length (cm) 0.8 cm 5/11/2020  3:10 PM   Wound Width (cm) 1.1 cm 5/11/2020  3:10 PM   Wound Depth (cm) 0.1 cm 5/11/2020  3:10 PM Wound Surface Area (cm^2) 0.88 cm^2 5/11/2020  3:10 PM   Change in Wound Size % (l*w) 12 5/11/2020  3:10 PM   Wound Volume (cm^3) 0.09 cm^3 5/11/2020  3:10 PM   Wound Healing % 55 5/11/2020  3:10 PM   Distance Tunneling (cm) 0 cm 5/11/2020  3:10 PM   Tunneling Position ___ O'Clock 0 5/11/2020  3:10 PM   Undermining Starts ___ O'Clock 0 5/11/2020  3:10 PM   Undermining Ends___ O'Clock 0 5/11/2020  3:10 PM   Undermining Maxium Distance (cm) 0 5/11/2020  3:10 PM   Wound Assessment Yellow 5/11/2020  3:10 PM   Drainage Amount Scant 5/11/2020  3:10 PM   Drainage Description Serous 5/11/2020  3:10 PM   Odor None 5/11/2020  3:10 PM   Margins Defined edges 5/11/2020  3:10 PM   Lara-wound Assessment Red 5/11/2020  3:10 PM   Non-staged Wound Description Full thickness 5/11/2020  3:10 PM   Steward%Wound Bed 30 5/1/2020  1:00 PM   Yellow%Wound Bed 100 5/11/2020  3:10 PM   Number of days: 10       Wound 05/11/20 Tibial Left;Posterior cluster (Active)   Wound Traumatic 5/11/2020  3:10 PM   Dressing Status Changed 5/11/2020  3:10 PM   Dressing Changed Changed/New 5/11/2020  3:10 PM   Wound Cleansed Rinsed/Irrigated with saline 5/11/2020  3:10 PM   Wound Length (cm) 9.1 cm 5/11/2020  3:10 PM   Wound Width (cm) 1.2 cm 5/11/2020  3:10 PM   Wound Depth (cm) 0.1 cm 5/11/2020  3:10 PM   Wound Surface Area (cm^2) 10.92 cm^2 5/11/2020  3:10 PM   Wound Volume (cm^3) 1.09 cm^3 5/11/2020  3:10 PM   Distance Tunneling (cm) 0 cm 5/11/2020  3:10 PM   Tunneling Position ___ O'Clock 0 5/11/2020  3:10 PM   Undermining Starts ___ O'Clock 0 5/11/2020  3:10 PM   Undermining Ends___ O'Clock 0 5/11/2020  3:10 PM   Undermining Maxium Distance (cm) 0 5/11/2020  3:10 PM   Wound Assessment Red;Purple 5/11/2020  3:10 PM   Drainage Amount Small 5/11/2020  3:10 PM   Drainage Description Serosanguinous 5/11/2020  3:10 PM   Odor None 5/11/2020  3:10 PM   Margins Defined edges 5/11/2020  3:10 PM   Lara-wound Assessment Ecchymosis; Intact 5/11/2020  3:10 PM

## 2020-05-11 NOTE — ED PROVIDER NOTES
Triage Chief Complaint:   Shortness of Breath    [place]:  Howard Garrett is a 81 y.o. female that presents with shortness of breath. Patient arrives via EMS from nursing facility after she started experiencing shortness of breath over the last few hours without triggering event or alleviating or exacerbating factors. She was put on 4 L nasal cannula and does not usually use oxygen. Oxygen saturation was in the 80s on room air. Patient states that she feels somewhat better at this time. States that over the past few weeks she has had worsening lower extremity edema. She was recently admitted to the hospital for decompensated heart failure, moderate aortic stenosis, atrial flutter requiring pacemaker placement. Denies any chest pain, cough, fever, abdominal pain, nausea, vomiting, diarrhea. ROS:  At least 14 systems reviewed and otherwise acutely negative except as in the [address] Past Medical History:   Diagnosis Date    Abnormal CT scan, lung 09/12/2019    Peripheral consolidation in the right lower lobe, approximately 1.8 x 1.3 cm.   There is a calcified granuloma in the right lower lobe    Arthritis     Hands, back    Diabetes mellitus (HCC)     Type II - diet controlled as of 10/2/19    Heart murmur     Dr. Mcdonald Lunch Hyperlipidemia     Hypertension     Follows with PCP     Past Surgical History:   Procedure Laterality Date    ABDOMINAL EXPLORATION SURGERY  [id number]    distal gastrectomy, migel en y , cholecystectomy    CHOLECYSTECTOMY  2015    EYE SURGERY Bilateral 2000 & 2009    cataracts    HYSTERECTOMY  1985    PACEMAKER INSERTION N/A 5/2/2020    PACEMAKER INSERTION PERMANENT performed by Myrna Macias MD [facility]     Family History   Problem Relation Age of Onset    Heart Disease Mother     High Blood Pressure Mother     Heart Disease Father     High Blood Pressure Father     Heart Attack Father      Social History     Socioeconomic

## 2020-05-11 NOTE — ED TRIAGE NOTES
Arrives to Ed via squad from [facility] with C/O of sudden onset of shortness of breath. . Pt has pacemaker placed approx 2 weeks ago.

## 2020-05-11 NOTE — CONSULTS
[address] [place] [address] Consult Note    Date: 5/12/2020  Name: Laura Chambers  MRN: [id number]  YOB: 1935   Patient's PCP: Brooklyn Gillis MD   Cardiology: Dr Patricia Blount  Referring Physician: Avril Marie PA-C, Dr. Holly Rogers, Dr Alissa Fitzgerald care admission date: 5/11/2020 and 4/30 to 5/6/2020     Informant: Chart reviewed, discussed with Dr Patricia Blount on 5/11/20, the patient's nurse and the hospice nurse liaison. I met with the patient at the bedside. No family are here. CC: weakness, heart failure    [place]: This is a 81 y.o. female with history of coronary artery disease, LAD PCI for STEMI in 84/7450, Systolic Heart Failure with LVEF 20-30% and cardiogenic shock with Impella in November 2019, atrial fibrillation, bradycardia with Permanent pacemaker 5/2/20 per Dr Geetha Salas, malnutrition, LA thrombus per JANKI 4/30/20, who was recently admitted for decompensated heart failure and went to [facility] on discharge. The patient was re- admitted on 5/11/2020 from the [address] with dyspnea, hypoxia with sats in the 80's. Workup shows large left and moderate right pleural effusion, negative Troponin T, Pro-BNP of 30,230. The patient was placed on BiPap, and given diuretics. The patient's borderline BP's have limited the use of decongestive medications. She is not felt to be a candidate for TAVR. The patient has had generalized weakness. There has been weight loss. The patient reports that she feels some better today. Hospice was asked to provide information, and the liaison met with the patient's daughter on 5/11/20. Per follow up phone call today, the patient's daughter wants the patient to return to [facility] for continued therapy, and will consider hospice in the future, perhaps when she comes home. I did discuss Hospice philosophy with the patient, and she would be agreeable with the additional help at home. 200 MG tablet Take 1 tablet by mouth daily 11/18/19  Yes Krysten Jang MD   apixaban (ELIQUIS) 2.5 MG TABS tablet Take 1 tablet by mouth 2 times daily 11/18/19  Yes Krysten Jang MD   potassium chloride (KLOR-CON M) 10 MEQ extended release tablet Take 1 tablet by mouth 2 times daily 11/18/19  Yes Krysten Jang MD   ticagrelor (BRILINTA) 90 MG TABS tablet Take 1 tablet by mouth 2 times daily 11/18/19  Yes Krysten Jang MD   midodrine (PROAMATINE) 10 MG tablet Take 1 tablet by mouth 3 times daily (with meals) 11/18/19  Yes Krysten Jang MD   magnesium gluconate (MAGONATE) 500 MG tablet Take 250 mg by mouth 2 times daily   Yes Historical Provider, MD   travoprost, benzalkonium, (TRAVATAN) 0.004 % ophthalmic solution Place 1 drop into both eyes nightly    Yes Historical Provider, MD   pantoprazole (PROTONIX) 40 MG tablet Take 1 tablet by mouth daily 9/21/15  Yes Annetta Hoffmann MD   atorvastatin (LIPITOR) 10 MG tablet Take 10 mg by mouth daily   Yes Historical Provider, MD       ROS: As noted in [address], all other systems are limited due to the patient's clinical condition but reviewed as able:  Constitutional: generally weak, no fever, chills, + weight loss  HEENT:  No acute visual changes, nasal drainage,   CV:  No chest pain, + dyspnea on exertion,   PULM:  No cough, no shortness of breath at rest with oxygen, no hemoptysis  GI:  No nausea, vomiting, diarrhea, hematemesis, melena, hematochezia  :  No dysuria, hematuria  Musculoskeletal:  Edema is better  Neuro: No seizures, syncope, + generalized weakness  Skin:  No rash    Weight:  162.4 pounds 11/12/2019  Wt Readings from Last 3 Encounters:   05/11/20 136 lb 0.4 oz (61.7 kg)   05/06/20 142 lb 9.6 oz (64.7 kg)   11/19/19 140 lb 7 oz (63.7 kg)       Data reviewed 5/12/2020:  Transesophageal echocardiogram 4/30/2020:   Left ventricular systolic function is abnormal.   Ejection fraction is visually estimated at 25%.   Severe aortic stenosis. Thinned and mobile interatrial septum with bowing towards the right heart   suggestive of pressure overload. There are two clots noted within the left atrial appendage. One is a mural   thrombus and the other is mobile and appears to be attached at the coumadin   ridge. No pericardial effusion . Chest X-ray 5/11/2020:  1. No significant interval change in volume of large right and moderate left  pleural effusions. 2. Bilateral perihilar worsening ill-defined consolidation consistent with  alveolar edema. 3. Moderate cardiomegaly.      Cardiac catheterization/PCI 11/5/2019 Dr Errol Macedo:  [id number]  LEFT MAIN PATENT  LAD % TO 0% JOAQUÍN RESOLUTE 2.5X38MM STENT, CRYSTAL-O-TO CRYSTAL-III FLOW  LCX MILD DX  RCA MILD DX  LVEDP 20  ASA AND HEPARIN AND BRILIANTA AND AGGROSTAT  ANGIOSEAL     Pro-BNP 5/11/20: 30,230   Troponin T: < 0.010  Hemoglobin A1C 9/12/2019: 6.9%  Hepatic Function Panel:    Lab Results   Component Value Date    ALKPHOS 142 05/11/2020    ALT 27 05/11/2020    AST 27 05/11/2020    PROT 7.0 05/11/2020    PROT 7.4 04/10/2012    BILITOT 0.7 05/11/2020    BILIDIR 0.2 02/27/2019    IBILI 0.3 02/27/2019    LABALBU 2.5 05/11/2020     CBC:   Recent Labs     05/11/20  0440   WBC 13.3*   HGB 11.1*   HCT 36.3*   MCV 92.1        BMP:   Recent Labs     05/11/20  0440      K 4.9   CL 94*   CO2 31   BUN 26*   CREATININE 1.0   GLUCOSE 198*       Physical Exam:   /76   Pulse 70   Temp 97.2 °F (36.2 °C) (Oral)   Resp 21   Ht 5' 7\" (1.702 m)   Wt 136 lb 0.4 oz (61.7 kg)   SpO2 96%   BMI 21.30 kg/m²   General: Comfortable, weak and frail, and chronically ill appearing, in no distress  Skin: decreased subcutaneous fat, scattered bruising  HEENT: Mucous membranes are mildly dry, sclerae are clear  Neck: JVP difficult to see given positioning, carotid upstrokes are diminished without bruit  Chest: Permanent pacemaker on left   Heart: distant tones, IRRR, S1S2, no murmur  Lungs:  Equal breath sounds bilaterally, diminished at the bases R>L, with basilar rales, no rhonchi   Abdomen: scaphoid, soft, bowel sounds present, no tenderness, nondistended  Extremities:  No mottling, chronic changes in legs, trace edema  Neurologic: alert, generally weak    Assessment/Plan:  1. Systolic Heart Failure with LVEF 20-30% with valvular heart disease. The patient is eligible for Hospice, and information is provided for the future. The patient is to return to [facility] for rehab. The patient's borderline BP's limit decongestive therapy. 2. Aortic stenosis, not felt to be a candidate for TAVR  3. Coronary artery disease with STEMI 11/2019 and LAD PCI, with history of cardiogenic shock and Impella, on ASA and Brilinta  4. Bradycardia with Permanent pacemaker 5/2/2020  5. LA thrombus, on Apixiban  6. Type 2 diabetes mellitus  7. Atrial fibrillation/flutter on Amiodarone  8.  Protein calorie malnutrition, severe with serum albumin is 2.5 gm/dl       Patient Active Problem List   Diagnosis Code    Atrial flutter (HCC) I48.92    Moderate malnutrition (HCC) E44.0    Pneumonia of left lower lobe due to infectious organism ([facility] J18.1    Aortic valve stenosis I35.0    Type 2 diabetes mellitus without complication, without long-term current use of insulin (HCC) E11.9    ST elevation MI (STEMI) (HCC) I21.3    STEMI (ST elevation myocardial infarction) (HCC) I21.3    Acute MI (HCC) L56.2    Acute systolic (congestive) heart failure (HCC) I50.21    Acute metabolic encephalopathy C91.65    Shock liver K72.00    Aspiration pneumonia (HCC) J69.0    Acute on chronic systolic heart failure (HCC) I50.23    Presence of temporary transvenous cardiac pacemaker Z95.0    Acute heart failure (HCC) V26.4    Systolic heart failure (HCC) I50.20    History of cardiac pacemaker in situ Z95.0    CAD (coronary artery disease) I25.10    Atrial fibrillation (HCC) I48.91   

## 2020-05-11 NOTE — DISCHARGE INSTR - COC
Continuity of Care Form    Patient Name: Jennifer Murphy   :  1935  MRN:  [id number]    Admit date:  2020  Discharge date:  20    Code Status Order: Prior   Advance Directives:     Admitting Physician:  No admitting provider for patient encounter. PCP: Yann Henriquez MD    Discharging Nurse: Melyssa Rose 44 Unit/Room#: TR03/03TR-03  Discharging Unit Phone Number: [phone]    Emergency Contact:   Extended Emergency Contact Information  Primary Emergency Contact: Carl Mcdermott  Address: [place] [address], [address] Phone: [phone]  Work Phone: [phone]  Relation: Spouse  Secondary Emergency Contact: Liv Lack, Ankit Villarreal Omar Robertclaudiamitchell Phone: [phone]  Mobile Phone: [phone]  Relation: Child   needed? No    Past Surgical History:  Past Surgical History:   Procedure Laterality Date    ABDOMINAL EXPLORATION SURGERY  [id number]    distal gastrectomy, migel en y , cholecystectomy    CHOLECYSTECTOMY  2015    EYE SURGERY Bilateral  & 2009    cataracts    HYSTERECTOMY  1985    PACEMAKER INSERTION N/A 2020    PACEMAKER INSERTION PERMANENT [facility] Flex Yoon [facility]       Immunization History: There is no immunization history on file for this patient.     Active Problems:  Patient Active Problem List   Diagnosis Code    Atrial flutter (HCC) I48.92    Moderate malnutrition (HCC) E44.0    Pneumonia of left lower lobe due to infectious organism ([facility] J18.1    Aortic valve stenosis I35.0    Type 2 diabetes mellitus without complication, without long-term current use of insulin (HCC) E11.9    ST elevation MI (STEMI) (HCC) I21.3    STEMI (ST elevation myocardial infarction) (HCC) I21.3    Acute MI (HCC) L13.2    Acute systolic (congestive) heart failure (HCC) I50.21    Acute metabolic encephalopathy A41.75    Shock liver K72.00  Aspiration pneumonia (HCC) J69.0    Acute on chronic systolic heart failure (HCC) I50.23    Presence of temporary transvenous cardiac pacemaker Z95.0       Isolation/Infection:   Isolation          No Isolation        Patient Infection Status     None to display          Nurse Assessment:  Last Vital Signs: BP (!) 143/77   Pulse 97   Temp 97.6 °F (36.4 °C) (Oral)   Resp 21   Ht 5' 6\" (1.676 m)   Wt 142 lb (64.4 kg)   SpO2 (!) 7%   BMI 22.92 kg/m²     Last documented pain score (0-10 scale):    Last Weight:   Wt Readings from Last 1 Encounters:   05/11/20 142 lb (64.4 kg)     Mental Status:  oriented, alert and confused at times    IV Access:  - None    Nursing Mobility/ADLs:  Walking   Dependent  Transfer  Dependent  Bathing  Dependent  Dressing  Assisted  Toileting  Dependent  Feeding  Assisted  Med Admin  Assisted  Med Delivery   crushed and prefers mixed with Pudding    Wound Care Documentation and Therapy:  Wound 11/10/19 Toe (Comment  which one) Anterior;Right toenail lifted, torn and bleeding (Active)   Number of days: 182       Wound 05/01/20 Pretibial Right; Anterior (Active)   Number of days: 9       Wound 05/01/20 Pretibial Right;Medial (Active)   Number of days: 9       Wound 05/01/20 Pretibial Left;Medial pinpoint cluster (Active)   Number of days: 9       Wound 05/01/20 Coccyx (Active)   Number of days: 9       Wound 05/01/20 Buttocks Right; Inner (Active)   Number of days: 9        Elimination:  Continence:   · Bowel: Yes  · Bladder: Yes  Urinary Catheter: Insertion Date: 5/11/20   Colostomy/Ileostomy/Ileal Conduit: No       Date of Last BM: 5/11/20  No intake or output data in the 24 hours ending 05/11/20 0530  No intake/output data recorded.     Safety Concerns:     Sundowners Sundrome and At Risk for Falls    Impairments/Disabilities:      None    Patient's personal belongings (please select all that are sent with patient):  Glasses    RN SIGNATURE:  Electronically signed by Magdi Lion

## 2020-05-12 VITALS
OXYGEN SATURATION: 98 % | WEIGHT: 136.02 LBS | DIASTOLIC BLOOD PRESSURE: 73 MMHG | HEART RATE: 92 BPM | SYSTOLIC BLOOD PRESSURE: 107 MMHG | RESPIRATION RATE: 24 BRPM | BODY MASS INDEX: 21.35 KG/M2 | TEMPERATURE: 97.3 F | HEIGHT: 67 IN

## 2020-05-12 LAB
ALBUMIN SERPL-MCNC: 2.4 GM/DL (ref 3.4–5)
ALP BLD-CCNC: 128 IU/L (ref 40–128)
ALT SERPL-CCNC: 23 U/L (ref 10–40)
ANION GAP SERPL CALCULATED.3IONS-SCNC: 12 MMOL/L (ref 4–16)
AST SERPL-CCNC: 23 IU/L (ref 15–37)
BILIRUB SERPL-MCNC: 0.6 MG/DL (ref 0–1)
BUN BLDV-MCNC: 25 MG/DL (ref 6–23)
CALCIUM SERPL-MCNC: 9.4 MG/DL (ref 8.3–10.6)
CHLORIDE BLD-SCNC: 94 MMOL/L (ref 99–110)
CO2: 33 MMOL/L (ref 21–32)
CREAT SERPL-MCNC: 1 MG/DL (ref 0.6–1.1)
GFR AFRICAN AMERICAN: >60 ML/MIN/1.73M2
GFR NON-AFRICAN AMERICAN: 53 ML/MIN/1.73M2
GLUCOSE BLD-MCNC: 131 MG/DL (ref 70–99)
GLUCOSE BLD-MCNC: 134 MG/DL (ref 70–99)
GLUCOSE BLD-MCNC: 160 MG/DL (ref 70–99)
GLUCOSE BLD-MCNC: 205 MG/DL (ref 70–99)
HCT VFR BLD CALC: 35.7 % (ref 37–47)
HEMOGLOBIN: 10.7 GM/DL (ref 12.5–16)
MCH RBC QN AUTO: 28.2 PG (ref 27–31)
MCHC RBC AUTO-ENTMCNC: 30 % (ref 32–36)
MCV RBC AUTO: 93.9 FL (ref 78–100)
PDW BLD-RTO: 17.5 % (ref 11.7–14.9)
PLATELET # BLD: 245 K/CU MM (ref 140–440)
PMV BLD AUTO: 10 FL (ref 7.5–11.1)
POTASSIUM SERPL-SCNC: 4.2 MMOL/L (ref 3.5–5.1)
PRO-BNP: ABNORMAL PG/ML
RBC # BLD: 3.8 M/CU MM (ref 4.2–5.4)
SODIUM BLD-SCNC: 139 MMOL/L (ref 135–145)
TOTAL PROTEIN: 6 GM/DL (ref 6.4–8.2)
WBC # BLD: 9.8 K/CU MM (ref 4–10.5)

## 2020-05-12 PROCEDURE — 6370000000 HC RX 637 (ALT 250 FOR IP): Performed by: GENERAL PRACTICE

## 2020-05-12 PROCEDURE — 85027 COMPLETE CBC AUTOMATED: CPT

## 2020-05-12 PROCEDURE — 2700000000 HC OXYGEN THERAPY PER DAY

## 2020-05-12 PROCEDURE — 82962 GLUCOSE BLOOD TEST: CPT

## 2020-05-12 PROCEDURE — 2580000003 HC RX 258: Performed by: GENERAL PRACTICE

## 2020-05-12 PROCEDURE — 6370000000 HC RX 637 (ALT 250 FOR IP): Performed by: UROLOGY

## 2020-05-12 PROCEDURE — 94761 N-INVAS EAR/PLS OXIMETRY MLT: CPT

## 2020-05-12 PROCEDURE — 36415 COLL VENOUS BLD VENIPUNCTURE: CPT

## 2020-05-12 PROCEDURE — 80053 COMPREHEN METABOLIC PANEL: CPT

## 2020-05-12 PROCEDURE — 6360000002 HC RX W HCPCS: Performed by: INTERNAL MEDICINE

## 2020-05-12 PROCEDURE — 83880 ASSAY OF NATRIURETIC PEPTIDE: CPT

## 2020-05-12 PROCEDURE — 80048 BASIC METABOLIC PNL TOTAL CA: CPT

## 2020-05-12 RX ORDER — LANOLIN ALCOHOL/MO/W.PET/CERES
200 CREAM (GRAM) TOPICAL DAILY
Qty: 30 TABLET | Refills: 1 | Status: SHIPPED | OUTPATIENT
Start: 2020-05-12 | End: 2020-05-12 | Stop reason: HOSPADM

## 2020-05-12 RX ORDER — FUROSEMIDE 40 MG/1
40 TABLET ORAL 3 TIMES DAILY
Qty: 90 TABLET | Refills: 2 | Status: SHIPPED | OUTPATIENT
Start: 2020-05-12

## 2020-05-12 RX ADMIN — COLLAGENASE SANTYL: 250 OINTMENT TOPICAL at 08:49

## 2020-05-12 RX ADMIN — FUROSEMIDE 40 MG: 10 INJECTION, SOLUTION INTRAMUSCULAR; INTRAVENOUS at 08:43

## 2020-05-12 RX ADMIN — TICAGRELOR 90 MG: 90 TABLET ORAL at 08:42

## 2020-05-12 RX ADMIN — MIDODRINE HYDROCHLORIDE 10 MG: 5 TABLET ORAL at 12:12

## 2020-05-12 RX ADMIN — Medication 200 MG: at 08:42

## 2020-05-12 RX ADMIN — ATORVASTATIN CALCIUM 10 MG: 10 TABLET, FILM COATED ORAL at 08:41

## 2020-05-12 RX ADMIN — MIDODRINE HYDROCHLORIDE 10 MG: 5 TABLET ORAL at 08:42

## 2020-05-12 RX ADMIN — SODIUM CHLORIDE, PRESERVATIVE FREE 10 ML: 5 INJECTION INTRAVENOUS at 08:49

## 2020-05-12 RX ADMIN — INSULIN LISPRO 2 UNITS: 100 INJECTION, SOLUTION INTRAVENOUS; SUBCUTANEOUS at 12:13

## 2020-05-12 RX ADMIN — FUROSEMIDE 40 MG: 10 INJECTION, SOLUTION INTRAMUSCULAR; INTRAVENOUS at 14:35

## 2020-05-12 RX ADMIN — POTASSIUM CHLORIDE 10 MEQ: 1500 TABLET, EXTENDED RELEASE ORAL at 08:42

## 2020-05-12 RX ADMIN — APIXABAN 2.5 MG: 2.5 TABLET, FILM COATED ORAL at 08:41

## 2020-05-12 RX ADMIN — PANTOPRAZOLE SODIUM 40 MG: 40 TABLET, DELAYED RELEASE ORAL at 08:41

## 2020-05-12 RX ADMIN — IVABRADINE 5 MG: 5 TABLET, FILM COATED ORAL at 08:41

## 2020-05-12 RX ADMIN — METOPROLOL SUCCINATE 25 MG: 25 TABLET, EXTENDED RELEASE ORAL at 08:42

## 2020-05-12 RX ADMIN — AMIODARONE HYDROCHLORIDE 200 MG: 200 TABLET ORAL at 08:41

## 2020-05-12 ASSESSMENT — PAIN SCALES - GENERAL
PAINLEVEL_OUTOF10: 0
PAINLEVEL_OUTOF10: 0

## 2020-05-12 NOTE — CARE COORDINATION
Spoke to Dr Skylar Palma and PA at [facility]. He would like to discharge patient to home with daughter and Lisette Bright support. Lisette Kaila on board to follow up today. Garth Oconnor RN    [phone] Spoke to SAÚL Sharif) from [facility]. Daughter wants patient to return to [place] short term and arrange Lisette Kaila support at home later. Contacted [facility]; spoke to [place]. They can take patient back today without precert. Garth Oconnor RN    C/ Jeffy Alexander 19 Dr Skylar Palma thru [facility]. He will contact daughter before discharging patient to [place] today. Garth Oconnor RN    9730 Discharge in place. Contacted [facility]; can take patient at any time. [facility] contacted; 4:14 discharge time. RN aware.  Garth Oconnor RN

## 2020-05-14 LAB
EKG DIAGNOSIS: NORMAL
EKG Q-T INTERVAL: 382 MS
EKG QRS DURATION: 136 MS
EKG QTC CALCULATION (BAZETT): 492 MS
EKG R AXIS: -84 DEGREES
EKG T AXIS: 87 DEGREES
EKG VENTRICULAR RATE: 100 BPM

## 2020-05-15 ENCOUNTER — HOSPITAL ENCOUNTER (OUTPATIENT)
Age: 85
Setting detail: SPECIMEN
Discharge: HOME | End: 2020-05-15
Payer: MEDICARE

## 2020-05-15 LAB
ANION GAP SERPL CALCULATED.3IONS-SCNC: 9 MMOL/L (ref 4–16)
BUN BLDV-MCNC: 31 MG/DL (ref 6–23)
CALCIUM SERPL-MCNC: 9.2 MG/DL (ref 8.3–10.6)
CHLORIDE BLD-SCNC: 93 MMOL/L (ref 99–110)
CO2: 42 MMOL/L (ref 21–32)
CREAT SERPL-MCNC: 1 MG/DL (ref 0.6–1.1)
GFR AFRICAN AMERICAN: >60 ML/MIN/1.73M2
GFR NON-AFRICAN AMERICAN: 53 ML/MIN/1.73M2
GLUCOSE BLD-MCNC: 130 MG/DL (ref 70–99)
HCT VFR BLD CALC: 34.7 % (ref 37–47)
HEMOGLOBIN: 10 GM/DL (ref 12.5–16)
MCH RBC QN AUTO: 27.3 PG (ref 27–31)
MCHC RBC AUTO-ENTMCNC: 28.8 % (ref 32–36)
MCV RBC AUTO: 94.8 FL (ref 78–100)
PDW BLD-RTO: 17.2 % (ref 11.7–14.9)
PLATELET # BLD: 262 K/CU MM (ref 140–440)
PMV BLD AUTO: 10.1 FL (ref 7.5–11.1)
POTASSIUM SERPL-SCNC: 3.9 MMOL/L (ref 3.5–5.1)
RBC # BLD: 3.66 M/CU MM (ref 4.2–5.4)
SODIUM BLD-SCNC: 144 MMOL/L (ref 135–145)
WBC # BLD: 7.7 K/CU MM (ref 4–10.5)

## 2020-05-15 PROCEDURE — 36415 COLL VENOUS BLD VENIPUNCTURE: CPT

## 2020-05-15 PROCEDURE — 80048 BASIC METABOLIC PNL TOTAL CA: CPT

## 2020-05-15 PROCEDURE — 85027 COMPLETE CBC AUTOMATED: CPT

## 2020-05-19 ENCOUNTER — HOSPITAL ENCOUNTER (OUTPATIENT)
Age: 85
Setting detail: SPECIMEN
Discharge: HOME | End: 2020-05-19

## 2020-05-19 LAB
HCT VFR BLD CALC: 38.4 % (ref 37–47)
HEMOGLOBIN: 11 GM/DL (ref 12.5–16)
MCH RBC QN AUTO: 27.4 PG (ref 27–31)
MCHC RBC AUTO-ENTMCNC: 28.6 % (ref 32–36)
MCV RBC AUTO: 95.8 FL (ref 78–100)
PDW BLD-RTO: 17 % (ref 11.7–14.9)
PLATELET # BLD: 268 K/CU MM (ref 140–440)
PMV BLD AUTO: 10.5 FL (ref 7.5–11.1)
RBC # BLD: 4.01 M/CU MM (ref 4.2–5.4)
REASON FOR REJECTION: NORMAL
REJECTED TEST: NORMAL
WBC # BLD: 5.8 K/CU MM (ref 4–10.5)

## 2020-05-19 PROCEDURE — 85027 COMPLETE CBC AUTOMATED: CPT

## 2020-05-19 PROCEDURE — 36415 COLL VENOUS BLD VENIPUNCTURE: CPT

## 2020-05-20 ENCOUNTER — HOSPITAL ENCOUNTER (OUTPATIENT)
Age: 85
Setting detail: SPECIMEN
Discharge: HOME | End: 2020-05-20

## 2020-05-20 LAB
ANION GAP SERPL CALCULATED.3IONS-SCNC: 12 MMOL/L (ref 4–16)
BUN BLDV-MCNC: 22 MG/DL (ref 6–23)
CALCIUM SERPL-MCNC: 9.1 MG/DL (ref 8.3–10.6)
CHLORIDE BLD-SCNC: 96 MMOL/L (ref 99–110)
CO2: 37 MMOL/L (ref 21–32)
CREAT SERPL-MCNC: 1.1 MG/DL (ref 0.6–1.1)
GFR AFRICAN AMERICAN: 57 ML/MIN/1.73M2
GFR NON-AFRICAN AMERICAN: 47 ML/MIN/1.73M2
GLUCOSE BLD-MCNC: 118 MG/DL (ref 70–99)
POTASSIUM SERPL-SCNC: 4.2 MMOL/L (ref 3.5–5.1)
REASON FOR REJECTION: NORMAL
REJECTED TEST: NORMAL
SODIUM BLD-SCNC: 145 MMOL/L (ref 135–145)

## 2020-05-20 PROCEDURE — 80048 BASIC METABOLIC PNL TOTAL CA: CPT

## 2020-05-20 PROCEDURE — 36415 COLL VENOUS BLD VENIPUNCTURE: CPT

## 2021-01-01 ENCOUNTER — HOSPITAL ENCOUNTER (OUTPATIENT)
Age: 86
Setting detail: SPECIMEN
Discharge: HOME | End: 2021-11-23
Payer: COMMERCIAL

## 2021-01-01 ENCOUNTER — HOSPITAL ENCOUNTER (OUTPATIENT)
Age: 86
Setting detail: SPECIMEN
Discharge: HOME | End: 2021-10-26

## 2021-01-01 ENCOUNTER — HOSPITAL ENCOUNTER (OUTPATIENT)
Age: 86
Setting detail: SPECIMEN
Discharge: HOME | End: 2021-12-21
Payer: COMMERCIAL

## 2021-01-01 ENCOUNTER — HOSPITAL ENCOUNTER (OUTPATIENT)
Age: 86
Setting detail: SPECIMEN
Discharge: HOME | End: 2021-08-03
Payer: MEDICARE

## 2021-01-01 LAB
ANION GAP SERPL CALCULATED.3IONS-SCNC: 11 MMOL/L (ref 4–16)
ANION GAP SERPL CALCULATED.3IONS-SCNC: 14 MMOL/L (ref 4–16)
ANION GAP SERPL CALCULATED.3IONS-SCNC: 9 MMOL/L (ref 4–16)
BUN BLDV-MCNC: 23 MG/DL (ref 6–23)
BUN BLDV-MCNC: 23 MG/DL (ref 6–23)
BUN BLDV-MCNC: 29 MG/DL (ref 6–23)
CALCIUM SERPL-MCNC: 9.2 MG/DL (ref 8.3–10.6)
CALCIUM SERPL-MCNC: 9.6 MG/DL (ref 8.3–10.6)
CALCIUM SERPL-MCNC: 9.7 MG/DL (ref 8.3–10.6)
CHLORIDE BLD-SCNC: 101 MMOL/L (ref 99–110)
CHLORIDE BLD-SCNC: 101 MMOL/L (ref 99–110)
CHLORIDE BLD-SCNC: 99 MMOL/L (ref 99–110)
CO2: 29 MMOL/L (ref 21–32)
CO2: 31 MMOL/L (ref 21–32)
CO2: 31 MMOL/L (ref 21–32)
CREAT SERPL-MCNC: 1.4 MG/DL (ref 0.6–1.1)
CREAT SERPL-MCNC: 1.4 MG/DL (ref 0.6–1.1)
CREAT SERPL-MCNC: 1.5 MG/DL (ref 0.6–1.1)
GFR AFRICAN AMERICAN: 40 ML/MIN/1.73M2
GFR AFRICAN AMERICAN: 43 ML/MIN/1.73M2
GFR AFRICAN AMERICAN: 43 ML/MIN/1.73M2
GFR NON-AFRICAN AMERICAN: 33 ML/MIN/1.73M2
GFR NON-AFRICAN AMERICAN: 36 ML/MIN/1.73M2
GFR NON-AFRICAN AMERICAN: 36 ML/MIN/1.73M2
GLUCOSE BLD-MCNC: 121 MG/DL (ref 70–99)
GLUCOSE BLD-MCNC: 152 MG/DL (ref 70–99)
GLUCOSE BLD-MCNC: 95 MG/DL (ref 70–99)
POTASSIUM SERPL-SCNC: 4.3 MMOL/L (ref 3.5–5.1)
POTASSIUM SERPL-SCNC: 4.8 MMOL/L (ref 3.5–5.1)
POTASSIUM SERPL-SCNC: 5 MMOL/L (ref 3.5–5.1)
QUANTI TB1 MINUS NIL: 0.02 IU/ML (ref 0–0.34)
QUANTI TB2 MINUS NIL: 0 IU/ML (ref 0–0.34)
QUANTIFERON (R) TB GOLD (INCUBATED): NEGATIVE IU/ML
QUANTIFERON MITOGEN MINUS NIL: 7.6 IU/ML
QUANTIFERON NIL: 0.01 IU/ML
SODIUM BLD-SCNC: 141 MMOL/L (ref 135–145)
SODIUM BLD-SCNC: 142 MMOL/L (ref 135–145)
SODIUM BLD-SCNC: 143 MMOL/L (ref 135–145)

## 2021-01-01 PROCEDURE — 80048 BASIC METABOLIC PNL TOTAL CA: CPT

## 2021-01-01 PROCEDURE — 86480 TB TEST CELL IMMUN MEASURE: CPT

## 2021-01-01 PROCEDURE — 36415 COLL VENOUS BLD VENIPUNCTURE: CPT

## 2021-03-08 NOTE — PROGRESS NOTES
Nephrology Progress Note  9/16/2019 10:14 AM        Subjective:   Admit Date: 9/12/2019  PCP: Joaquim Eisenmenger, MD    Interval History: doing well    Diet: better    ROS:  No sob- going for bx - rt lung     Data:     Current meds:    magnesium oxide  400 mg Oral Daily    metoprolol tartrate  25 mg Oral TID    cefTRIAXone (ROCEPHIN) IV  1 g Intravenous Q24H    doxycycline hyclate  100 mg Oral 2 times per day    latanoprost  1 drop Both Eyes Nightly    pantoprazole  40 mg Oral Daily    sodium chloride flush  10 mL Intravenous 2 times per day    insulin lispro  0-12 Units Subcutaneous TID WC    insulin lispro  0-6 Units Subcutaneous Nightly      dextrose           I/O last 3 completed shifts:  In: -   Out: 200 [Urine:200]    CBC:   Recent Labs     09/14/19  0452 09/15/19  0600   WBC 14.4* 8.6   HGB 10.5* 10.3*    233          Recent Labs     09/14/19  0452 09/15/19  0600 09/16/19  0438    137 140   K 3.8 3.1* 4.7    103 106   CO2 22 24 25   BUN 18 17 13   CREATININE 1.3* 1.0 1.0   GLUCOSE 111* 115* 106*       Lab Results   Component Value Date    CALCIUM 8.6 09/16/2019    PHOS 3.7 09/14/2019       Objective:     Vitals: /89   Pulse 101   Temp 97.5 °F (36.4 °C) (Oral)   Resp 17   Ht 5' 7\" (1.702 m)   Wt 172 lb 8 oz (78.2 kg)   SpO2 99%   BMI 27.02 kg/m²     General appearance:  No distress  HEENT:  + mild pallor  Neck:  supple  Lungs:  CTa  Heart:  Seems RRR  Abdomen: soft  Extremities:  No edema       Problem List :         Impression :     1. HORTENCIA/ CKD stage 2- stable   2. K is ok  as well as mg replete  3. DM/ arrhythmia m  4. Rt lung mass - Bx today    Recommendation/Plan  :     1. Po food/ fluid after Bx  2. Manual BP  3.  Follow clinically       Pleasant Enter MD no

## 2022-01-01 ENCOUNTER — HOSPITAL ENCOUNTER (INPATIENT)
Age: 87
LOS: 3 days | DRG: 871 | End: 2022-03-14
Attending: GENERAL PRACTICE | Admitting: GENERAL PRACTICE
Payer: MEDICARE

## 2022-01-01 ENCOUNTER — APPOINTMENT (OUTPATIENT)
Dept: ULTRASOUND IMAGING | Age: 87
DRG: 871 | End: 2022-01-01
Payer: MEDICARE

## 2022-01-01 ENCOUNTER — APPOINTMENT (OUTPATIENT)
Dept: GENERAL RADIOLOGY | Age: 87
DRG: 871 | End: 2022-01-01
Payer: MEDICARE

## 2022-01-01 ENCOUNTER — APPOINTMENT (OUTPATIENT)
Dept: INTERVENTIONAL RADIOLOGY/VASCULAR | Age: 87
DRG: 871 | End: 2022-01-01
Payer: MEDICARE

## 2022-01-01 ENCOUNTER — APPOINTMENT (OUTPATIENT)
Dept: CT IMAGING | Age: 87
DRG: 871 | End: 2022-01-01
Payer: MEDICARE

## 2022-01-01 VITALS
SYSTOLIC BLOOD PRESSURE: 93 MMHG | BODY MASS INDEX: 21.35 KG/M2 | HEIGHT: 67 IN | HEART RATE: 85 BPM | OXYGEN SATURATION: 92 % | RESPIRATION RATE: 20 BRPM | TEMPERATURE: 97 F | DIASTOLIC BLOOD PRESSURE: 55 MMHG | WEIGHT: 136.02 LBS

## 2022-01-01 DIAGNOSIS — A41.9 SEPTICEMIA (HCC): Primary | ICD-10-CM

## 2022-01-01 DIAGNOSIS — G93.41 METABOLIC ENCEPHALOPATHY: ICD-10-CM

## 2022-01-01 DIAGNOSIS — R77.8 ELEVATED TROPONIN: ICD-10-CM

## 2022-01-01 DIAGNOSIS — J18.9 MULTIFOCAL PNEUMONIA: ICD-10-CM

## 2022-01-01 DIAGNOSIS — I73.9 PAD (PERIPHERAL ARTERY DISEASE) (HCC): ICD-10-CM

## 2022-01-01 DIAGNOSIS — E87.5 HYPERKALEMIA: ICD-10-CM

## 2022-01-01 DIAGNOSIS — R74.01 TRANSAMINITIS: ICD-10-CM

## 2022-01-01 DIAGNOSIS — J96.01 ACUTE RESPIRATORY FAILURE WITH HYPOXIA (HCC): ICD-10-CM

## 2022-01-01 DIAGNOSIS — N17.9 AKI (ACUTE KIDNEY INJURY) (HCC): ICD-10-CM

## 2022-01-01 DIAGNOSIS — J90 EXUDATIVE PLEURAL EFFUSION: ICD-10-CM

## 2022-01-01 DIAGNOSIS — J90 PLEURAL EFFUSION: ICD-10-CM

## 2022-01-01 DIAGNOSIS — E87.1 HYPONATREMIA: ICD-10-CM

## 2022-01-01 DIAGNOSIS — E87.20 METABOLIC ACIDOSIS: ICD-10-CM

## 2022-01-01 LAB
ADENOVIRUS DETECTION BY PCR: NOT DETECTED
ALBUMIN SERPL-MCNC: 2.6 GM/DL (ref 3.4–5)
ALBUMIN SERPL-MCNC: 2.7 GM/DL (ref 3.4–5)
ALBUMIN SERPL-MCNC: 2.7 GM/DL (ref 3.4–5)
ALBUMIN SERPL-MCNC: 3 GM/DL (ref 3.4–5)
ALBUMIN SERPL-MCNC: 3.5 GM/DL (ref 3.4–5)
ALP BLD-CCNC: 189 IU/L (ref 40–128)
ALP BLD-CCNC: 216 IU/L (ref 40–129)
ALT SERPL-CCNC: 378 U/L (ref 10–40)
ALT SERPL-CCNC: 687 U/L (ref 10–40)
AMYLASE FLUID: 24 U/L
ANION GAP SERPL CALCULATED.3IONS-SCNC: 16 MMOL/L (ref 4–16)
ANION GAP SERPL CALCULATED.3IONS-SCNC: 20 MMOL/L (ref 4–16)
ANION GAP SERPL CALCULATED.3IONS-SCNC: 21 MMOL/L (ref 4–16)
ANION GAP SERPL CALCULATED.3IONS-SCNC: 24 MMOL/L (ref 4–16)
ANION GAP SERPL CALCULATED.3IONS-SCNC: 26 MMOL/L (ref 4–16)
APTT: 35.7 SECONDS (ref 25.1–37.1)
AST SERPL-CCNC: 486 IU/L (ref 15–37)
AST SERPL-CCNC: 679 IU/L (ref 15–37)
BACTERIA: NEGATIVE /HPF
BASE EXCESS: 10 (ref 0–2.4)
BASE EXCESS: 12 (ref 0–2.4)
BASE EXCESS: 6 (ref 0–2.4)
BASOPHILS ABSOLUTE: 0 K/CU MM
BASOPHILS ABSOLUTE: 0 K/CU MM
BASOPHILS RELATIVE PERCENT: 0.1 % (ref 0–1)
BASOPHILS RELATIVE PERCENT: 0.1 % (ref 0–1)
BILIRUB SERPL-MCNC: 1.5 MG/DL (ref 0–1)
BILIRUB SERPL-MCNC: 1.6 MG/DL (ref 0–1)
BILIRUBIN DIRECT: 1.2 MG/DL (ref 0–0.3)
BILIRUBIN URINE: NEGATIVE MG/DL
BILIRUBIN, INDIRECT: 0.4 MG/DL (ref 0–0.7)
BLOOD, URINE: ABNORMAL
BORDETELLA PARAPERTUSSIS BY PCR: NOT DETECTED
BORDETELLA PERTUSSIS PCR: NOT DETECTED
BUN BLDV-MCNC: 63 MG/DL (ref 6–23)
BUN BLDV-MCNC: 71 MG/DL (ref 6–23)
BUN BLDV-MCNC: 76 MG/DL (ref 6–23)
BUN BLDV-MCNC: 78 MG/DL (ref 6–23)
BUN BLDV-MCNC: 82 MG/DL (ref 6–23)
CALCIUM SERPL-MCNC: 8.1 MG/DL (ref 8.3–10.6)
CALCIUM SERPL-MCNC: 8.3 MG/DL (ref 8.3–10.6)
CALCIUM SERPL-MCNC: 9 MG/DL (ref 8.3–10.6)
CALCIUM SERPL-MCNC: 9.3 MG/DL (ref 8.3–10.6)
CALCIUM SERPL-MCNC: 9.6 MG/DL (ref 8.3–10.6)
CARBON MONOXIDE, BLOOD: 1.3 % (ref 0–5)
CHLAMYDOPHILA PNEUMONIA PCR: NOT DETECTED
CHLORIDE BLD-SCNC: 82 MMOL/L (ref 99–110)
CHLORIDE BLD-SCNC: 86 MMOL/L (ref 99–110)
CHLORIDE BLD-SCNC: 91 MMOL/L (ref 99–110)
CHLORIDE BLD-SCNC: 93 MMOL/L (ref 99–110)
CHLORIDE BLD-SCNC: 98 MMOL/L (ref 99–110)
CHLORIDE URINE RANDOM: 28 MMOL/L (ref 43–210)
CLARITY: CLEAR
CO2 CONTENT: 19.2 MMOL/L (ref 19–24)
CO2: 13 MMOL/L (ref 21–32)
CO2: 14 MMOL/L (ref 21–32)
CO2: 19 MMOL/L (ref 21–32)
CO2: 20 MMOL/L (ref 21–32)
CO2: 24 MMOL/L (ref 21–32)
COLOR: YELLOW
COMMENT: ABNORMAL
CORONAVIRUS 229E PCR: NOT DETECTED
CORONAVIRUS HKU1 PCR: NOT DETECTED
CORONAVIRUS NL63 PCR: NOT DETECTED
CORONAVIRUS OC43 PCR: NOT DETECTED
CREAT SERPL-MCNC: 2.9 MG/DL (ref 0.6–1.1)
CREAT SERPL-MCNC: 2.9 MG/DL (ref 0.6–1.1)
CREAT SERPL-MCNC: 3 MG/DL (ref 0.6–1.1)
CREAT SERPL-MCNC: 3.1 MG/DL (ref 0.6–1.1)
CREAT SERPL-MCNC: 3.4 MG/DL (ref 0.6–1.1)
CREATININE URINE: 65.1 MG/DL (ref 28–217)
CULTURE: NORMAL
CULTURE: NORMAL
DIFFERENTIAL TYPE: ABNORMAL
DIFFERENTIAL TYPE: ABNORMAL
DOSE AMOUNT: NORMAL
DOSE AMOUNT: NORMAL
DOSE TIME: NORMAL
DOSE TIME: NORMAL
EKG ATRIAL RATE: 51 BPM
EKG DIAGNOSIS: NORMAL
EKG P AXIS: 56 DEGREES
EKG P-R INTERVAL: 186 MS
EKG Q-T INTERVAL: 572 MS
EKG QRS DURATION: 208 MS
EKG QTC CALCULATION (BAZETT): 617 MS
EKG R AXIS: -87 DEGREES
EKG T AXIS: 99 DEGREES
EKG VENTRICULAR RATE: 70 BPM
EOSINOPHILS ABSOLUTE: 0 K/CU MM
EOSINOPHILS ABSOLUTE: 0 K/CU MM
EOSINOPHILS RELATIVE PERCENT: 0 % (ref 0–3)
EOSINOPHILS RELATIVE PERCENT: 0 % (ref 0–3)
FLUID TYPE: NORMAL INDEX
GFR AFRICAN AMERICAN: 16 ML/MIN/1.73M2
GFR AFRICAN AMERICAN: 17 ML/MIN/1.73M2
GFR AFRICAN AMERICAN: 18 ML/MIN/1.73M2
GFR AFRICAN AMERICAN: 19 ML/MIN/1.73M2
GFR AFRICAN AMERICAN: 19 ML/MIN/1.73M2
GFR NON-AFRICAN AMERICAN: 13 ML/MIN/1.73M2
GFR NON-AFRICAN AMERICAN: 14 ML/MIN/1.73M2
GFR NON-AFRICAN AMERICAN: 15 ML/MIN/1.73M2
GLUCOSE BLD-MCNC: 101 MG/DL (ref 70–99)
GLUCOSE BLD-MCNC: 102 MG/DL (ref 70–99)
GLUCOSE BLD-MCNC: 104 MG/DL (ref 70–99)
GLUCOSE BLD-MCNC: 131 MG/DL (ref 70–99)
GLUCOSE BLD-MCNC: 135 MG/DL (ref 70–99)
GLUCOSE BLD-MCNC: 138 MG/DL (ref 70–99)
GLUCOSE BLD-MCNC: 139 MG/DL (ref 70–99)
GLUCOSE BLD-MCNC: 162 MG/DL (ref 70–99)
GLUCOSE BLD-MCNC: 174 MG/DL (ref 70–99)
GLUCOSE BLD-MCNC: 176 MG/DL (ref 70–99)
GLUCOSE BLD-MCNC: 179 MG/DL (ref 70–99)
GLUCOSE BLD-MCNC: 186 MG/DL (ref 70–99)
GLUCOSE BLD-MCNC: 188 MG/DL (ref 70–99)
GLUCOSE BLD-MCNC: 189 MG/DL (ref 70–99)
GLUCOSE BLD-MCNC: 57 MG/DL (ref 70–99)
GLUCOSE BLD-MCNC: 59 MG/DL (ref 70–99)
GLUCOSE BLD-MCNC: 87 MG/DL (ref 70–99)
GLUCOSE BLD-MCNC: 88 MG/DL (ref 70–99)
GLUCOSE BLD-MCNC: 92 MG/DL (ref 70–99)
GLUCOSE BLD-MCNC: 96 MG/DL (ref 70–99)
GLUCOSE BLD-MCNC: 99 MG/DL (ref 70–99)
GLUCOSE, URINE: NEGATIVE MG/DL
HAV IGM SER IA-ACNC: NON REACTIVE
HCO3 ARTERIAL: 18.2 MMOL/L (ref 18–23)
HCO3 VENOUS: 17 MMOL/L (ref 19–25)
HCO3 VENOUS: 18.7 MMOL/L (ref 19–25)
HCT VFR BLD CALC: 31.9 % (ref 37–47)
HCT VFR BLD CALC: 32.5 % (ref 37–47)
HCT VFR BLD CALC: 35 % (ref 37–47)
HCT VFR BLD CALC: 41.3 % (ref 37–47)
HEMOGLOBIN: 10.2 GM/DL (ref 12.5–16)
HEMOGLOBIN: 10.3 GM/DL (ref 12.5–16)
HEMOGLOBIN: 11 GM/DL (ref 12.5–16)
HEMOGLOBIN: 12.3 GM/DL (ref 12.5–16)
HEPATITIS B CORE IGM ANTIBODY: NON REACTIVE
HEPATITIS B SURFACE ANTIGEN: NON REACTIVE
HEPATITIS C ANTIBODY: NON REACTIVE
HUMAN METAPNEUMOVIRUS PCR: NOT DETECTED
HYALINE CASTS: 2 /LPF
IMMATURE NEUTROPHIL %: 0.3 % (ref 0–0.43)
IMMATURE NEUTROPHIL %: 0.6 % (ref 0–0.43)
INFLUENZA A BY PCR: NOT DETECTED
INFLUENZA A H1 (2009) PCR: NOT DETECTED
INFLUENZA A H1 PANDEMIC PCR: NOT DETECTED
INFLUENZA A H3 PCR: NOT DETECTED
INFLUENZA B BY PCR: NOT DETECTED
INR BLD: 1.83 INDEX
KETONES, URINE: ABNORMAL MG/DL
LACTATE DEHYDROGENASE, FLUID: 123 IU/L
LACTATE: 1.6 MMOL/L (ref 0.4–2)
LACTATE: 1.6 MMOL/L (ref 0.4–2)
LACTATE: 8.4 MMOL/L (ref 0.4–2)
LEUKOCYTE ESTERASE, URINE: ABNORMAL
LYMPHOCYTES ABSOLUTE: 0.3 K/CU MM
LYMPHOCYTES ABSOLUTE: 0.4 K/CU MM
LYMPHOCYTES RELATIVE PERCENT: 1.9 % (ref 24–44)
LYMPHOCYTES RELATIVE PERCENT: 2.3 % (ref 24–44)
LYMPHOCYTES, BODY FLUID: 48 %
Lab: NORMAL
Lab: NORMAL
MAGNESIUM: 1.9 MG/DL (ref 1.8–2.4)
MCH RBC QN AUTO: 25.5 PG (ref 27–31)
MCH RBC QN AUTO: 25.6 PG (ref 27–31)
MCH RBC QN AUTO: 25.6 PG (ref 27–31)
MCH RBC QN AUTO: 26.3 PG (ref 27–31)
MCHC RBC AUTO-ENTMCNC: 29.8 % (ref 32–36)
MCHC RBC AUTO-ENTMCNC: 31.4 % (ref 32–36)
MCHC RBC AUTO-ENTMCNC: 31.4 % (ref 32–36)
MCHC RBC AUTO-ENTMCNC: 32.3 % (ref 32–36)
MCV RBC AUTO: 81.4 FL (ref 78–100)
MCV RBC AUTO: 81.4 FL (ref 78–100)
MCV RBC AUTO: 81.7 FL (ref 78–100)
MCV RBC AUTO: 85.7 FL (ref 78–100)
MESOTHELIAL FLUID: 0 /100 WBC
METHEMOGLOBIN ARTERIAL: 0.6 %
MONOCYTE, FLUID: 7 %
MONOCYTES ABSOLUTE: 0.4 K/CU MM
MONOCYTES ABSOLUTE: 0.7 K/CU MM
MONOCYTES RELATIVE PERCENT: 2.9 % (ref 0–4)
MONOCYTES RELATIVE PERCENT: 4.2 % (ref 0–4)
MUCUS: ABNORMAL HPF
MYCOPLASMA PNEUMONIAE PCR: NOT DETECTED
NEUTROPHIL, FLUID: 45 %
NITRITE URINE, QUANTITATIVE: NEGATIVE
NUCLEATED RBC %: 0 %
NUCLEATED RBC %: 0.2 %
O2 SAT, VEN: 49.8 % (ref 50–70)
O2 SAT, VEN: 75.7 % (ref 50–70)
O2 SATURATION: 93.6 % (ref 96–97)
OTHER CELLS FLUID: 0
PARAINFLUENZA 1 PCR: NOT DETECTED
PARAINFLUENZA 2 PCR: NOT DETECTED
PARAINFLUENZA 3 PCR: ABNORMAL
PARAINFLUENZA 4 PCR: NOT DETECTED
PCO2 ARTERIAL: 33 MMHG (ref 32–45)
PCO2, VEN: 50 MMHG (ref 38–52)
PCO2, VEN: 51 MMHG (ref 38–52)
PDW BLD-RTO: 19.5 % (ref 11.7–14.9)
PDW BLD-RTO: 19.6 % (ref 11.7–14.9)
PDW BLD-RTO: 19.9 % (ref 11.7–14.9)
PDW BLD-RTO: 20.1 % (ref 11.7–14.9)
PH BLOOD: 7.35 (ref 7.34–7.45)
PH VENOUS: 7.13 (ref 7.32–7.42)
PH VENOUS: 7.18 (ref 7.32–7.42)
PH, URINE: 5 (ref 5–8)
PHOSPHORUS: 4.6 MG/DL (ref 2.5–4.9)
PHOSPHORUS: 4.9 MG/DL (ref 2.5–4.9)
PHOSPHORUS: 5 MG/DL (ref 2.5–4.9)
PLATELET # BLD: 102 K/CU MM (ref 140–440)
PLATELET # BLD: 129 K/CU MM (ref 140–440)
PLATELET # BLD: 157 K/CU MM (ref 140–440)
PLATELET # BLD: 203 K/CU MM (ref 140–440)
PMV BLD AUTO: 11.8 FL (ref 7.5–11.1)
PMV BLD AUTO: 12.1 FL (ref 7.5–11.1)
PMV BLD AUTO: 12.1 FL (ref 7.5–11.1)
PO2 ARTERIAL: 80 MMHG (ref 75–100)
PO2, VEN: 40 MMHG (ref 28–48)
PO2, VEN: 54 MMHG (ref 28–48)
POTASSIUM SERPL-SCNC: 3.2 MMOL/L (ref 3.5–5.1)
POTASSIUM SERPL-SCNC: 4.1 MMOL/L (ref 3.5–5.1)
POTASSIUM SERPL-SCNC: 5.7 MMOL/L (ref 3.5–5.1)
POTASSIUM SERPL-SCNC: 6 MMOL/L (ref 3.5–5.1)
POTASSIUM SERPL-SCNC: 6.1 MMOL/L (ref 3.5–5.1)
POTASSIUM, UR: 60.8 MMOL/L (ref 22–119)
PRO-BNP: ABNORMAL PG/ML
PRO-BNP: ABNORMAL PG/ML
PROCALCITONIN: 0.64
PROCALCITONIN: 0.78
PROCALCITONIN: 0.89
PROCALCITONIN: 1.07
PROT/CREAT RATIO, UR: 0.5
PROTEIN FLUID: 1.8 GM/DL
PROTEIN UA: NEGATIVE MG/DL
PROTHROMBIN TIME: 23.7 SECONDS (ref 11.7–14.5)
RBC # BLD: 3.92 M/CU MM (ref 4.2–5.4)
RBC # BLD: 3.98 M/CU MM (ref 4.2–5.4)
RBC # BLD: 4.3 M/CU MM (ref 4.2–5.4)
RBC # BLD: 4.82 M/CU MM (ref 4.2–5.4)
RBC FLUID: 3000 /CU MM
RBC URINE: 7 /HPF (ref 0–6)
RHINOVIRUS ENTEROVIRUS PCR: NOT DETECTED
RSV PCR: NOT DETECTED
SARS-COV-2, NAAT: NOT DETECTED
SARS-COV-2: NOT DETECTED
SEGMENTED NEUTROPHILS ABSOLUTE COUNT: 14.6 K/CU MM
SEGMENTED NEUTROPHILS ABSOLUTE COUNT: 15.4 K/CU MM
SEGMENTED NEUTROPHILS RELATIVE PERCENT: 92.8 % (ref 36–66)
SEGMENTED NEUTROPHILS RELATIVE PERCENT: 94.8 % (ref 36–66)
SODIUM BLD-SCNC: 121 MMOL/L (ref 135–145)
SODIUM BLD-SCNC: 124 MMOL/L (ref 135–145)
SODIUM BLD-SCNC: 131 MMOL/L (ref 135–145)
SODIUM BLD-SCNC: 133 MMOL/L (ref 135–145)
SODIUM BLD-SCNC: 138 MMOL/L (ref 135–145)
SODIUM URINE: 13 MMOL/L (ref 35–167)
SOURCE: NORMAL
SPECIFIC GRAVITY UA: 1.02 (ref 1–1.03)
SPECIMEN: NORMAL
SPECIMEN: NORMAL
SQUAMOUS EPITHELIAL: 2 /HPF
TOTAL IMMATURE NEUTOROPHIL: 0.05 K/CU MM
TOTAL IMMATURE NEUTOROPHIL: 0.1 K/CU MM
TOTAL NUCLEATED RBC: 0 K/CU MM
TOTAL NUCLEATED RBC: 0 K/CU MM
TOTAL PROTEIN: 5.9 GM/DL (ref 6.4–8.2)
TOTAL PROTEIN: 8.1 GM/DL (ref 6.4–8.2)
TRICHOMONAS: ABNORMAL /HPF
TROPONIN T: 0.03 NG/ML
URINE TOTAL PROTEIN: 34.2 MG/DL
UROBILINOGEN, URINE: 0.2 MG/DL (ref 0.2–1)
VANCOMYCIN RANDOM: 11.7 UG/ML
VANCOMYCIN RANDOM: 15.5 UG/ML
WBC # BLD: 10.6 K/CU MM (ref 4–10.5)
WBC # BLD: 11.7 K/CU MM (ref 4–10.5)
WBC # BLD: 15.4 K/CU MM (ref 4–10.5)
WBC # BLD: 16.6 K/CU MM (ref 4–10.5)
WBC CLUMP: ABNORMAL /HPF
WBC FLUID: 77 /CU MM
WBC UA: 43 /HPF (ref 0–5)

## 2022-01-01 PROCEDURE — 80202 ASSAY OF VANCOMYCIN: CPT

## 2022-01-01 PROCEDURE — 2500000003 HC RX 250 WO HCPCS: Performed by: INTERNAL MEDICINE

## 2022-01-01 PROCEDURE — 80069 RENAL FUNCTION PANEL: CPT

## 2022-01-01 PROCEDURE — 51702 INSERT TEMP BLADDER CATH: CPT

## 2022-01-01 PROCEDURE — 96366 THER/PROPH/DIAG IV INF ADDON: CPT

## 2022-01-01 PROCEDURE — 83880 ASSAY OF NATRIURETIC PEPTIDE: CPT

## 2022-01-01 PROCEDURE — 94660 CPAP INITIATION&MGMT: CPT

## 2022-01-01 PROCEDURE — 2580000003 HC RX 258: Performed by: GENERAL PRACTICE

## 2022-01-01 PROCEDURE — 94640 AIRWAY INHALATION TREATMENT: CPT

## 2022-01-01 PROCEDURE — 2580000003 HC RX 258: Performed by: INTERNAL MEDICINE

## 2022-01-01 PROCEDURE — 82805 BLOOD GASES W/O2 SATURATION: CPT

## 2022-01-01 PROCEDURE — 94761 N-INVAS EAR/PLS OXIMETRY MLT: CPT

## 2022-01-01 PROCEDURE — 83605 ASSAY OF LACTIC ACID: CPT

## 2022-01-01 PROCEDURE — 99284 EMERGENCY DEPT VISIT MOD MDM: CPT

## 2022-01-01 PROCEDURE — 2100000000 HC CCU R&B

## 2022-01-01 PROCEDURE — 6360000002 HC RX W HCPCS: Performed by: INTERNAL MEDICINE

## 2022-01-01 PROCEDURE — 2700000000 HC OXYGEN THERAPY PER DAY

## 2022-01-01 PROCEDURE — 1200000000 HC SEMI PRIVATE

## 2022-01-01 PROCEDURE — 80076 HEPATIC FUNCTION PANEL: CPT

## 2022-01-01 PROCEDURE — 82150 ASSAY OF AMYLASE: CPT

## 2022-01-01 PROCEDURE — 72170 X-RAY EXAM OF PELVIS: CPT

## 2022-01-01 PROCEDURE — 76775 US EXAM ABDO BACK WALL LIM: CPT

## 2022-01-01 PROCEDURE — 36415 COLL VENOUS BLD VENIPUNCTURE: CPT

## 2022-01-01 PROCEDURE — 80074 ACUTE HEPATITIS PANEL: CPT

## 2022-01-01 PROCEDURE — 87040 BLOOD CULTURE FOR BACTERIA: CPT

## 2022-01-01 PROCEDURE — 81001 URINALYSIS AUTO W/SCOPE: CPT

## 2022-01-01 PROCEDURE — 84157 ASSAY OF PROTEIN OTHER: CPT

## 2022-01-01 PROCEDURE — 82436 ASSAY OF URINE CHLORIDE: CPT

## 2022-01-01 PROCEDURE — 84300 ASSAY OF URINE SODIUM: CPT

## 2022-01-01 PROCEDURE — 84145 PROCALCITONIN (PCT): CPT

## 2022-01-01 PROCEDURE — 6360000002 HC RX W HCPCS: Performed by: PHYSICIAN ASSISTANT

## 2022-01-01 PROCEDURE — 6360000002 HC RX W HCPCS: Performed by: GENERAL PRACTICE

## 2022-01-01 PROCEDURE — 92610 EVALUATE SWALLOWING FUNCTION: CPT

## 2022-01-01 PROCEDURE — 82945 GLUCOSE OTHER FLUID: CPT

## 2022-01-01 PROCEDURE — 82570 ASSAY OF URINE CREATININE: CPT

## 2022-01-01 PROCEDURE — 2709999900 HC NON-CHARGEABLE SUPPLY

## 2022-01-01 PROCEDURE — 85027 COMPLETE CBC AUTOMATED: CPT

## 2022-01-01 PROCEDURE — 71045 X-RAY EXAM CHEST 1 VIEW: CPT

## 2022-01-01 PROCEDURE — 76705 ECHO EXAM OF ABDOMEN: CPT

## 2022-01-01 PROCEDURE — 80048 BASIC METABOLIC PNL TOTAL CA: CPT

## 2022-01-01 PROCEDURE — 2500000003 HC RX 250 WO HCPCS: Performed by: PHYSICIAN ASSISTANT

## 2022-01-01 PROCEDURE — 88305 TISSUE EXAM BY PATHOLOGIST: CPT

## 2022-01-01 PROCEDURE — 96365 THER/PROPH/DIAG IV INF INIT: CPT

## 2022-01-01 PROCEDURE — 36592 COLLECT BLOOD FROM PICC: CPT

## 2022-01-01 PROCEDURE — 83615 LACTATE (LD) (LDH) ENZYME: CPT

## 2022-01-01 PROCEDURE — 6370000000 HC RX 637 (ALT 250 FOR IP): Performed by: INTERNAL MEDICINE

## 2022-01-01 PROCEDURE — 36600 WITHDRAWAL OF ARTERIAL BLOOD: CPT

## 2022-01-01 PROCEDURE — 84100 ASSAY OF PHOSPHORUS: CPT

## 2022-01-01 PROCEDURE — 6370000000 HC RX 637 (ALT 250 FOR IP): Performed by: PHYSICIAN ASSISTANT

## 2022-01-01 PROCEDURE — 84156 ASSAY OF PROTEIN URINE: CPT

## 2022-01-01 PROCEDURE — 88108 CYTOPATH CONCENTRATE TECH: CPT

## 2022-01-01 PROCEDURE — 82962 GLUCOSE BLOOD TEST: CPT

## 2022-01-01 PROCEDURE — 2580000003 HC RX 258: Performed by: PHYSICIAN ASSISTANT

## 2022-01-01 PROCEDURE — C9113 INJ PANTOPRAZOLE SODIUM, VIA: HCPCS | Performed by: PHYSICIAN ASSISTANT

## 2022-01-01 PROCEDURE — 0W993ZX DRAINAGE OF RIGHT PLEURAL CAVITY, PERCUTANEOUS APPROACH, DIAGNOSTIC: ICD-10-PCS | Performed by: RADIOLOGY

## 2022-01-01 PROCEDURE — 93005 ELECTROCARDIOGRAM TRACING: CPT | Performed by: PHYSICIAN ASSISTANT

## 2022-01-01 PROCEDURE — 83735 ASSAY OF MAGNESIUM: CPT

## 2022-01-01 PROCEDURE — 85730 THROMBOPLASTIN TIME PARTIAL: CPT

## 2022-01-01 PROCEDURE — 93010 ELECTROCARDIOGRAM REPORT: CPT | Performed by: INTERNAL MEDICINE

## 2022-01-01 PROCEDURE — 82803 BLOOD GASES ANY COMBINATION: CPT

## 2022-01-01 PROCEDURE — 70450 CT HEAD/BRAIN W/O DYE: CPT

## 2022-01-01 PROCEDURE — 93925 LOWER EXTREMITY STUDY: CPT

## 2022-01-01 PROCEDURE — 0202U NFCT DS 22 TRGT SARS-COV-2: CPT

## 2022-01-01 PROCEDURE — 80053 COMPREHEN METABOLIC PANEL: CPT

## 2022-01-01 PROCEDURE — 85610 PROTHROMBIN TIME: CPT

## 2022-01-01 PROCEDURE — 71250 CT THORAX DX C-: CPT

## 2022-01-01 PROCEDURE — 85025 COMPLETE CBC W/AUTO DIFF WBC: CPT

## 2022-01-01 PROCEDURE — 32555 ASPIRATE PLEURA W/ IMAGING: CPT

## 2022-01-01 PROCEDURE — 96367 TX/PROPH/DG ADDL SEQ IV INF: CPT

## 2022-01-01 PROCEDURE — 93308 TTE F-UP OR LMTD: CPT

## 2022-01-01 PROCEDURE — 89051 BODY FLUID CELL COUNT: CPT

## 2022-01-01 PROCEDURE — 6370000000 HC RX 637 (ALT 250 FOR IP): Performed by: GENERAL PRACTICE

## 2022-01-01 PROCEDURE — 87086 URINE CULTURE/COLONY COUNT: CPT

## 2022-01-01 PROCEDURE — 96375 TX/PRO/DX INJ NEW DRUG ADDON: CPT

## 2022-01-01 PROCEDURE — 36556 INSERT NON-TUNNEL CV CATH: CPT

## 2022-01-01 PROCEDURE — 84133 ASSAY OF URINE POTASSIUM: CPT

## 2022-01-01 PROCEDURE — 87635 SARS-COV-2 COVID-19 AMP PRB: CPT

## 2022-01-01 PROCEDURE — 84484 ASSAY OF TROPONIN QUANT: CPT

## 2022-01-01 PROCEDURE — C1729 CATH, DRAINAGE: HCPCS

## 2022-01-01 PROCEDURE — 2500000003 HC RX 250 WO HCPCS

## 2022-01-01 PROCEDURE — 2500000003 HC RX 250 WO HCPCS: Performed by: GENERAL PRACTICE

## 2022-01-01 PROCEDURE — 02HV33Z INSERTION OF INFUSION DEVICE INTO SUPERIOR VENA CAVA, PERCUTANEOUS APPROACH: ICD-10-PCS | Performed by: GENERAL PRACTICE

## 2022-01-01 RX ORDER — HEPARIN SODIUM 5000 [USP'U]/ML
5000 INJECTION, SOLUTION INTRAVENOUS; SUBCUTANEOUS EVERY 8 HOURS SCHEDULED
Status: DISCONTINUED | OUTPATIENT
Start: 2022-01-01 | End: 2022-03-15 | Stop reason: HOSPADM

## 2022-01-01 RX ORDER — DEXTROSE MONOHYDRATE 25 G/50ML
25 INJECTION, SOLUTION INTRAVENOUS PRN
Status: DISCONTINUED | OUTPATIENT
Start: 2022-01-01 | End: 2022-01-01 | Stop reason: RX

## 2022-01-01 RX ORDER — DEXTROSE MONOHYDRATE 25 G/50ML
12.5 INJECTION, SOLUTION INTRAVENOUS PRN
Status: DISCONTINUED | OUTPATIENT
Start: 2022-01-01 | End: 2022-01-01 | Stop reason: RX

## 2022-01-01 RX ORDER — SODIUM CHLORIDE 9 MG/ML
25 INJECTION, SOLUTION INTRAVENOUS PRN
Status: DISCONTINUED | OUTPATIENT
Start: 2022-01-01 | End: 2022-03-15 | Stop reason: HOSPADM

## 2022-01-01 RX ORDER — NICOTINE POLACRILEX 4 MG
15 LOZENGE BUCCAL PRN
Status: DISCONTINUED | OUTPATIENT
Start: 2022-01-01 | End: 2022-03-15 | Stop reason: HOSPADM

## 2022-01-01 RX ORDER — FUROSEMIDE 10 MG/ML
60 INJECTION INTRAMUSCULAR; INTRAVENOUS ONCE
Status: COMPLETED | OUTPATIENT
Start: 2022-01-01 | End: 2022-01-01

## 2022-01-01 RX ORDER — POLYETHYLENE GLYCOL 3350 17 G/17G
17 POWDER, FOR SOLUTION ORAL DAILY PRN
Status: DISCONTINUED | OUTPATIENT
Start: 2022-01-01 | End: 2022-03-15 | Stop reason: HOSPADM

## 2022-01-01 RX ORDER — ACETAMINOPHEN 325 MG/1
650 TABLET ORAL EVERY 6 HOURS PRN
Status: DISCONTINUED | OUTPATIENT
Start: 2022-01-01 | End: 2022-03-15 | Stop reason: HOSPADM

## 2022-01-01 RX ORDER — FUROSEMIDE 10 MG/ML
10 INJECTION INTRAMUSCULAR; INTRAVENOUS ONCE
Status: DISCONTINUED | OUTPATIENT
Start: 2022-01-01 | End: 2022-01-01

## 2022-01-01 RX ORDER — ONDANSETRON 4 MG/1
4 TABLET, ORALLY DISINTEGRATING ORAL EVERY 8 HOURS PRN
Status: DISCONTINUED | OUTPATIENT
Start: 2022-01-01 | End: 2022-03-15 | Stop reason: HOSPADM

## 2022-01-01 RX ORDER — 0.9 % SODIUM CHLORIDE 0.9 %
500 INTRAVENOUS SOLUTION INTRAVENOUS ONCE
Status: COMPLETED | OUTPATIENT
Start: 2022-01-01 | End: 2022-01-01

## 2022-01-01 RX ORDER — PANTOPRAZOLE SODIUM 40 MG/10ML
40 INJECTION, POWDER, LYOPHILIZED, FOR SOLUTION INTRAVENOUS DAILY
Status: DISCONTINUED | OUTPATIENT
Start: 2022-01-01 | End: 2022-03-15 | Stop reason: HOSPADM

## 2022-01-01 RX ORDER — 0.9 % SODIUM CHLORIDE 0.9 %
500 INTRAVENOUS SOLUTION INTRAVENOUS ONCE
Status: DISCONTINUED | OUTPATIENT
Start: 2022-01-01 | End: 2022-03-15 | Stop reason: HOSPADM

## 2022-01-01 RX ORDER — MIDODRINE HYDROCHLORIDE 5 MG/1
10 TABLET ORAL ONCE
Status: DISCONTINUED | OUTPATIENT
Start: 2022-01-01 | End: 2022-01-01

## 2022-01-01 RX ORDER — DEXTROSE MONOHYDRATE 50 MG/ML
100 INJECTION, SOLUTION INTRAVENOUS PRN
Status: DISCONTINUED | OUTPATIENT
Start: 2022-01-01 | End: 2022-03-15 | Stop reason: HOSPADM

## 2022-01-01 RX ORDER — NICOTINE POLACRILEX 4 MG
15 LOZENGE BUCCAL PRN
Status: DISCONTINUED | OUTPATIENT
Start: 2022-01-01 | End: 2022-01-01

## 2022-01-01 RX ORDER — LORAZEPAM 2 MG/ML
0.5 INJECTION INTRAMUSCULAR EVERY 6 HOURS PRN
Status: DISCONTINUED | OUTPATIENT
Start: 2022-01-01 | End: 2022-03-15 | Stop reason: HOSPADM

## 2022-01-01 RX ORDER — SODIUM CHLORIDE 0.9 % (FLUSH) 0.9 %
5-40 SYRINGE (ML) INJECTION EVERY 12 HOURS SCHEDULED
Status: DISCONTINUED | OUTPATIENT
Start: 2022-01-01 | End: 2022-03-15 | Stop reason: HOSPADM

## 2022-01-01 RX ORDER — CALCIUM GLUCONATE 94 MG/ML
1000 INJECTION, SOLUTION INTRAVENOUS ONCE
Status: COMPLETED | OUTPATIENT
Start: 2022-01-01 | End: 2022-01-01

## 2022-01-01 RX ORDER — NOREPINEPHRINE BIT/0.9 % NACL 16MG/250ML
1-100 INFUSION BOTTLE (ML) INTRAVENOUS CONTINUOUS
Status: DISCONTINUED | OUTPATIENT
Start: 2022-01-01 | End: 2022-03-15 | Stop reason: HOSPADM

## 2022-01-01 RX ORDER — DEXTROSE MONOHYDRATE 50 MG/ML
100 INJECTION, SOLUTION INTRAVENOUS PRN
Status: DISCONTINUED | OUTPATIENT
Start: 2022-01-01 | End: 2022-01-01

## 2022-01-01 RX ORDER — DEXTROSE MONOHYDRATE 25 G/50ML
25 INJECTION, SOLUTION INTRAVENOUS ONCE
Status: COMPLETED | OUTPATIENT
Start: 2022-01-01 | End: 2022-01-01

## 2022-01-01 RX ORDER — ACETAMINOPHEN 650 MG/1
650 SUPPOSITORY RECTAL EVERY 6 HOURS PRN
Status: DISCONTINUED | OUTPATIENT
Start: 2022-01-01 | End: 2022-03-15 | Stop reason: HOSPADM

## 2022-01-01 RX ORDER — 0.9 % SODIUM CHLORIDE 0.9 %
1000 INTRAVENOUS SOLUTION INTRAVENOUS ONCE
Status: COMPLETED | OUTPATIENT
Start: 2022-01-01 | End: 2022-01-01

## 2022-01-01 RX ORDER — AMIODARONE HYDROCHLORIDE 200 MG/1
200 TABLET ORAL DAILY
Status: DISCONTINUED | OUTPATIENT
Start: 2022-01-01 | End: 2022-03-15 | Stop reason: HOSPADM

## 2022-01-01 RX ORDER — ALBUTEROL SULFATE 90 UG/1
2 AEROSOL, METERED RESPIRATORY (INHALATION) 4 TIMES DAILY
Status: DISCONTINUED | OUTPATIENT
Start: 2022-01-01 | End: 2022-03-15 | Stop reason: HOSPADM

## 2022-01-01 RX ORDER — SCOLOPAMINE TRANSDERMAL SYSTEM 1 MG/1
1 PATCH, EXTENDED RELEASE TRANSDERMAL
Status: DISCONTINUED | OUTPATIENT
Start: 2022-01-01 | End: 2022-03-15 | Stop reason: HOSPADM

## 2022-01-01 RX ORDER — SODIUM CHLORIDE 0.9 % (FLUSH) 0.9 %
5-40 SYRINGE (ML) INJECTION PRN
Status: DISCONTINUED | OUTPATIENT
Start: 2022-01-01 | End: 2022-03-15 | Stop reason: HOSPADM

## 2022-01-01 RX ORDER — ONDANSETRON 2 MG/ML
4 INJECTION INTRAMUSCULAR; INTRAVENOUS EVERY 6 HOURS PRN
Status: DISCONTINUED | OUTPATIENT
Start: 2022-01-01 | End: 2022-03-15 | Stop reason: HOSPADM

## 2022-01-01 RX ORDER — DEXTROSE MONOHYDRATE 25 G/50ML
INJECTION, SOLUTION INTRAVENOUS
Status: COMPLETED
Start: 2022-01-01 | End: 2022-01-01

## 2022-01-01 RX ORDER — FUROSEMIDE 10 MG/ML
40 INJECTION INTRAMUSCULAR; INTRAVENOUS ONCE
Status: COMPLETED | OUTPATIENT
Start: 2022-01-01 | End: 2022-01-01

## 2022-01-01 RX ADMIN — ALBUTEROL SULFATE 2 PUFF: 90 AEROSOL, METERED RESPIRATORY (INHALATION) at 07:50

## 2022-01-01 RX ADMIN — PANTOPRAZOLE SODIUM 40 MG: 40 INJECTION, POWDER, FOR SOLUTION INTRAVENOUS at 09:27

## 2022-01-01 RX ADMIN — ALBUTEROL SULFATE 2 PUFF: 90 AEROSOL, METERED RESPIRATORY (INHALATION) at 19:30

## 2022-01-01 RX ADMIN — HEPARIN SODIUM 5000 UNITS: 5000 INJECTION INTRAVENOUS; SUBCUTANEOUS at 14:48

## 2022-01-01 RX ADMIN — DEXTROSE MONOHYDRATE 50 ML: 25 INJECTION, SOLUTION INTRAVENOUS at 17:27

## 2022-01-01 RX ADMIN — HEPARIN SODIUM 5000 UNITS: 5000 INJECTION INTRAVENOUS; SUBCUTANEOUS at 15:26

## 2022-01-01 RX ADMIN — HEPARIN SODIUM 5000 UNITS: 5000 INJECTION INTRAVENOUS; SUBCUTANEOUS at 21:38

## 2022-01-01 RX ADMIN — ALBUTEROL SULFATE 2 PUFF: 90 AEROSOL, METERED RESPIRATORY (INHALATION) at 19:37

## 2022-01-01 RX ADMIN — SODIUM BICARBONATE: 84 INJECTION, SOLUTION INTRAVENOUS at 19:43

## 2022-01-01 RX ADMIN — SODIUM CHLORIDE, PRESERVATIVE FREE 10 ML: 5 INJECTION INTRAVENOUS at 00:18

## 2022-01-01 RX ADMIN — CEFEPIME 1000 MG: 1 INJECTION, POWDER, FOR SOLUTION INTRAMUSCULAR; INTRAVENOUS at 15:42

## 2022-01-01 RX ADMIN — FUROSEMIDE 40 MG: 10 INJECTION, SOLUTION INTRAMUSCULAR; INTRAVENOUS at 12:00

## 2022-01-01 RX ADMIN — SODIUM CHLORIDE 500 ML: 9 INJECTION, SOLUTION INTRAVENOUS at 19:58

## 2022-01-01 RX ADMIN — SODIUM BICARBONATE 50 MEQ: 84 INJECTION, SOLUTION INTRAVENOUS at 22:53

## 2022-01-01 RX ADMIN — SODIUM CHLORIDE 1000 ML: 9 INJECTION, SOLUTION INTRAVENOUS at 21:35

## 2022-01-01 RX ADMIN — Medication 3 MCG/MIN: at 00:08

## 2022-01-01 RX ADMIN — SODIUM CHLORIDE 25 ML: 9 INJECTION, SOLUTION INTRAVENOUS at 12:08

## 2022-01-01 RX ADMIN — SODIUM BICARBONATE: 84 INJECTION, SOLUTION INTRAVENOUS at 00:08

## 2022-01-01 RX ADMIN — SODIUM BICARBONATE: 84 INJECTION, SOLUTION INTRAVENOUS at 15:52

## 2022-01-01 RX ADMIN — HEPARIN SODIUM 5000 UNITS: 5000 INJECTION INTRAVENOUS; SUBCUTANEOUS at 13:42

## 2022-01-01 RX ADMIN — CEFEPIME 2000 MG: 2 INJECTION, POWDER, FOR SOLUTION INTRAVENOUS at 21:23

## 2022-01-01 RX ADMIN — HEPARIN SODIUM 5000 UNITS: 5000 INJECTION INTRAVENOUS; SUBCUTANEOUS at 21:40

## 2022-01-01 RX ADMIN — SODIUM CHLORIDE, PRESERVATIVE FREE 10 ML: 5 INJECTION INTRAVENOUS at 21:39

## 2022-01-01 RX ADMIN — CEFEPIME 1000 MG: 1 INJECTION, POWDER, FOR SOLUTION INTRAMUSCULAR; INTRAVENOUS at 00:39

## 2022-01-01 RX ADMIN — VANCOMYCIN HYDROCHLORIDE 1000 MG: 1 INJECTION, POWDER, LYOPHILIZED, FOR SOLUTION INTRAVENOUS at 18:24

## 2022-01-01 RX ADMIN — CEFEPIME 1000 MG: 1 INJECTION, POWDER, FOR SOLUTION INTRAMUSCULAR; INTRAVENOUS at 12:10

## 2022-01-01 RX ADMIN — PANTOPRAZOLE SODIUM 40 MG: 40 INJECTION, POWDER, FOR SOLUTION INTRAVENOUS at 09:32

## 2022-01-01 RX ADMIN — INSULIN HUMAN 10 UNITS: 100 INJECTION, SOLUTION PARENTERAL at 22:58

## 2022-01-01 RX ADMIN — HEPARIN SODIUM 5000 UNITS: 5000 INJECTION INTRAVENOUS; SUBCUTANEOUS at 05:35

## 2022-01-01 RX ADMIN — VANCOMYCIN HYDROCHLORIDE 1000 MG: 1 INJECTION, POWDER, LYOPHILIZED, FOR SOLUTION INTRAVENOUS at 23:42

## 2022-01-01 RX ADMIN — CEFEPIME 1000 MG: 1 INJECTION, POWDER, FOR SOLUTION INTRAMUSCULAR; INTRAVENOUS at 13:46

## 2022-01-01 RX ADMIN — CEFEPIME 1000 MG: 1 INJECTION, POWDER, FOR SOLUTION INTRAMUSCULAR; INTRAVENOUS at 01:51

## 2022-01-01 RX ADMIN — SODIUM BICARBONATE: 84 INJECTION, SOLUTION INTRAVENOUS at 10:01

## 2022-01-01 RX ADMIN — ALBUTEROL SULFATE 2 PUFF: 90 AEROSOL, METERED RESPIRATORY (INHALATION) at 16:20

## 2022-01-01 RX ADMIN — CALCIUM GLUCONATE 1000 MG: 98 INJECTION, SOLUTION INTRAVENOUS at 22:54

## 2022-01-01 RX ADMIN — CEFEPIME 1000 MG: 1 INJECTION, POWDER, FOR SOLUTION INTRAMUSCULAR; INTRAVENOUS at 00:14

## 2022-01-01 RX ADMIN — ALBUTEROL SULFATE 2 PUFF: 90 AEROSOL, METERED RESPIRATORY (INHALATION) at 08:06

## 2022-01-01 RX ADMIN — SODIUM BICARBONATE: 84 INJECTION, SOLUTION INTRAVENOUS at 05:40

## 2022-01-01 RX ADMIN — ALBUTEROL SULFATE 2 PUFF: 90 AEROSOL, METERED RESPIRATORY (INHALATION) at 19:21

## 2022-01-01 RX ADMIN — SODIUM CHLORIDE, PRESERVATIVE FREE 10 ML: 5 INJECTION INTRAVENOUS at 08:00

## 2022-01-01 RX ADMIN — SODIUM CHLORIDE, PRESERVATIVE FREE 10 ML: 5 INJECTION INTRAVENOUS at 07:49

## 2022-01-01 RX ADMIN — SODIUM CHLORIDE, PRESERVATIVE FREE 10 ML: 5 INJECTION INTRAVENOUS at 09:27

## 2022-01-01 RX ADMIN — HEPARIN SODIUM 5000 UNITS: 5000 INJECTION INTRAVENOUS; SUBCUTANEOUS at 06:30

## 2022-01-01 RX ADMIN — LORAZEPAM 0.5 MG: 2 INJECTION INTRAMUSCULAR at 22:25

## 2022-01-01 RX ADMIN — DEXTROSE MONOHYDRATE 25 G: 25 INJECTION, SOLUTION INTRAVENOUS at 22:58

## 2022-01-01 RX ADMIN — SODIUM CHLORIDE, PRESERVATIVE FREE 10 ML: 5 INJECTION INTRAVENOUS at 09:32

## 2022-01-01 RX ADMIN — CEFEPIME 1000 MG: 1 INJECTION, POWDER, FOR SOLUTION INTRAMUSCULAR; INTRAVENOUS at 15:30

## 2022-01-01 RX ADMIN — SODIUM CHLORIDE, PRESERVATIVE FREE 10 ML: 5 INJECTION INTRAVENOUS at 09:57

## 2022-01-01 RX ADMIN — SODIUM CHLORIDE, PRESERVATIVE FREE 10 ML: 5 INJECTION INTRAVENOUS at 13:43

## 2022-01-01 RX ADMIN — Medication 5 MCG/MIN: at 01:08

## 2022-01-01 RX ADMIN — HEPARIN SODIUM 5000 UNITS: 5000 INJECTION INTRAVENOUS; SUBCUTANEOUS at 22:10

## 2022-01-01 RX ADMIN — Medication 4 MCG/MIN: at 07:05

## 2022-01-01 RX ADMIN — VANCOMYCIN HYDROCHLORIDE 1000 MG: 1 INJECTION, POWDER, LYOPHILIZED, FOR SOLUTION INTRAVENOUS at 12:13

## 2022-01-01 RX ADMIN — FUROSEMIDE 60 MG: 10 INJECTION, SOLUTION INTRAVENOUS at 13:43

## 2022-01-01 RX ADMIN — SODIUM CHLORIDE, PRESERVATIVE FREE 10 ML: 5 INJECTION INTRAVENOUS at 15:30

## 2022-01-01 RX ADMIN — HEPARIN SODIUM 5000 UNITS: 5000 INJECTION INTRAVENOUS; SUBCUTANEOUS at 06:19

## 2022-01-01 RX ADMIN — PANTOPRAZOLE SODIUM 40 MG: 40 INJECTION, POWDER, FOR SOLUTION INTRAVENOUS at 07:49

## 2022-03-11 PROBLEM — A41.9 SEPTIC SHOCK (HCC): Status: ACTIVE | Noted: 2022-01-01

## 2022-03-11 PROBLEM — R65.21 SEPTIC SHOCK (HCC): Status: ACTIVE | Noted: 2022-01-01

## 2022-03-11 NOTE — CARE COORDINATION
Contacted patient's daughter Tonny Najera at [phone]. She is happy to discuss patient PLOF. Patient lives in her own home; daughter is there except her working hours. There is a private paid caregiver in the home - comes in when daughter leaves for work (does breakfast/lunch and tasks) . Daughter stated that patient has [facility] available for HS use. They also use [employer] instead of lifeline; has camera capacity for monitoring. Patient was active with [facility]ancy Pope. [facility] Pope liaison is meting with daughter today to get reinstatement paperwork signed. Daughter's plan is return to her home w/[facility] support. Tati Ibanez RN     PLAN FOR DISCHARGE IS HOME WITH [facility] SUPPORT. WHEN DISCHARGED- Francis Tilley  [phone].

## 2022-03-11 NOTE — ED NOTES
7721 perfect serve message sent to Dr Angela Henderson on in pt consult from Dr Mariajose Campos. Juice Noe  03/11/22 8484  [phone] Dr Angela Henderson acknowledged perfect serve message.       Juice Noe  03/11/22 7307

## 2022-03-11 NOTE — CONSULTS
Nephrology Service Consultation    Patient:  Donna Lindsay  MRN: [id number]  Consulting physician:  Tommie García, *  Reason for Consult: Acute renal failure with hyperkalemia    History Obtained From:  patient, family member -daughter, electronic medical record  PCP: Tommie García MD    HISTORY OF PRESENT ILLNESS:   The patient is a 81 y.o. female who presents with weakness fatigue found by daughter to be short of breath with dyspnea on exertion history of atrial fibrillation hypertension type 2 diabetes ejection fraction 20% with ICD and aortic stenosis. Patient currently is a DNR CCA and has hospice care at home. Does not want intubation but according them would agree to temporary dialysis. Patient was hallucinating not able to be interactive arousable admitted to ICU. Of setting patient groundglass opacities possible pulmonary edema versus pneumonia. None noted to have acute renal failure with hyperkalemia as well. In above setting patient with low urine output and no current Shukla. As family is not sure how aggressive they want to be will ask renal to evaluate to make options and plans. Past Medical History:        Diagnosis Date    Abnormal CT scan, lung 09/12/2019    Peripheral consolidation in the right lower lobe, approximately 1.8 x 1.3 cm.   There is a calcified granuloma in the right lower lobe    Aortic stenosis     Arthritis     Hands, back    Atrial fibrillation (HCC)     CAD (coronary artery disease)     Diabetes mellitus (HCC)     Type II - diet controlled as of 10/2/19    Heart murmur     Dr. Ethan Foster History of cardiac pacemaker in situ     Hyperlipidemia     Hypertension     Follows with PCP    Systolic heart failure (HCC)     LVEF 20-30%       Past Surgical History:        Procedure Laterality Date    ABDOMINAL EXPLORATION SURGERY  [id number]    distal gastrectomy, migel en y , cholecystectomy    CHOLECYSTECTOMY  2015    CORONARY ANGIOPLASTY WITH STENT PLACEMENT  11/2019    LAD per Dr Chelle Romero Bilateral 2000 & 2009    cataracts   [facility] N/A 5/2/2020    PACEMAKER INSERTION PERMANENT performed by Charbel Soto MD at [facility]       Medications:   Scheduled Meds:   sodium chloride  500 mL IntraVENous Once    amiodarone  200 mg Oral Daily    sodium chloride flush  5-40 mL IntraVENous 2 times per day    heparin (porcine)  5,000 Units SubCUTAneous 3 times per day    vancomycin (VANCOCIN) intermittent dosing (placeholder)   Other See Admin Instructions    cefepime  1,000 mg IntraVENous Q12H    albuterol sulfate HFA  2 puff Inhalation 4x daily    ipratropium  2 puff Inhalation 4x daily     Continuous Infusions:   norepinephrine-sodium chloride 4 mcg/min (03/11/22 0705)    sodium chloride      IV infusion builder      dextrose       PRN Meds:.sodium chloride flush, sodium chloride, ondansetron **OR** ondansetron, polyethylene glycol, acetaminophen **OR** acetaminophen, glucose, glucagon (rDNA), dextrose    Allergies:  Zinc and Pcn [penicillins]    Social History:   TOBACCO:   reports that she quit smoking about 7 years ago. Her smoking use included cigarettes. She started smoking about 66 years ago. She has a 59.00 pack-year smoking history. She has never used smokeless tobacco.  ETOH:   reports previous alcohol use. OCCUPATION:      Family History:       Problem Relation Age of Onset    Heart Disease Mother     High Blood Pressure Mother     Heart Disease Father     High Blood Pressure Father     Heart Attack Father        REVIEW OF SYSTEMS:  Negative except for weak confused short of breath.     Physical Exam:    I/O: 03/10 0701 - 03/11 0700  In: 1550   Out: -     Vitals: /66   Pulse 70   Temp 98.4 °F (36.9 °C) (Oral)   Resp 20   Ht 5' 7.01\" (1.702 m)   Wt 136 lb 0.4 oz (61.7 kg)   SpO2 98%   BMI 21.30 kg/m²   General appearance: awake weak  HEENT: Head: Normal, normocephalic, atraumatic. Neck: supple, symmetrical, trachea midline  Lungs: diminished breath sounds bilaterally coarse breath sounds  Heart: S1, S2 normal  Abdomen: abnormal findings:  soft NT  Extremities: edema trace  Neurologic: Mental status: alertness: Awake anxious      CBC:   Recent Labs     03/10/22  1950   WBC 16.6*   HGB 12.3*        BMP:    Recent Labs     03/10/22  1947   *   K 6.1*   CL 82*   CO2 13*   BUN 63*   CREATININE 2.9*   GLUCOSE 104*     Hepatic:   Recent Labs     03/10/22  1947   *   *   BILITOT 1.5*   ALKPHOS 189*     Troponin: No results for input(s): TROPONINI in the last 72 hours. BNP: No results for input(s): BNP in the last 72 hours. Lipids: No results for input(s): CHOL, HDL in the last 72 hours.     Invalid input(s): LDLCALCU  ABGs:   Lab Results   Component Value Date    PO2ART 80 03/11/2022    LPI8BNL 33.0 03/11/2022     INR:   Recent Labs     03/11/22  1145   INR 1.83     Renal Labs  Albumin:    Lab Results   Component Value Date    LABALBU 3.5 03/10/2022     Calcium:    Lab Results   Component Value Date    CALCIUM 9.3 03/10/2022     Phosphorus:    Lab Results   Component Value Date    PHOS 3.4 11/18/2019     U/A:    Lab Results   Component Value Date    NITRU NEGATIVE 04/30/2020    COLORU STRAW 04/30/2020    WBCUA <1 04/30/2020    RBCUA 2 04/30/2020    MUCUS OCCASIONAL 11/08/2019    TRICHOMONAS NONE SEEN 04/30/2020    YEAST FEW 09/11/2017    BACTERIA RARE 04/30/2020    CLARITYU CLEAR 04/30/2020    SPECGRAV 1.005 04/30/2020    UROBILINOGEN NORMAL 04/30/2020    BILIRUBINUR NEGATIVE 04/30/2020    BLOODU SMALL 04/30/2020    KETUA NEGATIVE 04/30/2020     ABG:    Lab Results   Component Value Date    OVV1ATR 33.0 03/11/2022    PO2ART 80 03/11/2022    KZT2RUW 18.2 03/11/2022     HgBA1c:    Lab Results   Component Value Date    LABA1C 6.9 09/12/2019     Microalbumen/Creatinine ratio:  No components found for: RUCREAT  TSH: No results found for: TSH  IRON:    Lab Results   Component Value Date    IRON 40 04/30/2020     Iron Saturation:  No components found for: PERCENTFE  TIBC:    Lab Results   Component Value Date    TIBC 261 04/30/2020     FERRITIN:  No results found for: FERRITIN  RPR:  No results found for: RPR  MARY:  No results found for: ANATITER, MARY  24 Hour Urine for Creatinine Clearance:  No components found for: CREAT4, UHRS10, UTV10  -----------------------------------------------------------------      Assessment and Recommendations     Patient Active Problem List   Diagnosis Code    Atrial flutter (HCC) I48.92    Moderate malnutrition (HCC) E44.0    Pneumonia of left lower lobe due to infectious organism J18.9    Aortic valve stenosis I35.0    Type 2 diabetes mellitus without complication, without long-term current use of insulin (HCC) E11.9    ST elevation MI (STEMI) (HCC) I21.3    STEMI (ST elevation myocardial infarction) (HCC) I21.3    Acute MI ([facility]) S71.1    Acute systolic (congestive) heart failure (HCC) I50.21    Acute metabolic encephalopathy B48.10    Shock liver K72.00    Aspiration pneumonia (HCC) J69.0    Acute on chronic systolic heart failure (HCC) I50.23    Presence of temporary transvenous cardiac pacemaker Z95.0    Acute heart failure (HCC) M26.1    Systolic heart failure (HCC) I50.20    History of cardiac pacemaker in situ Z95.0    CAD (coronary artery disease) I25.10    Atrial fibrillation (HCC) I48.91    Aortic stenosis I35.0    Septic shock (HCC) A41.9, R65.21     Impression plan  #1 acute renal failure from ATN  #2 hyperkalemia  #3 hyponatremia  #4 metabolic acidosis  #5 low urine output with possible urine retention  #6 type 2 diabetes  #7 possible septic shock  #8 congestive heart failure with ICD and aortic stenosis  #9 CODE STATUS/history of hospice    Plan  #1 Place Shukla catheter give gentle IV fluids and hydration  #2 recheck potassium this evening to be aggressive with treatment  #3 monitor sodium is hydrate  #4 correct acidosis with bicarb  #5 Place Shukla catheter monitor for now  #6 monitor glucose  #7 panculture start antibiotics follow  #8 hold diuretics monitor volume status wearing BiPAP monitor O2 and not want intubation  #9 currently DNR CCA and would look at all options short of intubation and will need to discuss with family based on follow-up labs and outpatient response the next 24 hours about how aggressive to be in the above setting.   We will monitor and follow-up plan for for possible supportive/comfort care if not improved  Electronically signed by Hazel Jones MD on 3/11/2022 at 2:34 PM

## 2022-03-11 NOTE — ED NOTES
Report given to [facility] who denies questions.       Ulysses Saunas, RN  03/11/22 2139

## 2022-03-11 NOTE — PROGRESS NOTES
PROCEDURE PERFORMED: Right thoracentesis by Dr Jun Artis:  Obtained prior to procedure. Consent placed in chart. SREEKANTH SCORE PRE PROCEDURE:  9      PT TRANSPORTED FROM:                                    TO THE IR ROOM:     Large     PT IN THE ROOM AT WHAT TIME:        BARRIER PRECAUTIONS & STERILE TECHNIQUE:               Pt placed on VS Monitor. Pt on left side, prepped and draped in a sterile fashion with chlorhexadine.     TIME OUT: 1423    PAIN/LOCAL ANESTHESIA/SEDATION MANAGEMENT:           Local: Lidocaine 1% given by Dr          Sedation: None             Fentanyl:             Versed:     INTRAOPERATIVE:           ACCESS TIME:           US/FLUORO: U/S guided          WIRE USED:           SHEATH USED:           CATHETER USED:           FINAL IMAGE TAKEN TO CONFIRM PLACEMENT OF:           CONTRAST/CC:     STERILE DRESSINGS: Gauze with tegaderm applied to right posterior chest site    SPECIMENS: Right pleural:  275cc clear yellow fluid removed; specimen sent to lab    EBL:<1cc    FOLLOW- UP X-RAY: ordered & notified    COMPLICATIONS/ OUTCOME: None; tolerated procedure well    STAFF PRESENT DURING PROCEDURE: Lexus Cedillo RN, Meche Najera RN(scrub), Samantha Ansari RN(ACE), Yg Davies RN(pt nurse)    Yvrose De Los Santos SCORE POST PROCEDURE:   9       REPORT CALLED TO: Yg Davies RN present    PT LEFT ROOM AT WHAT TIME:    [id number] waiting on transport and radiology for port chest xray

## 2022-03-11 NOTE — PROGRESS NOTES
#16 [place] hamilton catheter inserted per Danny Emerson RN/LYN Pichardo per protocol/sterile technique.  Pt tolerated well

## 2022-03-11 NOTE — ED NOTES
7714 perfect serve message sent to Dr Ayad Ontiveros on in pt consult from Dr Irma Wilson. Wilma Caruso  03/11/22 8152  0883 Dr Ayad Ontiveros acknowledged perfect serve message.  Added to treatment team.      Wilma Caruso  03/11/22 0991

## 2022-03-11 NOTE — PLAN OF CARE
Spoke w/ ROBYN Chavis, to clarify US orders placed for PT. In PT's room placing a central line. Will call when finished.

## 2022-03-11 NOTE — PROGRESS NOTES
Pt seen and examined full note to follow  arf with high K  Low uop   dnr cc-a and hospice at home  Would agree dialysis if need  On pressor  dw pt and daughter in room.  Recheck labs and place hamilton  Will follow

## 2022-03-11 NOTE — PROGRESS NOTES
Informed Dr. Melissa Rizvi of pt's BS- 62 and that she was unable to take adequate PO d/t being BI-PAP to help correct her BS- see new order for 50% IVP solution x1

## 2022-03-11 NOTE — ED NOTES
Patient transported by this RN and RT to room 2007.   Patient left in care of SAÚL Smith RN  03/11/22 0036

## 2022-03-11 NOTE — ED PROVIDER NOTES
EKG independently interpreted by me. I otherwise had no part in patient care. EKG: AV dual paced rhythm with a ventricular rate of 70, NV interval 186, , no ST elevation concordance for elevation or depression, no excessive discordance noted.      Solomon Moore MD  03/10/22 7791

## 2022-03-11 NOTE — ED PROVIDER NOTES
Triage Chief Complaint:   Hypotension    [place]:  Today in the ED I had the pleasure of caring for Delphine Smith who is a 81 y.o. female that presents to the emergency department. For hypotension, fatigue, weakness. Brought in by squad. EMS was called by family. Patient does have a history of coronary artery disease, severe CHF, pacemaker in situ, diabetes, A. fib. Aortic valve stenosis, and hepatic shock. She endorses fatigue and sob. No cp. No back pain. No urinary sx. She does endorse confusion. Comes to bedside. Daughter reports that patient has had decreased urinary output over the last 3 days. And today began hallucinating. Seeing ants at the foot of bed when and when none. Son and daughter decided that they ring patient in for evaluation. Daughter endorses that patient is on hospice. She is a DNR CCA/DO NOT INTUBATE.      ROS:  REVIEW OF SYSTEMS    At least 10 systems reviewed      All other review of systems are negative  See HPI and nursing notes for additional information       Past Medical History:   Diagnosis Date    Abnormal CT scan, lung 09/12/2019    Peripheral consolidation in the right lower lobe, approximately 1.8 x 1.3 cm.   There is a calcified granuloma in the right lower lobe    Aortic stenosis     Arthritis     Hands, back    Atrial fibrillation (HCC)     CAD (coronary artery disease)     Diabetes mellitus (HCC)     Type II - diet controlled as of 10/2/19    Heart murmur     Dr. Therese Tavarez History of cardiac pacemaker in situ     Hyperlipidemia     Hypertension     Follows with PCP    Systolic heart failure (HCC)     LVEF 20-30%     Past Surgical History:   Procedure Laterality Date    ABDOMINAL EXPLORATION SURGERY  [id number]    distal gastrectomy, migel en y , cholecystectomy    CHOLECYSTECTOMY  2015    CORONARY ANGIOPLASTY WITH STENT PLACEMENT  11/2019    LAD per Dr Carmen Nelson Bilateral 2000 & 2009    cataracts   [address] INSERTION N/A 2020    PACEMAKER INSERTION PERMANENT performed by Nirav Perez MD at [facility]     Family History   Problem Relation Age of Onset    Heart Disease Mother     High Blood Pressure Mother     Heart Disease Father     High Blood Pressure Father     Heart Attack Father      Social History     Socioeconomic History    Marital status:      Spouse name: Not on file    Number of children: Not on file    Years of education: Not on file    Highest education level: Not on file   Occupational History    Not on file   Tobacco Use    Smoking status: Former Smoker     Packs/day: 1.00     Years: 59.00     Pack years: 59.00     Types: Cigarettes     Start date:      Quit date:      Years since quittin.1    Smokeless tobacco: Never Used   Vaping Use    Vaping Use: Never used   Substance and Sexual Activity    Alcohol use: Not Currently     Alcohol/week: 0.0 standard drinks     Comment: No alcohol since , rarely before that    Drug use: No    Sexual activity: Not on file   Other Topics Concern    Not on file   Social History Narrative    Not on file     Social Determinants of Health     Financial Resource Strain:     Difficulty of Paying Living Expenses: Not on file   Food Insecurity:     Worried About [address] in the Last Year: Not on file    [address] in the Last Year: Not on file   Transportation Needs:     Lack of Transportation (Medical): Not on file    Lack of Transportation (Non-Medical):  Not on file   Physical Activity:     Days of Exercise per Week: Not on file    Minutes of Exercise per Session: Not on file   Stress:     Feeling of Stress : Not on file   Social Connections:     Frequency of Communication with Friends and Family: Not on file    Frequency of Social Gatherings with Friends and Family: Not on file    Attends Buddhism Services: Not on file   [employer] or Organizations: Not on file    Attends Club or Organization Meetings: Not on file    Marital Status: Not on file   Intimate Partner Violence:     Fear of Current or Ex-Partner: Not on file    Emotionally Abused: Not on file    Physically Abused: Not on file    Sexually Abused: Not on file   Housing Stability:     Unable to Pay for Housing in the Last Year: Not on file    Number of Veronica in the Last Year: Not on file    Unstable Housing in the Last Year: Not on file     Current Facility-Administered Medications   Medication Dose Route Frequency Provider Last Rate Last Admin    norepinephrine (LEVOPHED) 16 mg in sodium chloride 0.9 % 250 mL infusion (non-weight-based)  1-100 mcg/min IntraVENous Continuous [employer], PA-C 1.9 mL/hr at 03/11/22 0157 2 mcg/min at 03/11/22 0157    0.9 % sodium chloride bolus  500 mL IntraVENous Once [employer], PA-C   Held at 03/11/22 0134    furosemide (LASIX) injection 10 mg  10 mg IntraVENous Once [employer], PA-C        glucose (GLUTOSE) 40 % oral gel 15 g  15 g Oral PRN [employer], PA-C        glucagon (rDNA) injection 1 mg  1 mg IntraMUSCular PRN [employer], PA-C        dextrose 5 % solution  100 mL/hr IntraVENous PRN [employer], PA-C         Current Outpatient Medications   Medication Sig Dispense Refill    furosemide (LASIX) 40 MG tablet Take 1 tablet by mouth 3 times daily Hold if sbp is less than 100 90 tablet 2    ivabradine (CORLANOR) 5 MG TABS tablet Take 1 tablet by mouth 2 times daily (with meals) 60 tablet 2    metoprolol succinate (TOPROL XL) 25 MG extended release tablet Take 1 tablet by mouth 2 times daily 60 tablet 2    aspirin 81 MG chewable tablet Take 1 tablet by mouth daily 30 tablet 3    amiodarone (CORDARONE) 200 MG tablet Take 1 tablet by mouth daily 30 tablet 3    apixaban (ELIQUIS) 2.5 MG TABS tablet Take 1 tablet by mouth 2 times daily 60 tablet 0    potassium chloride (KLOR-CON M) 10 MEQ extended release tablet Take 1 tablet by mouth 2 times daily 60 tablet 3    ticagrelor (BRILINTA) 90 MG TABS tablet Take 1 tablet by mouth 2 times daily 60 tablet 1    midodrine (PROAMATINE) 10 MG tablet Take 1 tablet by mouth 3 times daily (with meals) 90 tablet 3    magnesium gluconate (MAGONATE) 500 MG tablet Take 250 mg by mouth 2 times daily      travoprost, benzalkonium, (TRAVATAN) 0.004 % ophthalmic solution Place 1 drop into both eyes nightly       pantoprazole (PROTONIX) 40 MG tablet Take 1 tablet by mouth daily 30 tablet 3    atorvastatin (LIPITOR) 10 MG tablet Take 10 mg by mouth daily       Allergies   Allergen Reactions    Zinc     Pcn [Penicillins] Rash       Nursing Notes Reviewed    Physical Exam:  ED Triage Vitals   Enc Vitals Group      BP 03/10/22 1900 86/61      Pulse 03/10/22 1900 75      Resp 03/10/22 1900 20      Temp 03/10/22 1900 97.9 °F (36.6 °C)      Temp Source 03/10/22 1900 Oral      SpO2 03/10/22 1900 93 %      Weight 03/10/22 2100 136 lb 0.4 oz (61.7 kg)      Height 03/10/22 2100 5' 7.01\" (1.702 m)      Head Circumference --       Peak Flow --       Pain Score --       Pain Loc --       Pain Edu? --       Excl. in [address]? --      General :Patient is awake alert oriented person place only. Toxic and fatigued very thin appearing elderly female. HEENT: Pupils are equally round and reactive to light extraocular motors are intact conjunctivae clear sclerae white there is no injection no icterus. Nose without any rhinorrhea or epistaxis. Oral mucosa is moist no exudate buccal mucosa shows no ulcerations. Uvula is midline    Neck: Neck is supple full range of motion trachea midline thyroid nonpalpable  Cardiac: Heart regular rate rhythm no murmurs rubs clicks or gallops  Lungs: coarse lung sounds noted. Tachypnea noted. Chest wall: There is no tenderness to palpation over the chest wall or over ribs  Abdomen: Abdomen is soft nontender nondistended.  There is no firm or pulsatile masses no rebound rigidity or guarding negative Barrientos's negative McBurney, no peritoneal signs  Suprapubic:  there is no tenderness to palpation over the external bladder   Musculoskeletal: 3 out of 5 strength in all 4 extremities full flexion extension abduction and adduction supination pronation of all extremities and all digits. No obvious muscle atrophy is noted. No focal muscle deficits are appreciated  Dermatology: Skin is warm and dry there is no obvious abscesses lacerations or lesions noted  Psych: Mentation is grossly normal cognition is grossly normal. Affect is appropriate  Neuro: Motor intact sensory intact cranial nerves II through XII are intact level of consciousness is normal cerebellar function is normal reflexes are grossly normal. No evidence of incontinence or loss of bowel or bladder no saddle anesthesia noted Lymphatic: There is no submandibular or cervical adenopathy appreciated. Procedure Note - Central Line:  The benefits, risks, and alternatives of central venous access were discussed with the  patient, daughter and guardian and Written consent was obtained for the procedure. Komalrebecca Rell Baldemar was prepped and draped in standard bedside fashion in the R Femoral area. Physical landmarks with ultrasound guidance was used to locate the central vein. Local anesthesia with 3ml of 1% lidocaine was injected. Seldinger technique was used for placement of the CVC in a non-pulsatile vasculature. All ports andi and flushed. The patient tolerated the procedure well and no acute complications occurred.         I have reviewed and interpreted all of the currently available lab results from this visit (if applicable):  Results for orders placed or performed during the hospital encounter of 03/10/22   COVID-19, Rapid    Specimen: Nasopharyngeal   Result Value Ref Range    Source THROAT     SARS-CoV-2, NAAT NOT DETECTED NOT DETECTED   Comprehensive Metabolic Panel w/ Reflex to MG Result Value Ref Range    Sodium 121 (L) 135 - 145 MMOL/L    Potassium 6.1 (HH) 3.5 - 5.1 MMOL/L    Chloride 82 (L) 99 - 110 mMol/L    CO2 13 (L) 21 - 32 MMOL/L    BUN 63 (H) 6 - 23 MG/DL    CREATININE 2.9 (H) 0.6 - 1.1 MG/DL    Glucose 104 (H) 70 - 99 MG/DL    Calcium 9.3 8.3 - 10.6 MG/DL    Albumin 3.5 3.4 - 5.0 GM/DL    Total Protein 8.1 6.4 - 8.2 GM/DL    Total Bilirubin 1.5 (H) 0.0 - 1.0 MG/DL     (H) 10 - 40 U/L     (H) 15 - 37 IU/L    Alkaline Phosphatase 189 (H) 40 - 128 IU/L    GFR Non-African American 15 (L) >60 mL/min/1.73m2    GFR  19 (L) >60 mL/min/1.73m2    Anion Gap 26 (H) 4 - 16   Troponin   Result Value Ref Range    Troponin T 0.032 (H) <0.01 NG/ML   Brain Natriuretic Peptide   Result Value Ref Range    Pro-BNP 42,281 (H) <300 PG/ML   Lactic Acid   Result Value Ref Range    Lactate 8.4 (HH) 0.4 - 2.0 mMOL/L   Blood Gas, Venous   Result Value Ref Range    pH, Navi 7.13 (L) 7.32 - 7.42    pCO2, Navi 51 38 - 52 mmHG    pO2, Navi 40 28 - 48 mmHG    Base Excess 12 (H) 0 - 2.4    HCO3, Venous 17.0 (L) 19 - 25 MMOL/L    O2 Sat, Navi 49.8 (L) 50 - 70 %   CBC with Auto Differential   Result Value Ref Range    WBC 16.6 (H) 4.0 - 10.5 K/CU MM    RBC 4.82 4.2 - 5.4 M/CU MM    Hemoglobin 12.3 (L) 12.5 - 16.0 GM/DL    Hematocrit 41.3 37 - 47 %    MCV 85.7 78 - 100 FL    MCH 25.5 (L) 27 - 31 PG    MCHC 29.8 (L) 32.0 - 36.0 %    RDW 19.6 (H) 11.7 - 14.9 %    Platelets 187 124 - 476 K/CU MM    MPV 11.8 (H) 7.5 - 11.1 FL    Differential Type AUTOMATED DIFFERENTIAL     Segs Relative 92.8 (H) 36 - 66 %    Lymphocytes % 2.3 (L) 24 - 44 %    Monocytes % 4.2 (H) 0 - 4 %    Eosinophils % 0.0 0 - 3 %    Basophils % 0.1 0 - 1 %    Segs Absolute 15.4 K/CU MM    Lymphocytes Absolute 0.4 K/CU MM    Monocytes Absolute 0.7 K/CU MM    Eosinophils Absolute 0.0 K/CU MM    Basophils Absolute 0.0 K/CU MM    Nucleated RBC % 0.0 %    Total Nucleated RBC 0.0 K/CU MM    Total Immature Neutrophil 0.10 K/CU MM Immature Neutrophil % 0.6 (H) 0 - 0.43 %   Hepatitis Panel, Acute   Result Value Ref Range    Hepatitis B Surface Ag NON REACTIVE NON REACTIVE    Hep A IgM NON REACTIVE NON REACTIVE    Hep B Core Ab, IgM NON REACTIVE NON REACTIVE    Hepatitis C Ab NON REACTIVE NON REACTIVE   Blood Gas, Venous   Result Value Ref Range    pH, Navi 7.18 (L) 7.32 - 7.42    pCO2, Navi 50 38 - 52 mmHG    pO2, Navi 54 (H) 28 - 48 mmHG    Base Excess 10 (H) 0 - 2.4    HCO3, Venous 18.7 (L) 19 - 25 MMOL/L    O2 Sat, Navi 75.7 (H) 50 - 70 %   POCT Glucose   Result Value Ref Range    POC Glucose 96 70 - 99 MG/DL   POCT Glucose   Result Value Ref Range    POC Glucose 176 (H) 70 - 99 MG/DL   POCT Glucose   Result Value Ref Range    POC Glucose 162 (H) 70 - 99 MG/DL   EKG 12 Lead   Result Value Ref Range    Ventricular Rate 70 BPM    Atrial Rate 51 BPM    P-R Interval 186 ms    QRS Duration 208 ms    Q-T Interval 572 ms    QTc Calculation (Bazett) 617 ms    P Axis 56 degrees    R Axis -87 degrees    T Axis 99 degrees    Diagnosis       AV dual-paced rhythm  Abnormal ECG  When compared with ECG of 11-MAY-2020 04:08,  Electronic ventricular pacemaker has replaced Wide QRS rhythm        Radiographs (if obtained):  [] The following radiograph was interpreted by myself in the absence of a radiologist:   [] Radiologist's Report Reviewed:  VL DUP LOWER EXTREMITY ARTERIES BILATERAL   Final Result   Moderate to severe peripheral arterial disease is identified, with multifocal   atherosclerotic plaque and heavy trifurcation disease based on waveform   analysis. No severe discrete focal stenosis based on velocity criteria. Occlusion of the distal peroneal artery seen bilaterally. US ABDOMEN LIMITED   Final Result   Status post cholecystectomy. No significant findings. CT CHEST WO CONTRAST   Final Result   1.  Significant patchy ground-glass and consolidative opacities in the   bilateral lower and right middle lobes suspicious for multifocal pneumonia or   aspiration. Pulmonary edema is also a consideration. 2. Moderate right pleural effusion with smooth pleural thickening suggesting   an exudative effusion. Small left pleural effusion. 3. Cardiomegaly. 4. Mildly enlarged main pulmonary artery as can be seen with pulmonary   hypertension. 5. Mildly enlarged nonspecific distal right paratracheal lymph node likely   reactive. CT Head WO Contrast   Final Result   No acute intracranial abnormality. EKG (if obtained):   Please See Note of attending physician for EKG interpretation. Chart review shows recent radiograph(s):  No results found. MDM:     Interventions given this visit:   Orders Placed This Encounter   Medications    furosemide (LASIX) injection 10 mg    0.9 % sodium chloride bolus    cefepime (MAXIPIME) 2000 mg IVPB minibag     Order Specific Question:   Antimicrobial Indications     Answer:   Sepsis of Unknown Etiology    0.9 % sodium chloride bolus    AND Linked Order Group     insulin regular (HUMULIN R;NOVOLIN R) injection 10 Units     dextrose 50 % IV solution    glucose (GLUTOSE) 40 % oral gel 15 g    DISCONTD: dextrose 50 % IV solution    glucagon (rDNA) injection 1 mg    dextrose 5 % solution    sodium bicarbonate 8.4 % injection 50 mEq    calcium gluconate 10 % injection 1,000 mg    vancomycin 1000 mg IVPB in 250 mL NS addavial     Order Specific Question:   Antimicrobial Indications     Answer:   Sepsis of Unknown Etiology    DISCONTD: midodrine (PROAMATINE) tablet 10 mg    norepinephrine (LEVOPHED) 16 mg in sodium chloride 0.9 % 250 mL infusion (non-weight-based)     Order Specific Question:   Titrate Infusion? Answer:   Yes     Order Specific Question:   Initial Infusion Dose: Answer:   5 mcg/min     Order Specific Question:   Goal of Therapy is:      Answer:   MAP greater than 65 mmHg     Order Specific Question:   Contact Provider if:     Answer:   Patient is receiving the maximum dose and is not achieving the goal of therapy    0.9 % sodium chloride bolus     Ill-appearing elderly female presents today to the emergency department. Slightly hypotensive upon arrival.  She is provided with multiple liters of IV fluids which she does respond with initially. However throughout her stay. Her blood pressure does decrease. With a mean arterial pressure reaching 47. Given patient only has an ejection fraction of 10%. And her BNP is markedly elevated. I do not believe that fluid resuscitation was in patient's best interest at the risk of overloading causing further respiratory distress. For this reason patient was initiated on Levophed. Central line was placed by myself. Titration was initiated. And patient's blood pressure does respond appropriately    Patient is noted be significantly acidotic. With a venous blood gas of 7.13. I do believe he is likely secondary to lactic acidosis. Patient's lactic acid is 8.2. Broad-spectrum antibiotics are initiated. Patient is afebrile. Physical exam did reveal cool feet with minimal pulses. Patient has significant kidney injury, CT angiogram of her lower extremities. With further damage the kidneys. So arterial ultrasound was ordered. Bilateral lower extremities. Which does reveal significant peripheral arterial disease. With complete occlusion of bilateral peroneal's. However there is lateral circulation noted. Patient's troponin is slightly elevated. Due to this is likely renal in etiology have low suspicion of acute coronary syndrome as EKG shows no acute abnormalities. Patient CT scan of the chest does reveal pulmonary vascular congestion, pleural effusions, what appears to be exudative pleural effusions. Metabolic panel also reveals hyponatremia. 0157.  After patient was started on Levo, I went to reevaluate patient and noticed patient had responded to the medication and was now significantly hypertensive. I verbally ordered RN to titrated down by 3mc/min vs. The orderset which stipulates 2. Patient does have increased work of breathing. During her stay here in the emergency department and she also is significantly acidotic on blood gas. She is put on BiPAP. And does respond well. Throughout her stay here in the emergency department she does become much more lucid. And appears to be significantly less confused. Patient herself states that she does not wish to be intubated. Daughter/POA also states that patient is not to be intubated. BiPAP remains most aggressive/acceptable form of breathing aid for patient at this time. Patient's critical status. Patient's physician Dr. Kelley Liang was consulted. We discussed at length patient's history, physical examination laboratory results, radiographic results. The patient will be admitted to the intensive care unit. For further inpatient care. I had a long discussion with daughter at bedside regarding how critically ill patient is. And end-of-life care. Daughter is fully aware of patient's current status. Daughter remained in the room throughout the entirety of her emergency department stay and all questions were answered. Patient will be admitted. For all information regarding ongoing care of patient please see note of admitting physician. Total critical care time today provided was at least 60 minutes. This excludes seperately billable procedure. Critical care time provided for Sepsis  that required close evaluation and/or intervention with concern for patient decompensation. I independently managed patient today in the ED    /66   Pulse 84   Temp 97.9 °F (36.6 °C) (Oral)   Resp 16   Ht 5' 7.01\" (1.702 m)   Wt 136 lb 0.4 oz (61.7 kg)   SpO2 92%   BMI 21.30 kg/m²       Clinical Impression:  1. Septicemia ([address]    2. Acute respiratory failure with hypoxia (HCC)    3. Hyperkalemia    4. Metabolic acidosis    5.  Metabolic encephalopathy    6. Hyponatremia    7. HORTENCIA (acute kidney injury) ([facility]    8. Transaminitis    9. Elevated troponin    10. Pleural effusion    11. Exudative pleural effusion    12. PAD (peripheral artery disease) ([facility]    13. Multifocal pneumonia        Disposition referral (if applicable):  No follow-up provider specified. Disposition medications (if applicable):  New Prescriptions    No medications on file         Comment: Please note this report has been produced using speech recognition software and may contain errors related to that system including errors in grammar, punctuation, and spelling, as well as words and phrases that may be inappropriate. If there are any questions or concerns please feel free to contact the dictating provider for clarification.       Lopez Shepard, [address] [place] [address]  03/11/22 1649

## 2022-03-11 NOTE — PROGRESS NOTES
03/11/22 1630   NIV Type   NIV Started/Stopped On   Equipment Type v60   Mode Bilevel   Mask Type Full face mask   Mask Size Small   Settings/Measurements   IPAP 15 cmH20   CPAP/EPAP 6 cmH2O   Rate Ordered 12   Resp 27   Insp Rise Time (%) 2 %   FiO2  60 %   I Time/ I Time % 1.1 s   Vt Exhaled 579 mL   Minute Volume 11.7 Liters   Mask Leak (lpm) 0 lpm   Comfort Level Fair   Using Accessory Muscles No   SpO2 96   Breath Sounds   Right Upper Lobe Diminished   Right Middle Lobe Diminished   Right Lower Lobe Diminished   Left Upper Lobe Diminished   Left Lower Lobe Diminished   Alarm Settings   Alarms On Y   Press Low Alarm 5 cmH2O   High Pressure Alarm 25 cmH2O   Delay Alarm 20 sec(s)   Apnea (secs) 20 secs   Resp Rate Low Alarm 13   High Respiratory Rate 40 br/min

## 2022-03-11 NOTE — PROGRESS NOTES
[facility]  consulted by Dr. Misty Chahal for monitoring and adjustment. Indication for treatment: Sepsis  Goal trough: [] 10-15 mcg/mL or [x] 15-20 mcg/ml  AUC/FRANDY: [] <500 or [x] 400-600    Pertinent Laboratory Values:   Temp Readings from Last 3 Encounters:   03/10/22 97.9 °F (36.6 °C) (Oral)   05/12/20 97.3 °F (36.3 °C) (Oral)   05/06/20 97.6 °F (36.4 °C)     Recent Labs     03/10/22  1950   WBC 16.6*   LACTATE 8.4*     Recent Labs     03/10/22  1947   BUN 63*   CREATININE 2.9*     Estimated Creatinine Clearance: 14 mL/min (A) (based on SCr of 2.9 mg/dL (H)). Intake/Output Summary (Last 24 hours) at 3/11/2022 0908  Last data filed at 3/10/2022 2309  Gross per 24 hour   Intake 1550 ml   Output --   Net 1550 ml       Pertinent Cultures:  Date    Source    Results  3/11   Urine    Ordered    Vancomycin level:   TROUGH:  No results for input(s): VANCOTROUGH in the last 72 hours. RANDOM:  No results for input(s): VANCORANDOM in the last 72 hours.     Assessment:  · SCr, BUN, and urine output:  · HORTENCIA  · Baseline Scr 1.4  · Day(s) of therapy: 1  · Vancomycin concentration: to be collected    Plan:  · Vancomycin 1000 mg x1 dose in ER  · Continue intermittent vancomycin dosing based on levels  · Plan to collect a level tomorrow AM  · Pharmacy will continue to monitor patient and adjust therapy as indicated    VANCOMYCIN CONCENTRATION SCHEDULED FOR 3/12 @ 0600    Thank you for the consult,  Nettie Elizabeth, [address] [place] [address], PharmD  3/11/2022 9:08 AM

## 2022-03-12 NOTE — CONSULTS
[address], [address] [place] [address]                                  CONSULTATION    PATIENT NAME: Ana Mary                   :        1935  MED REC NO:   [id number]                          ROOM:       2007  ACCOUNT NO:   [de-identified]                           ADMIT DATE: 03/10/2022  PROVIDER:     Andria Dozier MD    CONSULT DATE:  2022    HISTORY OF PRESENT ILLNESS:  The patient is an 81-year-old lady with  multiple medical problems including coronary artery disease, diabetes,  atrial fibrillation, hyperlipidemia, and hypertension, who was brought  to the emergency room because of generalized fatigue and possible UTI. According to the daughter, the patient was having decreased urine output  for the past few days. The patient then started having hallucinations  and was complaining of urinary burning. The patient was brought to the  emergency room. In the emergency room, the patient was hypotensive and  required pressor support. Her venous blood gases showed a pH of 7.18,  pCO2 of 50, pO2 of 54 with 75% saturation. The patient was placed on  the BiPAP and was subsequently admitted to the intensive care unit. There was no history of hemoptysis, hematemesis, nausea, or vomiting. No history of chest pains. PAST MEDICAL HISTORY:  Significant for hyperlipidemia, hypertension,  coronary artery disease, diabetes, and arthritis. PAST SURGICAL HISTORY:  Remarkable for abdominal exploration surgery,  cholecystectomy, coronary angioplasty with stent placement,  hysterectomy, pacemaker insertion, tonsillectomy and adenoidectomy. FAMILY HISTORY:  Reveals that her mother had heart disease,  hypertension. Father had heart disease, hypertension and coronary  artery disease. SOCIAL HISTORY:  Reveals that she quit smoking in  but used to smoke  a pack per day for 59 years prior to that.   No history of alcohol or  drug abuse. MEDICATIONS:  Reviewed. ALLERGIES:  She is allergic to ZINC and PENICILLIN. REVIEW OF SYSTEMS:  Unobtainable at this time. PHYSICAL EXAMINATION:  GENERAL:  The patient is on the BiPAP, pleasantly confused, in no acute  respiratory distress. VITAL SIGNS:  Her blood pressure is 116/68 mmHg, pulse of 74 per minute  and respiratory rate of 17 per minute. She is afebrile. Her saturation  is 96% on BiPAP 15/5. HEENT:  Exam is essentially unremarkable. There is no JVD, no  lymphadenopathy. NECK:  Supple. LUNGS:  Exam revealed decreased breath sounds at right base, patchy  ground-glass opacities in bilateral lower lobes and middle lobe and  moderate right pleural effusion and evidence of pulmonary hypertension. LABORATORY AND DIAGNOSTIC DATA:  Her electrolytes showed a sodium of  121, potassium 6.1, chloride 82, carbon dioxide 13, BUN 63, creatinine  2.9. Her lactate was 8.4. Her proBNP was 42,281. Her CBC showed a  white count of 16.6, hemoglobin 12.3, hematocrit 41.3. Her venous blood  gases on admission showed a pH of 7.13, pCO2 of 51, pO2 of 40 with 49%  saturation. Her ABGs on admission to ICU showed a pH of 7.35, pCO2 of  33, pO2 of 80 with 93% saturation. Her rapid COVID-19 was negative. IMPRESSION:  1. Acute respiratory failure secondary to sepsis syndrome and possible  UTI. 2.  Sepsis syndrome. 3.  Possible UTI. 4.  Bilateral pneumonia. 5.  Right-sided pleural effusion. PLAN:  1. Continue broad-spectrum antibiotic coverage. 2.  Bronchodilator therapy. 3.  Right-sided thoracentesis. 4.  Sputum for culture and sensitivity. 5.  Electrolyte management as per Nephrology. 6.  Wean FiO2 to keep saturation greater than 90%. 7.  Discussed with the family in detail. 8.  As per orders.         Chela Dowd MD    D: 03/11/2022 11:02:17       T: 03/11/2022 11:05:07     /[email or web address]  Job#: [id number]     Doc#: [id number]    CC:

## 2022-03-12 NOTE — PROGRESS NOTES
INTERNAL MEDICINE PROGRESS NOTE        Zeenat Todd   1935   Primary Care Physician:  Enoch Santamaria MD  Admit Date: 3/10/2022     Subjective:   Pt status is guarded today. Not answering questions appropriately or following basic commands. Believes she is at home. Other speech garbled and incomprehensible. Meds, labs and other notes reviewed. Ct chest 3/10:  Impression   1. Significant patchy ground-glass and consolidative opacities in the   bilateral lower and right middle lobes suspicious for multifocal pneumonia or   aspiration.  Pulmonary edema is also a consideration. 2. Moderate right pleural effusion with smooth pleural thickening suggesting   an exudative effusion.  Small left pleural effusion. 3. Cardiomegaly. 4. Mildly enlarged main pulmonary artery as can be seen with pulmonary   hypertension. 5. Mildly enlarged nonspecific distal right paratracheal lymph node likely   reactive. US RUQ 3/10:  Impression   Status post cholecystectomy.  No significant findings. Arterial Lower Ext US 3/11:  Impression   Moderate to severe peripheral arterial disease is identified, with multifocal   atherosclerotic plaque and heavy trifurcation disease based on waveform   analysis.  No severe discrete focal stenosis based on velocity criteria.       Occlusion of the distal peroneal artery seen bilaterally. Objective:   /79   Pulse 70   Temp 97.7 °F (36.5 °C) (Axillary)   Resp 23   Ht 5' 7.01\" (1.702 m)   Wt 136 lb 0.4 oz (61.7 kg)   SpO2 99%   BMI 21.30 kg/m²  Recent Labs     03/11/22  0008 03/11/22  1708 03/11/22  1929 03/12/22  0750   POCGLU 162* 57* 138* 101*       I/O last 3 completed shifts: In: 1550 [IV [id number]:2650]  Out: 525 [Urine:250; Other:275]  No intake/output data recorded. Neck: no adenopathy and supple, symmetrical, trachea midline  Lungs: expiratory wheezing and rhonchi bilaterally worse on rt.    Heart: regular rate and rhythm, 1/6 SM noted  Abdomen: soft, non-tender; bowel sounds normal; no masses,  no organomegaly  Extremities: extremities normal, atraumatic, no cyanosis or edema  Neurologic: moves all extremities. Nonfocal.     Data Review  CBC with Differential:    Recent Labs     03/10/22  1950 03/12/22  0435   WBC 16.6* 15.4*   RBC 4.82 4.30   HGB 12.3* 11.0*   HCT 41.3 35.0*    157   MCV 85.7 81.4   MCH 25.5* 25.6*   MCHC 29.8* 31.4*   RDW 19.6* 19.5*   SEGSPCT 92.8* 94.8*   LYMPHOPCT 2.3* 1.9*   MONOPCT 4.2* 2.9   BASOPCT 0.1 0.1   MONOSABS 0.7 0.4   LYMPHSABS 0.4 0.3   EOSABS 0.0 0.0   BASOSABS 0.0 0.0   DIFFTYPE AUTOMATED DIFFERENTIAL AUTOMATED DIFFERENTIAL     CMP:    Recent Labs     03/10/22  1947 03/11/22  1231 03/12/22  0435   * 124* 133*   K 6.1* 6.0* 5.7*   CL 82* 86* 93*   CO2 13* 14* 19*   BUN 63* 71* 78*   CREATININE 2.9* 3.1* 3.4*   GFRAA 19* 17* 16*   LABGLOM 15* 14* 13*   GLUCOSE 104* 59* 87   PROT 8.1  --   --    LABALBU 3.5  --  3.0*   CALCIUM 9.3 9.6 9.0   BILITOT 1.5*  --   --    ALKPHOS 189*  --   --    *  --   --    *  --   --      PT/INR:    Recent Labs     03/11/22  1145   PROTIME 23.7*   INR 1.83     Meds:    sodium chloride  500 mL IntraVENous Once    amiodarone  200 mg Oral Daily    sodium chloride flush  5-40 mL IntraVENous 2 times per day    heparin (porcine)  5,000 Units SubCUTAneous 3 times per day    vancomycin (VANCOCIN) intermittent dosing (placeholder)   Other See Admin Instructions    cefepime  1,000 mg IntraVENous Q12H    albuterol sulfate HFA  2 puff Inhalation 4x daily    ipratropium  2 puff Inhalation 4x daily    sodium zirconium cyclosilicate  10 g Oral TID    pantoprazole  40 mg IntraVENous Daily     PRN Meds: sodium chloride flush, sodium chloride, ondansetron **OR** ondansetron, polyethylene glycol, acetaminophen **OR** acetaminophen, glucose, glucagon (rDNA), dextrose, dextrose bolus (hypoglycemia), LORazepam    Assessment/Plan:   1.  Septic shock 2/2 multifocal pneumonia/parainfluenza infection: on Levophed, IV antibiotics. Appreciate consults from cardiology, pulmonology and nephrology. Check blood cultures, ua/urine culture. Wbc today 15. 4. Pro-Figueroa trending up, 1.07 today up from 0.778. Continue to trend wbc, pro-figueroa and lactic acid. 2. Multifocal pneumonia/parainfluenza infection: Continue IV cefepime/Vanco. Resp PCR + for parainfluenza infection. 3. Decompensated HFrEF: EF 20%. Pulmonary edema bilateral pleural effusions present on CT. Cardiology/pulmonolgy consults. Thoracentesis ordered. 4. Elevated LFTs: Hepatitis panel negative. Could be hypoxic liver. Hepatic function panel ordered to trend lft.   5. HORTENCIA/CKD: Hold nephrotoxic medications. Dr Josesito Yin following. Cr today 3.4  6. Hyperkalemia/hyponatremia in setting of HORTENCIA. Slight improvement today. K+ 5.7, Na+ 133. Dr Josesito Yin following. 7. PAD: arterial US reviewed. 8. HTN/CAD s/p PCI: STEMI November 2019.  9. Paroxysmal atrial fibrillation. Continue amiodarone  10. DM2  11. Moderate Aortic stenosis  12. Protein calorie malnutrition  13. DNR CCA. Does not want intubation. Spoke to Dr Josesito Yin. Daughter not present this am but was willing to consider temporary dialysis yesterday. Daughter not definitive with her decision making and letting pt have input. At this time cannot consider pt competent to make decisions. Dr Josesito Yin will discuss options with daughter but will discuss palliative/comfort measures. ---update 1205pm Dr Abiel Mao discussed with Dr Josesito Yin and will consult hospice. 14. DVT prophylaxis with heparin. Lisa Roberts PA-C,   3/12/2022 9:34 AM     Pt seen and examined. Labs, imaging, and noted reviewed. Agree with above.      Maren Bee MD.

## 2022-03-12 NOTE — PROGRESS NOTES
[address] [facility]  consulted by Dr. Benito Del Valle for monitoring and adjustment. Indication for treatment: Sepsis  Goal trough: [] 10-15 mcg/mL or [x] 15-20 mcg/ml  AUC/FRANDY: [] <500 or [x] 400-600    Pertinent Laboratory Values:   Temp Readings from Last 3 Encounters:   03/12/22 97.1 °F (36.2 °C) (Axillary)   05/12/20 97.3 °F (36.3 °C) (Oral)   05/06/20 97.6 °F (36.4 °C)     Recent Labs     03/10/22  1950 03/12/22  0435   WBC 16.6* 15.4*   LACTATE 8.4*  --      Recent Labs     03/10/22  1947 03/11/22  1231 03/12/22  0435   BUN 63* 71* 78*   CREATININE 2.9* 3.1* 3.4*     Estimated Creatinine Clearance: 12 mL/min (A) (based on SCr of 3.4 mg/dL (H)). Intake/Output Summary (Last 24 hours) at 3/12/2022 1234  Last data filed at 3/12/2022 0645  Gross per 24 hour   Intake --   Output 525 ml   Net -525 ml       Pertinent Cultures:  Date    Source    Results  3/11   Urine    Ordered    Vancomycin level:   TROUGH:  No results for input(s): VANCOTROUGH in the last 72 hours. RANDOM:    Recent Labs     03/12/22  0435   VANCORANDOM 11.7       Assessment:  · SCr, BUN, and urine output:  · HORTENCIA on CKD, SCR trending up to 3.4  · Baseline Scr 1.4  · UOP appears low  · Day(s) of therapy: 2  · Vancomycin concentration:  · 3/12 - 11.7, 28 hour level post initial 1000mg dose    Plan:  · Renal trends continue to decline, SCR up to 3.4 today. · Due to HORTENCIA on CKD, continue intermittent vancomycin dosing based on levels  · Vanco level subtherapeutic this am, ok to re-dose. · Give vancomycin 1000mg ivpb x1 dose today  · Recheck the vanco level in 2 days. · Pharmacy will continue to monitor patient and adjust therapy as indicated    Rona 3 3/14 @ 06:00    Thank you for the consult,  Otoniel Finley, [facility]  3/12/2022 12:34 PM

## 2022-03-12 NOTE — PROGRESS NOTES
Pulmonary and Critical Care  Progress Note    Subjective: The patient has improved. On N/C. Pl fluid transudate. Resp dis PCR: pos for parainfluenza. Shortness of breath has improved  Chest pain none  Addressing respiratory complaints Patient is negative for  hemoptysis and cyanosis  CONSTITUTIONAL:  negative for fevers and chills      Past Medical History:     has a past medical history of Abnormal CT scan, lung, Aortic stenosis, Arthritis, Atrial fibrillation ([address] CAD (coronary artery disease), Diabetes mellitus ([address] Heart murmur, History of cardiac pacemaker in situ, Hyperlipidemia, Hypertension, and Systolic heart failure ([address] has a past surgical history that includes Abdominal exploration surgery ([id number]); Cholecystectomy (2015); Hysterectomy (1985); eye surgery (Bilateral, 2000 & 2009); Tonsillectomy and adenoidectomy (1943); Pacemaker insertion (N/A, 5/2/2020); and Coronary angioplasty with stent (11/2019). reports that she quit smoking about 7 years ago. Her smoking use included cigarettes. She started smoking about 66 years ago. She has a 59.00 pack-year smoking history. She has never used smokeless tobacco. She reports previous alcohol use. She reports that she does not use drugs. Family history:  family history includes Heart Attack in her father; Heart Disease in her father and mother; High Blood Pressure in her father and mother.     Allergies   Allergen Reactions    Zinc     Pcn [Penicillins] Rash     Social History:    Reviewed; no changes    Objective:   PHYSICAL EXAM:        VITALS:  BP (!) 94/58   Pulse 77   Temp 97.1 °F (36.2 °C) (Axillary)   Resp 19   Ht 5' 7.01\" (1.702 m)   Wt 136 lb 0.4 oz (61.7 kg)   SpO2 97%   BMI 21.30 kg/m²     24HR INTAKE/OUTPUT:      Intake/Output Summary (Last 24 hours) at 3/12/2022 1201  Last data filed at 3/12/2022 0645  Gross per 24 hour   Intake --   Output 525 ml   Net -525 ml       CONSTITUTIONAL:  awake  LUNGS:  decreased breath sounds, basilar crackles. CARDIOVASCULAR:  normal S1 and S2 and positive JVD  ABD:Abdomen soft, non-tender. BS normal. No masses,  No organomegaly  NEURO:Alert. DATA:    CBC:  Recent Labs     03/10/22  1950 03/12/22  0435   WBC 16.6* 15.4*   RBC 4.82 4.30   HGB 12.3* 11.0*   HCT 41.3 35.0*    157   MCV 85.7 81.4   MCH 25.5* 25.6*   MCHC 29.8* 31.4*   RDW 19.6* 19.5*   SEGSPCT 92.8* 94.8*      BMP:  Recent Labs     03/10/22  1947 03/11/22  1231 03/12/22  0435   * 124* 133*   K 6.1* 6.0* 5.7*   CL 82* 86* 93*   CO2 13* 14* 19*   BUN 63* 71* 78*   CREATININE 2.9* 3.1* 3.4*   CALCIUM 9.3 9.6 9.0   GLUCOSE 104* 59* 87      ABG:  Recent Labs     03/11/22  0945   PH 7.35   PO2ART 80   JNO8SRH 33.0   O2SAT 93.6*     Lab Results   Component Value Date    PROBNP 32,588 (H) 03/12/2022    PROBNP 42,281 (H) 03/10/2022    PROBNP 24,577 (H) 05/12/2020     No results found for: CULTRESP    Radiology Review:  Pertinent images / reports were reviewed as a part of this visit. Assessment:     Patient Active Problem List   Diagnosis    Atrial flutter ([address]    Moderate malnutrition ([address]    Pneumonia of left lower lobe due to infectious organism    Aortic valve stenosis    Type 2 diabetes mellitus without complication, without long-term current use of insulin (HCC)    ST elevation MI (STEMI) ([address]    STEMI (ST elevation myocardial infarction) ([address]    Acute MI ([address]    Acute systolic (congestive) heart failure (HCC)    Acute metabolic encephalopathy    Shock liver    Aspiration pneumonia (HCC)    Acute on chronic systolic heart failure (HCC)    Presence of temporary transvenous cardiac pacemaker    Acute heart failure (HCC)    Systolic heart failure (HCC)    History of cardiac pacemaker in situ    CAD (coronary artery disease)    Atrial fibrillation ([address]    Aortic stenosis    Septic shock ([address]       Plan:   1. Overall the patient has improved. 2. [facility].     Claribel Park, MD  3/12/2022  12:01 PM

## 2022-03-12 NOTE — PROGRESS NOTES
Dr. Mer Clark was notified about pt condition being lethargic, unable to take oral medications currently.

## 2022-03-12 NOTE — CONSULTS
Consult dictated: [id number]    HFrEF   Last echo showed EF 20-30%  Hx afib, CAD  Continue amiodarone  On levo gtt for BP support  Patient is on hospice care at home   Continue supportive care       Echo 5/4/20  IMPRESSION:  1. There is a clot and thrombus noted in the left atrium and left  atrial appendage. 2.  Left atrium is severely enlarged. 3.  Moderate mitral regurgitation noted. 4.  EF is around 20% to 30% range present. 5.  Moderate-to-severe aortic stenosis present. 6.  Pacer wire is in the right side of the heart. 7.  There is pleural effusion noted, but no pericardial effusion noted.     The JANKI probe was removed with no complications.

## 2022-03-12 NOTE — PROGRESS NOTES
Nephrology Progress Note  3/12/2022 11:18 AM  Subjective: Interval History: Gustavo Dickerson is a 81 y.o. female with weakness fatigue just tired in bed        Data:   Scheduled Meds:   sodium chloride  500 mL IntraVENous Once    amiodarone  200 mg Oral Daily    sodium chloride flush  5-40 mL IntraVENous 2 times per day    heparin (porcine)  5,000 Units SubCUTAneous 3 times per day    vancomycin (VANCOCIN) intermittent dosing (placeholder)   Other See Admin Instructions    cefepime  1,000 mg IntraVENous Q12H    albuterol sulfate HFA  2 puff Inhalation 4x daily    ipratropium  2 puff Inhalation 4x daily    sodium zirconium cyclosilicate  10 g Oral TID    pantoprazole  40 mg IntraVENous Daily     Continuous Infusions:   norepinephrine-sodium chloride 4 mcg/min (03/12/22 1056)    sodium chloride      IV infusion builder 75 mL/hr at 03/12/22 0540    dextrose           CBC   Recent Labs     03/10/22  1950 03/12/22  0435   WBC 16.6* 15.4*   HGB 12.3* 11.0*   HCT 41.3 35.0*    157      BMP   Recent Labs     03/10/22  1947 03/11/22  1231 03/12/22  0435   * 124* 133*   K 6.1* 6.0* 5.7*   CL 82* 86* 93*   CO2 13* 14* 19*   PHOS  --   --  5.0*   BUN 63* 71* 78*   CREATININE 2.9* 3.1* 3.4*     Hepatic:   Recent Labs     03/10/22  1947   *   *   BILITOT 1.5*   ALKPHOS 189*     Troponin: No results for input(s): TROPONINI in the last 72 hours. BNP: No results for input(s): BNP in the last 72 hours. Lipids: No results for input(s): CHOL, HDL in the last 72 hours.     Invalid input(s): LDLCALCU  ABGs:   Lab Results   Component Value Date    PO2ART 80 03/11/2022    EAG0JTP 33.0 03/11/2022     INR:   Recent Labs     03/11/22  1145   INR 1.83     Renal Labs  Albumin:    Lab Results   Component Value Date    LABALBU 3.0 03/12/2022     Calcium:    Lab Results   Component Value Date    CALCIUM 9.0 03/12/2022     Phosphorus:    Lab Results   Component Value Date    PHOS 5.0 03/12/2022 U/A:    Lab Results   Component Value Date    NITRU NEGATIVE 03/11/2022    COLORU YELLOW 03/11/2022    WBCUA 43 03/11/2022    RBCUA 7 03/11/2022    MUCUS RARE 03/11/2022    TRICHOMONAS NONE SEEN 03/11/2022    YEAST FEW 09/11/2017    BACTERIA NEGATIVE 03/11/2022    CLARITYU CLEAR 03/11/2022    SPECGRAV 1.020 03/11/2022    UROBILINOGEN 0.2 03/11/2022    BILIRUBINUR NEGATIVE 03/11/2022    BLOODU TRACE 03/11/2022    KETUA TRACE 03/11/2022     ABG:    Lab Results   Component Value Date    EKK2KYG 33.0 03/11/2022    PO2ART 80 03/11/2022    IRP9UAB 18.2 03/11/2022     HgBA1c:    Lab Results   Component Value Date    LABA1C 6.9 09/12/2019     Microalbumen/Creatinine ratio:  No components found for: RUCREAT  TSH:  No results found for: TSH  IRON:    Lab Results   Component Value Date    IRON 40 04/30/2020     Iron Saturation:  No components found for: PERCENTFE  TIBC:    Lab Results   Component Value Date    TIBC 261 04/30/2020     FERRITIN:  No results found for: FERRITIN  RPR:  No results found for: RPR  MARY:  No results found for: ANATITER, MARY  24 Hour Urine for Creatinine Clearance:  No components found for: CREAT4, UHRS10, UTV10      Objective:   I/O: 03/11 0701 - 03/12 0700  In: -   Out: 525 [Urine:250]  I/O last 3 completed shifts: In: 1550 [IV [id number]:[id number]]  Out: 525 [Urine:250; Other:275]  No intake/output data recorded. Vitals: BP (!) 94/58   Pulse 77   Temp 97.1 °F (36.2 °C) (Axillary)   Resp 19   Ht 5' 7.01\" (1.702 m)   Wt 136 lb 0.4 oz (61.7 kg)   SpO2 97%   BMI 21.30 kg/m²  {  General appearance: awake weak  HEENT: Head: Normal, normocephalic, atraumatic.   Neck: supple, symmetrical, trachea midline  Lungs: diminished breath sounds bilaterally  Heart: S1, S2 normal  Abdomen: abnormal findings:  soft nt  Extremities: edema trace  Neurologic: Mental status: alertness: Awake but hard to arouse        Assessment and Plan:      IMP:  #1 acute renal failure from ATN  #2 hyperkalemia  #3 hyponatremia  #4 metabolic acidosis  #5 low urine output with possible urine retention  #6 type 2 diabetes  #7 possible septic shock  #8 congestive heart failure with ICD and aortic stenosis  #9 CODE STATUS/history of hospice    Plan     #1 renal function not improving discussed with family options daughter agrees at this time to do not escalate care I changed to a limited code resuscitative meds but nothing else.   If not improved by Monday will consult hospice to evaluate for comfort care measures  #2 treat electrolytes with medical therapy no other therapy beyond that  #3 renal function monitor with supportive medicines  #4 monitor blood pressure use pressors  #5 monitor glucose  #6 supportive care and above setting with possible viral infection if improved by Monday will allow to get better with medical management but if not improved will try to change to inpatient hospice and family aware and agree           Farhana Rosas MD, MD

## 2022-03-13 NOTE — CONSULTS
[address] [place] [address] Consult Note    Date: 3/13/2022  Name: Mercedes Puentes  MRN: [id number]  YOB: 1935   Patient's PCP: Ann Marie Duenas MD   Consultants during acute care: Pulmonary, Cardiology, Nephrology  Referring Physician: Dr Iline Holstein care admission date: 3/10/2022     Informant: Chart reviewed, discussed with the patient's nurse and Cardiology APRN. I examined the patient who is unable to provide any information due to clinical status/ confusion. There are no family currently present. CC: sepsis, hypotension    [place]: This is a 81 y.o. female with a history of heart failure with reduced ejection fraction  (20%), aortic stenosis not a candidate for TAVR/SAVR, coronary artery disease with prior LAD PCI November 2019 fr STEMI, atrial fibrillation, Type 2 diabetes mellitus, obstructive sleep apnea, Chronic Kidney Disease, COPD, Permanent pacemaker, history of LV thrombus April 2020, who had been on home hospice. admitted on 3/10/2022 with altered mental status, hallucinations, decreased urine output. The patient had been on hospice at home since May 8577 for Systolic Heart Failure and aortic stenosis. The patient's daughter revoked hospice care for acute admission and aggressive care, and the patient is admitted to the ICU, on NorEpinephrine. Resuscitation status is limited to medications without CPR, intubation, defibrillation. The patient is being treated for septic shock with multifocal pneumonia, anion gap metabolic acidosis, Acute Kidney Injury. COVID-19: negative but PCR was positive for Parainfluenza virus. Pro-BNP is elevated at 42,281 and repeated at 32,588, Procalcitonin 1.07, liver function tests are abnormal consistent with congestive or ischemic hepatopathy. CT chest shows bilateral pleural effusions, and there was right  thoracentesis 3/11/22 removing 275 ml of a transudate. The patient has been on Cefepime and Vancomycin.  She has not been alert enough to safely take oral meds or diet. She remains on low dose NorEpinephrine with low to low normal BP's. Dr Palma Fonseca notes reviewed regarding family wanting to continue with current medical care/support and reassess on 3/14/22. The patient is lethargic. She did open her eyes to my voice, but there is no conversation. When I asked her age, she said 63, which was the only speech. The patient is chronically ill appearing. Past Medical History:   Diagnosis Date    Abnormal CT scan, lung 09/12/2019    Peripheral consolidation in the right lower lobe, approximately 1.8 x 1.3 cm.   There is a calcified granuloma in the right lower lobe    Aortic stenosis     Arthritis     Hands, back    Atrial fibrillation (HCC)     CAD (coronary artery disease)     Diabetes mellitus (HCC)     Type II - diet controlled as of 10/2/19    Heart murmur     Dr. Rochelle Roper History of cardiac pacemaker in situ     Hyperlipidemia     Hypertension     Follows with PCP    Systolic heart failure (HCC)     LVEF 20-30%       Past Surgical History:   Procedure Laterality Date    ABDOMINAL EXPLORATION SURGERY  [id number]    distal gastrectomy, migel en y , cholecystectomy    CHOLECYSTECTOMY  2015    CORONARY ANGIOPLASTY WITH STENT PLACEMENT  11/2019    LAD per Dr Severiano Hdz Bilateral 2000 & 2009    cataracts   [facility] N/A 5/2/2020    PACEMAKER INSERTION PERMANENT performed by Alejandro Barrios MD at [facility]       Social History     Socioeconomic History    Marital status:      Spouse name: Not on file    Number of children: Not on file    Years of education: Not on file    Highest education level: Not on file   Occupational History    Not on file   Tobacco Use    Smoking status: Former Smoker     Packs/day: 1.00     Years: 59.00     Pack years: 59.00     Types: Cigarettes     Start date: [address] 65 date: 2015     Years since quittin.2    Smokeless tobacco: Never Used   Vaping Use    Vaping Use: Never used   Substance and Sexual Activity    Alcohol use: Not Currently     Alcohol/week: 0.0 standard drinks     Comment: No alcohol since , rarely before that    Drug use: No    Sexual activity: Not on file   Other Topics Concern    Not on file   Social History Narrative    Not on file     Social Determinants of Health     Financial Resource Strain:     Difficulty of Paying Living Expenses: Not on file   Food Insecurity:     Worried About Running Out of Food in the Last Year: Not on file    David of Food in the Last Year: Not on file   Transportation Needs:     Lack of Transportation (Medical): Not on file    Lack of Transportation (Non-Medical):  Not on file   Physical Activity:     Days of Exercise per Week: Not on file    Minutes of Exercise per Session: Not on file   Stress:     Feeling of Stress : Not on file   Social Connections:     Frequency of Communication with Friends and Family: Not on file    Frequency of Social Gatherings with Friends and Family: Not on file    Attends [employer] Services: Not on file    Active Member of [address] [place] [address] [employer] or Organizations: Not on file    Attends Club or Organization Meetings: Not on file    Marital Status: Not on file   Intimate Partner Violence:     Fear of Current or Ex-Partner: Not on file    Emotionally Abused: Not on file    Physically Abused: Not on file    Sexually Abused: Not on file   Housing Stability:     Unable to Pay for Housing in the Last Year: Not on file    Number of [place] in the Last Year: Not on file    Unstable Housing in the Last Year: Not on file       Family History   Problem Relation Age of Onset    Heart Disease Mother     High Blood Pressure Mother     Heart Disease Father     High Blood Pressure Father     Heart Attack Father        Allergies   Allergen Reactions    Zinc     Pcn [Penicillins] Rash oz (63.7 kg)   10/15/19 161 lb (73 kg)       Data reviewed 3/13/2022:  Transesophageal echocardiogram 4/30/2020:   Left ventricular systolic function is abnormal.   Ejection fraction is visually estimated at 25%.   Severe aortic stenosis.   Thinned and mobile interatrial septum with bowing towards the right heart suggestive of pressure overload.   There are two clots noted within the left atrial appendage. One is a mural   thrombus and the other is mobile and appears to be attached at the coumadin ridge.   No pericardial effusion . Cardiac catheterization/PCI 11/5/2019 Dr Sarah Marte [facility]:  DEYHEDBM--[id number]  LEFT MAIN PATENT  LAD % TO 0% JOAQUÍN RESOLUTE 2.5X38MM STENT, CRYSTAL-O-TO CRYSTAL-III FLOW  LCX MILD DX  RCA MILD DX  LVEDP 20  ASA AND HEPARIN AND BRILIANTA AND AGGROSTAT  ANGIOSEAL     CT Chest 3/10/22:  Impression   1. Significant patchy ground-glass and consolidative opacities in the   bilateral lower and right middle lobes suspicious for multifocal pneumonia or   aspiration. Pulmonary edema is also a consideration. 2. Moderate right pleural effusion with smooth pleural thickening suggesting   an exudative effusion. Small left pleural effusion. 3. Cardiomegaly. 4. Mildly enlarged main pulmonary artery as can be seen with pulmonary   hypertension. 5. Mildly enlarged nonspecific distal right paratracheal lymph node likely   reactive. US RUQ 3/10:  Impression   Status post cholecystectomy. No significant findings. Arterial Lower Ext US 3/11:  Impression   Moderate to severe peripheral arterial disease is identified, with multifocal   atherosclerotic plaque and heavy trifurcation disease based on waveform   analysis. No severe discrete focal stenosis based on velocity criteria. Occlusion of the distal peroneal artery seen bilaterally. Chest X-ray  3/12/22  Left chest pacemaker in stable positioning. Bilateral airspace opacities,   grossly unchanged.   Small to moderate right pleural effusion, grossly   unchanged. Stable cardiomediastinal silhouette. No acute osseous   abnormality. Pro-BNP: 42,281 => 32,588    Hepatic Function Panel:    Lab Results   Component Value Date    ALKPHOS 216 03/12/2022     03/12/2022     03/12/2022    PROT 5.9 03/12/2022    PROT 7.4 04/10/2012    BILITOT 1.6 03/12/2022    BILIDIR 1.2 03/12/2022    IBILI 0.4 03/12/2022    LABALBU 2.7 03/13/2022     CBC:   Recent Labs     03/10/22  1950 03/12/22  0435 03/13/22  0615   WBC 16.6* 15.4* 10.6*   HGB 12.3* 11.0* 10.3*   HCT 41.3 35.0* 31.9*   MCV 85.7 81.4 81.4    157 129*     BMP:   Recent Labs     03/11/22  1231 03/12/22  0435 03/13/22  0615   * 133* 131*   K 6.0* 5.7* 4.1   CL 86* 93* 91*   CO2 14* 19* 20*   BUN 71* 78* 82*   CREATININE 3.1* 3.4* 3.0*   GLUCOSE 59* 87 92         Physical Exam:   BP 94/67   Pulse 109   Temp 96 °F (35.6 °C) (Axillary)   Resp 23   Ht 5' 7.01\" (1.702 m)   Wt 136 lb 0.4 oz (61.7 kg)   SpO2 97%   BMI 21.30 kg/m²   General: lethargic, chronically ill appearing, open mouth breathing  Skin: sallow, scattered bruising and chronic changes in the legs   HEENT: Mucous membranes are dry, sclerae are clear. bitemporal wasting   Neck: difficult to visualize JVP secondary to positioning, carotid upstrokes are diminished without bruit  Chest pacer present in the left upper chest  Heart: distant tones, tachycardic IRRR, S1S2, II/VI SHELLEY at left sternal border  Lungs:  coarse breath sounds bilaterally, diminished at the bases, with basilar rales, scattered rhonchi   Abdomen: soft, bowel sounds quietly present, no apparent tenderness, nondistended  Extremities:  No mottling, edema  Neurologic: lethargic, moves extremities spontaneously but not to command    Assessment/Plan:  1. Septic shock with multifocal pneumonia and Parainfluenza virus infection, possible secondary bacterial infection.  She is on broad spectrum antibiotics, NorEpinephrine and supportive care, but remains lethargic with guarded prognosis and underlying medical problems. The patient was on hospice support at home that was revoked for ICU care. Will follow for plan of care. 2. Decompensated heart failure with reduced ejection fraction with LVEF 20-30% with valvular heart disease, pleural effusion  3. Acute Kidney Injury on CKD  4. Transaminitis, likely congestive or ischemic hepatopathy  5. Aortic stenosis, not felt to be a candidate for TAVR   6. Coronary artery disease with STEMI 11/2019 and LAD PCI, with history of cardiogenic shock and Impella  7. Bradycardia with Permanent pacemaker 5/2/2020  8. History of LA thrombus  9. Type 2 diabetes mellitus  10. Atrial fibrillation/flutter  11. Protein calorie malnutrition, severe      Patient Active Problem List   Diagnosis Code    Atrial flutter (HCC) I48.92    Moderate malnutrition (HCC) E44.0    Pneumonia of left lower lobe due to infectious organism J18.9    Aortic valve stenosis I35.0    Type 2 diabetes mellitus without complication, without long-term current use of insulin (HCC) E11.9    ST elevation MI (STEMI) (HCC) I21.3    STEMI (ST elevation myocardial infarction) (HCC) I21.3    Acute MI (HCC) B93.7    Acute systolic (congestive) heart failure (HCC) I50.21    Acute metabolic encephalopathy I69.04    Shock liver K72.00    Aspiration pneumonia (HCC) J69.0    Acute on chronic systolic heart failure (HCC) I50.23    Presence of temporary transvenous cardiac pacemaker Z95.0    Acute heart failure (HCC) Z66.6    Systolic heart failure (HCC) I50.20    History of cardiac pacemaker in situ Z95.0    CAD (coronary artery disease) I25.10    Atrial fibrillation (HCC) I48.91    Aortic stenosis I35.0    Septic shock (HCC) A41.9, R65.21         JOY Quiroz MD

## 2022-03-13 NOTE — PROGRESS NOTES
[facility]  consulted by Dr. Suzan Chase for monitoring and adjustment. Indication for treatment: Sepsis  Goal trough: [] 10-15 mcg/mL or [x] 15-20 mcg/ml  AUC/FRANDY: [] <500 or [x] 400-600    Pertinent Laboratory Values:   Temp Readings from Last 3 Encounters:   03/13/22 96.7 °F (35.9 °C) (Axillary)   05/12/20 97.3 °F (36.3 °C) (Oral)   05/06/20 97.6 °F (36.4 °C)     Recent Labs     03/10/22  1950 03/12/22  0435 03/12/22  1845 03/13/22  0615   WBC 16.6* 15.4*  --  10.6*   LACTATE 8.4*  --  1.6 1.6     Recent Labs     03/11/22  1231 03/12/22  0435 03/13/22  0615   BUN 71* 78* 82*   CREATININE 3.1* 3.4* 3.0*     Estimated Creatinine Clearance: 13 mL/min (A) (based on SCr of 3 mg/dL (H)). Intake/Output Summary (Last 24 hours) at 3/13/2022 1628  Last data filed at 3/13/2022 0620  Gross per 24 hour   Intake 1400.92 ml   Output 860 ml   Net 540.92 ml       Pertinent Cultures:  Date    Source    Results  3/11   Urine    Ordered    Vancomycin level:   TROUGH:  No results for input(s): VANCOTROUGH in the last 72 hours. RANDOM:    Recent Labs     03/12/22  0435   VANCORANDOM 11.7       Assessment:  · SCr, BUN, and urine output:  · HORTENCIA on CKD, SCR slightly improved @3  · Baseline Scr 1.4  · UOP may be low  · Day(s) of therapy: 3  · Vancomycin concentration:  · 3/12 - 11.7, 28 hour level post initial 1000mg dose    Plan:  · Due to HORTENCIA on CKD, continue intermittent vancomycin dosing based on levels  · Vanco level subtherapeutic yesterday, vancomycin 1000mg ivpb x1 dose given. · Recheck the vanco level tomorrow am.  · Pharmacy will continue to monitor patient and adjust therapy as indicated    Rona 3 3/14 @ 06:00    Thank you for the consult,  Diomedes Cheema.  [facility], [address] [place] [address]  3/13/2022 4:28 PM

## 2022-03-13 NOTE — PROGRESS NOTES
Nephrology Progress Note  3/13/2022 10:57 AM  Subjective: Interval History: Maurilio Stauffer is a 81 y.o. female more lethargic overall decline      Data:   Scheduled Meds:   sodium chloride  500 mL IntraVENous Once    amiodarone  200 mg Oral Daily    sodium chloride flush  5-40 mL IntraVENous 2 times per day    heparin (porcine)  5,000 Units SubCUTAneous 3 times per day    vancomycin (VANCOCIN) intermittent dosing (placeholder)   Other See Admin Instructions    cefepime  1,000 mg IntraVENous Q12H    albuterol sulfate HFA  2 puff Inhalation 4x daily    ipratropium  2 puff Inhalation 4x daily    pantoprazole  40 mg IntraVENous Daily     Continuous Infusions:   IV infusion builder 75 mL/hr at 03/13/22 1001    norepinephrine-sodium chloride 4 mcg/min (03/12/22 1056)    sodium chloride      dextrose           CBC   Recent Labs     03/10/22  1950 03/12/22  0435 03/13/22  0615   WBC 16.6* 15.4* 10.6*   HGB 12.3* 11.0* 10.3*   HCT 41.3 35.0* 31.9*    157 129*      BMP   Recent Labs     03/11/22  1231 03/12/22  0435 03/13/22  0615   * 133* 131*   K 6.0* 5.7* 4.1   CL 86* 93* 91*   CO2 14* 19* 20*   PHOS  --  5.0* 4.9   BUN 71* 78* 82*   CREATININE 3.1* 3.4* 3.0*     Hepatic:   Recent Labs     03/10/22  1947 03/12/22  1848   * 679*   * 687*   BILITOT 1.5* 1.6*   ALKPHOS 189* 216*     Troponin: No results for input(s): TROPONINI in the last 72 hours. BNP: No results for input(s): BNP in the last 72 hours. Lipids: No results for input(s): CHOL, HDL in the last 72 hours.     Invalid input(s): LDLCALCU  ABGs:   Lab Results   Component Value Date    PO2ART 80 03/11/2022    HCI5KAT 33.0 03/11/2022     INR:   Recent Labs     03/11/22  1145   INR 1.83     Renal Labs  Albumin:    Lab Results   Component Value Date    LABALBU 2.7 03/13/2022     Calcium:    Lab Results   Component Value Date    CALCIUM 8.1 03/13/2022     Phosphorus:    Lab Results   Component Value Date    PHOS 4.9 03/13/2022     U/A:    Lab Results   Component Value Date    NITRU NEGATIVE 03/11/2022    COLORU YELLOW 03/11/2022    WBCUA 43 03/11/2022    RBCUA 7 03/11/2022    MUCUS RARE 03/11/2022    TRICHOMONAS NONE SEEN 03/11/2022    YEAST FEW 09/11/2017    BACTERIA NEGATIVE 03/11/2022    CLARITYU CLEAR 03/11/2022    SPECGRAV 1.020 03/11/2022    UROBILINOGEN 0.2 03/11/2022    BILIRUBINUR NEGATIVE 03/11/2022    BLOODU TRACE 03/11/2022    KETUA TRACE 03/11/2022     ABG:    Lab Results   Component Value Date    BHU7JCC 33.0 03/11/2022    PO2ART 80 03/11/2022    WOB5DYH 18.2 03/11/2022     HgBA1c:    Lab Results   Component Value Date    LABA1C 6.9 09/12/2019     Microalbumen/Creatinine ratio:  No components found for: RUCREAT  TSH:  No results found for: TSH  IRON:    Lab Results   Component Value Date    IRON 40 04/30/2020     Iron Saturation:  No components found for: PERCENTFE  TIBC:    Lab Results   Component Value Date    TIBC 261 04/30/2020     FERRITIN:  No results found for: FERRITIN  RPR:  No results found for: RPR  MARY:  No results found for: ANATITER, MARY  24 Hour Urine for Creatinine Clearance:  No components found for: CREAT4, UHRS10, UTV10      Objective:   I/O: 03/12 0701 - 03/13 0700  In: 1400.9 [I.V.:1100.9]  Out: 860 [Urine:860]  I/O last 3 completed shifts: In: 1400.9 [I.V.:1100.9; IV Piggyback:300]  Out: 1110 [Urine:1110]  No intake/output data recorded. Vitals: BP 94/67   Pulse 109   Temp 96 °F (35.6 °C) (Axillary)   Resp 23   Ht 5' 7.01\" (1.702 m)   Wt 136 lb 0.4 oz (61.7 kg)   SpO2 97%   BMI 21.30 kg/m²  {  General appearance: Hard to arouse  HEENT: Head: Normal, normocephalic, atraumatic. Neck: supple, symmetrical, trachea midline  Lungs: diminished breath sounds bilaterally  Heart: S1, S2 tachycardic  Abdomen: abnormal findings:  soft nt  Extremities: edema trace  Neurologic: Mental status: alertness:  Allergic        Assessment and Plan:      IMP:  #1 acute renal failure from ATN  #2 hyperkalemia  #3 hyponatremia  #4 metabolic acidosis  #5 low urine output with possible urine retention  #6 type 2 diabetes  #7 possible septic shock  #8 congestive heart failure with ICD and aortic stenosis  #9 CODE STATUS/history of hospice    Plan     #1 creatinine down to 3.0 will monitor for now plan is for dialysis per family  #2 electrolytes holding stable maintain supportive care  #3 monitor urination in the setting of hydration as able  #4 monitor glucose control  #5 blood pressure low stable medicines  #6 new left atrial thrombus noted cardiology on the case with options of treatment plan  #7 overall health is declining not overall improving plan is to work with hospice tomorrow to make plans for comfort care           Adolfo Zacarias MD, MD

## 2022-03-13 NOTE — PROGRESS NOTES
INTERNAL MEDICINE PROGRESS NOTE        Zeenat Todd   1935   Primary Care Physician:  Enoch Santamaria MD  Admit Date: 3/10/2022     Subjective:   Pt status remains guarded today. Shakes head and answers yes/no to questions. Drowsy but arousable. Most speech garbled and incomprehensible. Denies pain, denies cp. Denies SOB but question her competency. She remains on levophed drip. BP 82/63 during eval and mild tachycardia to 105-108.     3/12/2022 CXR:  Impression   1. Grossly unchanged bilateral airspace opacities. 2. Grossly unchanged small to moderate right pleural effusion. US Retroperitoneal 3/12/2022:  Impression   Unremarkable ultrasound of the kidneys. Ct chest 3/10:  Impression   1. Significant patchy ground-glass and consolidative opacities in the   bilateral lower and right middle lobes suspicious for multifocal pneumonia or   aspiration.  Pulmonary edema is also a consideration. 2. Moderate right pleural effusion with smooth pleural thickening suggesting   an exudative effusion.  Small left pleural effusion. 3. Cardiomegaly. 4. Mildly enlarged main pulmonary artery as can be seen with pulmonary   hypertension. 5. Mildly enlarged nonspecific distal right paratracheal lymph node likely   reactive. US RUQ 3/10:  Impression   Status post cholecystectomy.  No significant findings. Arterial Lower Ext US 3/11:  Impression   Moderate to severe peripheral arterial disease is identified, with multifocal   atherosclerotic plaque and heavy trifurcation disease based on waveform   analysis.  No severe discrete focal stenosis based on velocity criteria.       Occlusion of the distal peroneal artery seen bilaterally.            Objective:   BP 94/67   Pulse 109   Temp 96 °F (35.6 °C) (Axillary)   Resp 23   Ht 5' 7.01\" (1.702 m)   Wt 136 lb 0.4 oz (61.7 kg)   SpO2 97%   BMI 21.30 kg/m²    Recent Labs     03/12/22  1142 03/12/22  1727 03/12/22  1948 03/13/22  0746 POCGLU 99 102* 88 131*       I/O last 3 completed shifts: In: 1400.9 [I.V.:1100.9; IV Piggyback:300]  Out: 1110 [Urine:1110]  No intake/output data recorded. Neck: no adenopathy and supple, symmetrical, trachea midline  Lungs: expiratory wheezing and rhonchi bilaterally    Heart: 1/6 SM noted, tachycardic, occasionally irregular rhythm. Abdomen: soft, non-tender; bowel sounds normal; no masses,  no organomegaly  Extremities: extremities normal, atraumatic, no cyanosis or edema  Neurologic: moves all extremities. Nonfocal.     Data Review  CBC with Differential:    Recent Labs     03/10/22  1950 03/12/22  0435 03/13/22  0615   WBC 16.6* 15.4* 10.6*   RBC 4.82 4.30 3.92*   HGB 12.3* 11.0* 10.3*   HCT 41.3 35.0* 31.9*    157 129*   MCV 85.7 81.4 81.4   MCH 25.5* 25.6* 26.3*   MCHC 29.8* 31.4* 32.3   RDW 19.6* 19.5* 19.9*   SEGSPCT 92.8* 94.8*  --    LYMPHOPCT 2.3* 1.9*  --    MONOPCT 4.2* 2.9  --    BASOPCT 0.1 0.1  --    MONOSABS 0.7 0.4  --    LYMPHSABS 0.4 0.3  --    EOSABS 0.0 0.0  --    BASOSABS 0.0 0.0  --    DIFFTYPE AUTOMATED DIFFERENTIAL AUTOMATED DIFFERENTIAL  --      CMP:    Recent Labs     03/10/22  1947 03/10/22  1947 03/11/22  1231 03/12/22  0435 03/12/22  1848 03/13/22  0615   *   < > 124* 133*  --  131*   K 6.1*   < > 6.0* 5.7*  --  4.1   CL 82*   < > 86* 93*  --  91*   CO2 13*   < > 14* 19*  --  20*   BUN 63*   < > 71* 78*  --  82*   CREATININE 2.9*   < > 3.1* 3.4*  --  3.0*   GFRAA 19*   < > 17* 16*  --  18*   LABGLOM 15*   < > 14* 13*  --  15*   GLUCOSE 104*   < > 59* 87  --  92   PROT 8.1  --   --   --  5.9*  --    LABALBU 3.5   < >  --  3.0* 2.6* 2.7*   CALCIUM 9.3   < > 9.6 9.0  --  8.1*   BILITOT 1.5*  --   --   --  1.6*  --    ALKPHOS 189*  --   --   --  216*  --    *  --   --   --  679*  --    *  --   --   --  687*  --     < > = values in this interval not displayed.      PT/INR:    Recent Labs     03/11/22  1145   PROTIME 23.7*   INR 1.83     Meds:    sodium chloride  500 mL IntraVENous Once    amiodarone  200 mg Oral Daily    sodium chloride flush  5-40 mL IntraVENous 2 times per day    heparin (porcine)  5,000 Units SubCUTAneous 3 times per day    vancomycin (VANCOCIN) intermittent dosing (placeholder)   Other See Admin Instructions    cefepime  1,000 mg IntraVENous Q12H    albuterol sulfate HFA  2 puff Inhalation 4x daily    ipratropium  2 puff Inhalation 4x daily    pantoprazole  40 mg IntraVENous Daily     PRN Meds: sodium chloride flush, sodium chloride, ondansetron **OR** ondansetron, polyethylene glycol, acetaminophen **OR** acetaminophen, glucose, glucagon (rDNA), dextrose, dextrose bolus (hypoglycemia), LORazepam    Assessment/Plan:   1. Septic shock 2/2 multifocal pneumonia/parainfluenza infection: on Levophed, IV antibiotics. Appreciate consults from cardiology, pulmonology and nephrology. Blood culture and urine culture no growth at this time. Lactic acid normal today. Wbc today 10.6, trending down. Pro-Figueroa pending at time of notation, was 1.07 yesterday. Continue to trend wbc, pro-figueroa and lactic acid. 2. Multifocal pneumonia/parainfluenza infection: Continue IV cefepime/Vanco. Resp PCR + for parainfluenza infection. 3. Decompensated HFrEF: EF 20%. Pulmonary edema bilateral pleural effusions present on CT. Cardiology/pulmonolgy consults. Thoracentesis ordered. 4. Elevated LFTs: Hepatitis panel negative. Total bili stable but ALT/AST/Alk Phos increasing. Possible related to hypoxic liver injury. Will hold on GI eval at this time due to palliative/hospice considerations. 5. HORTENCIA/CKD: Hold nephrotoxic medications. Dr Lisa Michael following. Cr today 3.0  6. Hyperkalemia/hyponatremia in setting of HORTENCIA. Hyperkalemia resolved at this time, K+ 4.1. Na+ 131. Dr Lisa Michael following. 7. PAD: arterial US reviewed. 8. HTN/CAD s/p PCI: STEMI November 2019.  9. Paroxysmal atrial fibrillation. Continue amiodarone  10. DM2  11. Moderate Aortic stenosis  12. Protein calorie malnutrition  13. DNR CCA. Does not want intubation. Palliative orders and hospice consult placed. 14. DVT prophylaxis with heparin. Serina Gordon PA-C,   3/13/2022 9:59 AM     Pt seen and examined. Labs, imaging, and noted reviewed. Agree with above.      Luciano Perrin MD.

## 2022-03-13 NOTE — CONSULTS
[address], [address] [place] [address]                                  CONSULTATION    PATIENT NAME: Frank Jasso                   :        1935  MED REC NO:   [id number]                          ROOM:       2007  ACCOUNT NO:   [de-identified]                           ADMIT DATE: 03/10/2022  PROVIDER:     Isabel Spicer    CONSULT DATE:  2022    INDICATION:  Heart failure with reduced ejection fraction. HISTORY OF PRESENT ILLNESS:  The patient is an 52-year-old female with  past medical history of atrial fibrillation, hypertension,  hyperlipidemia, diabetes, coronary artery disease status post PCI, heart  failure with reduced ejection with EF around 20%. The patient is with  hospice care at home and was brought in to the emergency department 2  days ago for weakness and dyspnea. Per family, the patient was  hallucinating. The patient was found to be hypotensive on arrival and  was placed on Levophed. ProBNP was elevated at 42,000. Troponin was  negative. CT chest showed patchy ground-glass and consolidative  opacities suspicious for multifocal pneumonia or pulmonary edema as well  as bilateral pleural effusions and cardiomegaly. The patient was noted  to have acute kidney injury as well, creatinine of 2.9 and hyperkalemia  with a potassium of 6.1. Cardiology was consulted for further  management of heart failure. PAST MEDICAL HISTORY:  Aortic stenosis, atrial fibrillation, coronary  artery disease, hyperlipidemia, hypertension, and heart failure with  reduced ejection fraction. PAST SURGICAL HISTORY:  Cholecystectomy, coronary angioplasty with stent  placement, hysterectomy, pacemaker insertion, tonsillectomy,  cholecystectomy and distal gastrectomy, Conner-en-Y. ALLERGIES:  ZINC AND PENICILLIN.     REVIEW OF SYSTEMS:  A 10-point review of systems was complete and  negative unless noted in the HPI.    PHYSICAL EXAMINATION:  GENERAL:  The patient is lethargic, difficult to arouse. HEENT:  Head is normocephalic and atraumatic. CHEST:  Equal expansion bilaterally. PULMONARY:  Expiratory wheezes and rhonchi bilaterally, greater on the  right. CARDIOVASCULAR:  S1 and S2 auscultated. Heart rate and rhythm are  regular. ABDOMEN:  Soft and nontender. Bowel sounds present in all quadrants. EXTREMITIES:  Atraumatic. No cyanosis or edema. NEUROLOGIC:  Lethargic. Difficult to arouse. No focal deficits noted. LABORATORY DATA:  Potassium 5.7, sodium 133, CO2 of 19. BUN 78,  creatinine 3.4. Anion gap 21. ProBNP 32,588. Albumin is low at 3.0. White count is elevated at 15.4, hemoglobin 11. IMAGING STUDIES:  Portable chest x-ray shows grossly unchanged bilateral  airspace opacities, unchanged mild-to-moderate pleural effusion. CT  chest on 03/10/2022 showed significant patchy ground-glass and  consolidative opacities and bilateral lower and right middle lobe  suspicious for multifocal pneumonia or aspiration, pulmonary edema is  also a consideration. Also showed moderate right and small left pleural  effusion, mildly enlarged main pulmonary artery. EKG showed dual-paced  rhythm. IMPRESSION:  This is an 79-year-old female with past medical history of  AFib, hypertension, hyperlipidemia, diabetes, coronary artery disease,  heart failure with reduced ejection fracture with EF around 20%, and  moderate aortic stenosis who was brought in for weakness and dyspnea. Of note, the patient is currently on hospice care at home. The patient  was hypotensive despite fluid resuscitation. Levophed drip was initiated. BNP is elevated at 32,588 and potassium is elevated. Cardiology is consulted   to manage heart failure. Continue amiodarone and supportive  care. Further recommendations pending hospital course.         Tom Duenas    D: 03/12/2022 14:42:50       T: 03/12/2022 18:48:22 [id number]  Job#: [id number]     Doc#: [id number]    CC:

## 2022-03-13 NOTE — PROGRESS NOTES
Daily Progress Note    Patient drowsy, responds to questions appropriately   Paced on tele, tachycardic   BP stable on pressors   Patient is on hospice care at home   Family to revaluate plan of care tomorrow   Continue supportive care     HFrEF    Last echo showed EF 20- 30%     Pulmonary edema and bilateral pleural effusions noted on CT     Diuretics per renal     Hx Paroxysmal Afib     Continue amiodarone     Hx CAD    STEMI in 2019 s/p PCI        HORTENCIA    Nephro following     Hyperkalemia in the setting of HORTENCIA     K now 4.1     Continue to monitor    Will continue to follow      Echo 5/4/20  IMPRESSION:  1.  There is a clot and thrombus noted in the left atrium and left  atrial appendage. 2.  Left atrium is severely enlarged. 3.  Moderate mitral regurgitation noted. 4.  EF is around 20% to 30% range present. 5.  Moderate-to-severe aortic stenosis present. 6.  Pacer wire is in the right side of the heart. 7.  There is pleural effusion noted, but no pericardial effusion noted.     The JANKI probe was removed with no complications. PAST MEDICAL HISTORY:  Aortic stenosis, atrial fibrillation, coronary  artery disease, hyperlipidemia, hypertension, and heart failure with  reduced ejection fraction.     PAST SURGICAL HISTORY:  Cholecystectomy, coronary angioplasty with stent  placement, hysterectomy, pacemaker insertion, tonsillectomy,  cholecystectomy and distal gastrectomy, Conner-en-Y.     ALLERGIES:  ZINC AND PENICILLIN.     Objective:   BP 94/67   Pulse 109   Temp 96 °F (35.6 °C) (Axillary)   Resp 23   Ht 5' 7.01\" (1.702 m)   Wt 136 lb 0.4 oz (61.7 kg)   SpO2 97%   BMI 21.30 kg/m²     Intake/Output Summary (Last 24 hours) at 3/13/2022 0842  Last data filed at 3/13/2022 4480  Gross per 24 hour   Intake 1400.92 ml   Output 860 ml   Net 540.92 ml       Medications:   Scheduled Meds:   sodium chloride  500 mL IntraVENous Once    amiodarone  200 mg Oral Daily    sodium chloride flush  5-40 mL IntraVENous 2 times per day    heparin (porcine)  5,000 Units SubCUTAneous 3 times per day    vancomycin (VANCOCIN) intermittent dosing (placeholder)   Other See Admin Instructions    cefepime  1,000 mg IntraVENous Q12H    albuterol sulfate HFA  2 puff Inhalation 4x daily    ipratropium  2 puff Inhalation 4x daily    pantoprazole  40 mg IntraVENous Daily      Infusions:   norepinephrine-sodium chloride 4 mcg/min (03/12/22 1056)    sodium chloride      IV infusion builder 75 mL/hr at 03/12/22 1943    dextrose        PRN Meds:  sodium chloride flush, sodium chloride, ondansetron **OR** ondansetron, polyethylene glycol, acetaminophen **OR** acetaminophen, glucose, glucagon (rDNA), dextrose, dextrose bolus (hypoglycemia), LORazepam       Physical Exam:  Vitals:    03/13/22 0741   BP:    Pulse:    Resp: 23   Temp:    SpO2: 97%        General: A&Ox4, NAD  Chest: Nontender  Cardiac: s1 and s2 auscultated, tachycardia    Lungs: Expiratory wheezes noted bilaterally   Abdomen: Soft, NT, ND, +BS  Extremities: no cyanosis or edema   Vascular:  Equal 2+ peripheral pulses. Lab Data:  CBC:   Recent Labs     03/10/22  1950 03/12/22  0435 03/13/22  0615   WBC 16.6* 15.4* 10.6*   HGB 12.3* 11.0* 10.3*   HCT 41.3 35.0* 31.9*   MCV 85.7 81.4 81.4    157 129*     BMP:   Recent Labs     03/11/22  1231 03/12/22  0435 03/13/22  0615   * 133* 131*   K 6.0* 5.7* 4.1   CL 86* 93* 91*   CO2 14* 19* 20*   PHOS  --  5.0* 4.9   BUN 71* 78* 82*   CREATININE 3.1* 3.4* 3.0*     LIVER PROFILE:   Recent Labs     03/10/22  1947 03/12/22  1848   * 679*   * 687*   BILIDIR  --  1.2*   BILITOT 1.5* 1.6*   ALKPHOS 189* 216*     PT/INR:   Recent Labs     03/11/22  1145   PROTIME 23.7*   INR 1.83     APTT:   Recent Labs     03/11/22  1145   APTT 35.7     BNP:  No results for input(s): BNP in the last 72 hours.       Assessment:  Patient Active Problem List    Diagnosis Date Noted    Septic shock ([facility] 03/11/2022    Acute heart failure ([address] 41/38/0484    Systolic heart failure (HCC)     History of cardiac pacemaker in situ     CAD (coronary artery disease)     Atrial fibrillation ([address]     Aortic stenosis     Presence of temporary transvenous cardiac pacemaker 05/01/2020    Acute on chronic systolic heart failure ([address] 56/53/7521    Acute systolic (congestive) heart failure ([address] 11/08/2019    Acute metabolic encephalopathy 48/38/2316    Shock liver 11/08/2019    Aspiration pneumonia ([address] 11/08/2019    Acute MI ([address] 11/07/2019    ST elevation MI (STEMI) ([address] 11/06/2019    STEMI (ST elevation myocardial infarction) ([address] 11/06/2019    Pneumonia of left lower lobe due to infectious organism     Aortic valve stenosis     Type 2 diabetes mellitus without complication, without long-term current use of insulin ([address]     Moderate malnutrition ([address] 09/13/2019    Atrial flutter ([address] 09/12/2019       Electronically signed by STEPHANIE Loja CNP on 3/13/2022 at 8:42 AM

## 2022-03-14 NOTE — PROGRESS NOTES
Dr. Mccracken Less notified of family's wishes.  Verbal orders over phone received to change code status to DNR CC.

## 2022-03-14 NOTE — PROGRESS NOTES
Time of death 65. Confirmed with Ariane HAYS and Rhoda RN. Pacemaker magnet used to turn off pacemaker. Family at bedside. Comfort bags given.

## 2022-03-14 NOTE — PROGRESS NOTES
[address] [place] [address]  Progress Note    Date: 3/14/2022  Name: Sera Petersen  MRN: [id number]  YOB: 1935   Patient's PCP: Debo Larios MD   Consultants during acute care: Pulmonary, Cardiology, Nephrology  Admit Date: 3/10/2022      Subjective: The patient is a bit more alert this morning, eyes are open, she denied pain when asked. She said she was not hungry. No family are here. Objective:   Pain is managed with Acetaminophen with no use, Lorazepam is available for anxiety with no use since 3/11/22. The patient is on NorEpinephrine at 3 mcg/min. Data reviewed 3/14/2022:  Transesophageal echocardiogram 4/30/2020:   Left ventricular systolic function is abnormal.   Ejection fraction is visually estimated at 25%.   Severe aortic stenosis.   Thinned and mobile interatrial septum with bowing towards the right heart suggestive of pressure overload.   There are two clots noted within the left atrial appendage. One is a mural   thrombus and the other is mobile and appears to be attached at the coumadin ridge.   No pericardial effusion .     Cardiac catheterization/PCI 11/5/2019 Dr Luz Elena Goldstein [facility]:  [id number]--[id number]  LEFT MAIN PATENT  LAD % TO 0% JOAQUÍN RESOLUTE 2.5X38MM STENT, CRYSTAL-O-TO CRYSTAL-III FLOW  LCX MILD DX  RCA MILD DX  LVEDP 20  ASA AND HEPARIN AND BRILIANTA AND AGGROSTAT  ANGIOSEAL      CT Chest 3/10/22:  Impression   1. Significant patchy ground-glass and consolidative opacities in the   bilateral lower and right middle lobes suspicious for multifocal pneumonia or   aspiration.  Pulmonary edema is also a consideration. 2. Moderate right pleural effusion with smooth pleural thickening suggesting   an exudative effusion.  Small left pleural effusion. 3. Cardiomegaly. 4. Mildly enlarged main pulmonary artery as can be seen with pulmonary   hypertension.    5. Mildly enlarged nonspecific distal right paratracheal lymph node likely   reactive.      US RUQ 3/10:  Impression   Status post cholecystectomy.  No significant findings.      Arterial Lower Ext US 3/11:  Impression   Moderate to severe peripheral arterial disease is identified, with multifocal   atherosclerotic plaque and heavy trifurcation disease based on waveform   analysis.  No severe discrete focal stenosis based on velocity criteria.       Occlusion of the distal peroneal artery seen bilaterally.      Chest X-ray  3/12/22  Left chest pacemaker in stable positioning.  Bilateral airspace opacities,   grossly unchanged.  Small to moderate right pleural effusion, grossly   unchanged.  Stable cardiomediastinal silhouette.  No acute osseous   abnormality.      Pro-BNP: 42,281 => 32,588  Hepatic Function Panel:    Lab Results   Component Value Date    ALKPHOS 216 03/12/2022     03/12/2022     03/12/2022    PROT 5.9 03/12/2022    PROT 7.4 04/10/2012    BILITOT 1.6 03/12/2022    BILIDIR 1.2 03/12/2022    IBILI 0.4 03/12/2022    LABALBU 2.7 03/14/2022     CBC: Recent Labs     03/12/22  0435 03/13/22  0615 03/14/22  0545   WBC 15.4* 10.6* 11.7*   HGB 11.0* 10.3* 10.2*   HCT 35.0* 31.9* 32.5*   MCV 81.4 81.4 81.7    129* 102*     BMP:   Recent Labs     03/12/22  0435 03/13/22  0615 03/14/22  0545   * 131* 138   K 5.7* 4.1 3.2*   CL 93* 91* 98*   CO2 19* 20* 24   BUN 78* 82* 76*   CREATININE 3.4* 3.0* 2.9*   GLUCOSE 87 92 189*       Physical Exam:   BP (!) 89/52   Pulse 81   Temp 97 °F (36.1 °C) (Oral)   Resp 22   Ht 5' 7.01\" (1.702 m)   Wt 136 lb 0.4 oz (61.7 kg)   SpO2 98%   BMI 21.30 kg/m²   General: more alert but slow to respond, chronically ill appearing, confused  Skin: sallow, scattered bruising and chronic changes in the legs       HEENT: Mucous membranes are mildly dry, sclerae are clear.  bitemporal wasting   Neck: difficult to visualize JVP secondary to positioning, carotid upstrokes are diminished without bruit  Chest pacer present in the left upper chest  Heart: distant tones, IRRR, S1S2, II/VI SHELLEY at left sternal border  Lungs:  coarse breath sounds bilaterally, diminished at the bases, with basilar rales, scattered rhonchi   Abdomen: soft, bowel sounds quietly present, no apparent tenderness, nondistended  Extremities:  No mottling, edema, chronic changes in bilateral lower extremities  Neurologic: generally weak     Assessment/Plan:  1. Septic shock with multifocal pneumonia and Parainfluenza virus infection, possible secondary bacterial infection. She is on broad spectrum antibiotics, NorEpinephrine and supportive care, but remains weak with guarded prognosis and underlying medical problems. The patient was on hospice support at home that was revoked for ICU care. Will continue to follow for plan of care. 2. Decompensated heart failure with reduced ejection fraction with LVEF 20-30% with valvular heart disease, pleural effusion  3. Acute Kidney Injury on CKD  4. Transaminitis, likely congestive or ischemic hepatopathy  5. Aortic stenosis, not felt to be a candidate for TAVR   6. Coronary artery disease with STEMI 11/2019 and LAD PCI, with history of cardiogenic shock and Impella  7. Bradycardia with Permanent pacemaker 5/2/2020  8. History of LA thrombus  9. Type 2 diabetes mellitus  10. Atrial fibrillation/flutter  11.  Protein calorie malnutrition, severe        Patient Active Problem List   Diagnosis Code    Atrial flutter (HCC) I48.92    Moderate malnutrition (HCC) E44.0    Pneumonia of left lower lobe due to infectious organism J18.9    Aortic valve stenosis I35.0    Type 2 diabetes mellitus without complication, without long-term current use of insulin (HCC) E11.9    ST elevation MI (STEMI) (HCC) I21.3    STEMI (ST elevation myocardial infarction) (HCC) I21.3    Acute MI ([address] L55.5    Acute systolic (congestive) heart failure (HCC) I50.21    Acute metabolic encephalopathy G05.89    Shock liver K72.00    Aspiration pneumonia (HCC) J69.0    Acute on chronic systolic heart failure (HCC) I50.23    Presence of temporary transvenous cardiac pacemaker Z95.0    Acute heart failure (HCC) X22.2    Systolic heart failure (HCC) I50.20    History of cardiac pacemaker in situ Z95.0    CAD (coronary artery disease) I25.10    Atrial fibrillation (HCC) I48.91    Aortic stenosis I35.0    Septic shock (HCC) A41.9, R65.21       JOY Templeton MD  3/14/2022

## 2022-03-14 NOTE — CONSULTS
Palliative Care consult for goals of care and family support. 81 Y. O. female with PMH COPD, ESTEFANÍA, DM II,GERD, CKD,,HDL,HTN,Afib, CAD, STEMI 2019, pacemaker,LV thrombus 4/2020, HF, and aortic stenosis. Patient presented to the ED via EMS 3/10/22. Patient was having weakness,SOB, decreased urine output,visual hallucinations. She was hypotensive in the ED , she was given IV fluids which were ineffective so levophed was started. Patient noted to have acute kidney injury with creatinine of 2.9 along with hyperkalemia with potassium of 6. 1. CT of the chest showed significant patchy groundglass and consolidative opacities in the bilateral lower and right middle lobe suspicious for multifocal pneumonia or pulmonary edema, moderate right pleural effusion with smooth pleural thickening suggesting exudative effusion, small left pleural effusion, cardiomegaly, signs of pulmonary hypertension. CT of the head was non acute. She is being treated for multifocal pneumonia and parainfluenza virus. She was on Hospice care and living with her daughter prior to admission. She was [facility] code status on admission. Her code status was changed to Limited with Resuscitative Medications only 3/12/22 per daughter Rodrigo Champion and . Dialysis has been declined. Hospice was consulted. Pedro Pablo Ibanez has seen and evaluated patient. ASSESSMENT:  Patient is resting in bed with 2L NC in place. Audible congestion heard from bedside. She easily arouses and is oriented to person. She has a weak cough but was able to relieve some congestion. She follows simple commands and denies pain and SOB. Levophed is infusing at 3 mcg/min to sustain a map of 65. Rodrigo Champion is at bedside. She is aware that Hospice was re-evaluating her mom. She has not heard from the referral office and does not have a meeting set up with the Hospice liaison. I explained the levophed drip and mechanism of action. I also explained that she is only on a low dose at this time. I explained weaning levophed to the MAP and that she cannot be on this medication and be on Hospice. Mariaelena verbalized understanding and will discuss stopping levophed / changing code status to [facility] with family. Minh Wood is patient's NOK and now acting decsion maker. She can not provide 24/7 care for her mom if she returns home. Prior to admission her mom was still maintaining her own ADL's and they had people checking in while Minh Wood was at work. D/T patient's decline she may have to go to a nursing home with Hospice as an extra layer of support if she is not GIP appropriate. I explained to Minh Wood that Hospice does not pay for room and board at a nursing home. She stated she was paying 10K out of pocket at [employer] for her dad before he passed. She would like to speak with CM regarding discharge planning. I have asked Renea Hood for a scopolamine patch for patient's secretions. I provided oral care and ensured her comfort prior to ending my visit. Minh Wood has no further questions at this time.

## 2022-03-14 NOTE — PROGRESS NOTES
Patients BP 66/49 with levo at maxed, O2 stat in the 70s. NRB placed and respiratory notified. Family at bedside.

## 2022-03-14 NOTE — PROGRESS NOTES
flutter ([address] Moderate malnutrition ([address] Pneumonia of left lower lobe due to infectious organism; Aortic valve stenosis; Type 2 diabetes mellitus without complication, without long-term current use of insulin (HCC); ST elevation MI (STEMI) [facility]); STEMI (ST elevation myocardial infarction) ([address] Acute MI ([address] Acute systolic (congestive) heart failure ([address] Acute metabolic encephalopathy; Shock liver; Aspiration pneumonia ([address] Acute on chronic systolic heart failure ([address] Presence of temporary transvenous cardiac pacemaker; Acute heart failure ([address] Systolic heart failure ([address] History of cardiac pacemaker in situ; CAD (coronary artery disease); Atrial fibrillation ([address]  Aortic stenosis; and Septic shock (HCC) on their problem list.  ONSET DATE: this admission    Recent Chest Xray/CT of Chest: see chart    Date of Eval: 3/14/2022  Evaluating Therapist: NICOLE Byers    Current Diet level:  Current Diet : Regular  Current Liquid Diet : Thin      Primary Complaint  Patient Complaint: does not state, lethargic    Pain:  Pain Assessment  Pain Assessment: FLACC  Pain Assessment/FLACC  Pain Rating: FLACC (rest) - Face: no particular expression or smile  Pain Rating: FLACC (rest) - Legs: normal position or relaxed  Pain Rating: FLACC (rest) - Activity: lying quietly, normal position, moves easily  Pain Rating: FLACC (rest) - Cry: no cry (awake or asleep)  Pain Rating: FLACC (rest) - Consolability: content, relaxed  Score: FLACC (rest): 0  Pain Rating: FLACC (activity) - Face: no particular expression or smile  Pain Rating: FLACC (activity) - Legs: normal position or relaxed  Pain Rating: FLACC (activity): lying quietly, normal position, moves easily  Pain Rating: FLACC (activity) - Cry: no cry (awake or asleep)  Pain Rating: FLACC (activity) - Consolability: content, relaxed  Score: FLACC (activity): 0    Reason for Referral  Fraciscogenevieve Hunt was referred for a bedside swallow evaluation to assess the efficiency of her swallow function, identify signs and symptoms of aspiration and make recommendations regarding safe dietary consistencies, effective compensatory strategies, and safe eating environment. Impression  Dysphagia Diagnosis: Moderate oral stage dysphagia; Moderate pharyngeal stage dysphagia  Dysphagia Outcome Severity Scale: Level 3: Moderate dysphagia- Total assisstance, supervision or strategies. Two or more diet consistencies restricted     Treatment Plan  Requires SLP Intervention: Yes  Duration/Frequency of Treatment: 1-2x/week for LOS  D/C Recommendations: 24 hour supervision/assistance; To be determined       Recommended Diet and Intervention  Diet Solids Recommendation: Dysphagia Pureed (Dysphagia I)  Liquid Consistency Recommendation: Mildly Thick (Nectar)  Recommended Form of Meds: Meds in puree     Therapeutic Interventions: Diet tolerance monitoring;Patient/Family education    Compensatory Swallowing Strategies  Compensatory Swallowing Strategies: Upright as possible for all oral intake;Eat/Feed slowly; Small bites/sips; Total feed; Liquid by spoon only    Treatment/Goals  Short-term Goals  Timeframe for Short-term Goals: length of admission  Goal 1: Pt will tolerate pureed diet/nectar thick liquids via tsp with adequate oral manipulation/clearance and no s/s aspiration. Goal 2: Pt will tolerate PO trials of advanced textures with adequate oral manipulation/clearance and no s/s aspiration. Goal 3: Pt/caregivers will indicate understanding of all recommendations. General  Chart Reviewed: Yes  Behavior/Cognition: Lethargic;Requires cueing; Cooperative  Respiratory Status: O2 via nasual cannula  O2 Device: Nasal cannula  Communication Observation:  (limited verbalizations)  Follows Directions:  (minimally given max cues)  Patient Positioning: Upright in bed  Prior Dysphagia History: yes; seen in 2019, recommended soft solids/nectar thick liquids  Consistencies Administered: Dysphagia Pureed (Dysphagia I); Ice Chips; Thin - teaspoon; Thin - straw;Nectar - teaspoon           Vision/Hearing       Oral Motor Deficits       Oral Phase Dysfunction  Oral Phase  Oral Phase: Exceptions     Indicators of Pharyngeal Phase Dysfunction   Pharyngeal Phase  Pharyngeal Phase: Exceptions    Prognosis  Prognosis  Prognosis for safe diet advancement: guarded  Barriers to reach goals: fatigue  Individuals consulted  Consulted and agree with results and recommendations: Family member;RN  Family member consulted: daughter    Education  Patient Education: recommendations/plan  Patient Education Response: No evidence of learning  Safety Devices in place: Yes  Type of devices:  All fall risk precautions in place       Therapy Time  SLP Individual Minutes  Time In: 1020  Time Out: [address]  Minutes: 703 [facility]-SLP  3/14/2022 3:01 PM

## 2022-03-14 NOTE — PROGRESS NOTES
Levo stopped at this time, airvow removed and placed on 2L NC for comfort. Family at bedside. Emotional support given.

## 2022-03-14 NOTE — PROGRESS NOTES
Pulmonary and Critical Care  Progress Note    Subjective: The patient is better,On 2L N/C. Shortness of breath has improved. Chest pain none  Addressing respiratory complaints Patient is negative for  hemoptysis and cyanosis  CONSTITUTIONAL:  negative for fevers and chills      Past Medical History:     has a past medical history of Abnormal CT scan, lung, Aortic stenosis, Arthritis, Atrial fibrillation ([address] CAD (coronary artery disease), Diabetes mellitus ([address] Heart murmur, History of cardiac pacemaker in situ, Hyperlipidemia, Hypertension, and Systolic heart failure ([address] has a past surgical history that includes Abdominal exploration surgery ([id number]); Cholecystectomy (2015); Hysterectomy (1985); eye surgery (Bilateral, 2000 & 2009); Tonsillectomy and adenoidectomy (1943); Pacemaker insertion (N/A, 5/2/2020); and Coronary angioplasty with stent (11/2019). reports that she quit smoking about 7 years ago. Her smoking use included cigarettes. She started smoking about 66 years ago. She has a 59.00 pack-year smoking history. She has never used smokeless tobacco. She reports previous alcohol use. She reports that she does not use drugs. Family history:  family history includes Heart Attack in her father; Heart Disease in her father and mother; High Blood Pressure in her father and mother. Allergies   Allergen Reactions    Zinc     Pcn [Penicillins] Rash     Social History:    Reviewed; no changes    Objective:   PHYSICAL EXAM:        VITALS:  BP (!) 89/52   Pulse 81   Temp 97 °F (36.1 °C) (Oral)   Resp 22   Ht 5' 7.01\" (1.702 m)   Wt 136 lb 0.4 oz (61.7 kg)   SpO2 98%   BMI 21.30 kg/m²     24HR INTAKE/OUTPUT:      Intake/Output Summary (Last 24 hours) at 3/14/2022 1148  Last data filed at 3/14/2022 0614  Gross per 24 hour   Intake 742.5 ml   Output 1875 ml   Net -1132.5 ml       CONSTITUTIONAL:  Awake. LUNGS:  decreased breath sounds, basilar crackles.   CARDIOVASCULAR:  normal S1 and S2 and with the RN.   Lala aBrillas MD  3/14/2022  11:48 AM

## 2022-03-14 NOTE — PROGRESS NOTES
Nephrology Progress Note  3/14/2022 10:35 AM  Subjective: Interval History: Usman Bell is a 81 y.o. female weak and arousable but congested    Data:   Scheduled Meds:   vancomycin  1,000 mg IntraVENous Once    sodium chloride  500 mL IntraVENous Once    amiodarone  200 mg Oral Daily    sodium chloride flush  5-40 mL IntraVENous 2 times per day    heparin (porcine)  5,000 Units SubCUTAneous 3 times per day    vancomycin (VANCOCIN) intermittent dosing (placeholder)   Other See Admin Instructions    cefepime  1,000 mg IntraVENous Q12H    albuterol sulfate HFA  2 puff Inhalation 4x daily    ipratropium  2 puff Inhalation 4x daily    pantoprazole  40 mg IntraVENous Daily     Continuous Infusions:   IV infusion builder 75 mL/hr at 03/14/22 0008    norepinephrine-sodium chloride 3 mcg/min (03/14/22 0008)    sodium chloride      dextrose           CBC   Recent Labs     03/12/22  0435 03/13/22  0615 03/14/22  0545   WBC 15.4* 10.6* 11.7*   HGB 11.0* 10.3* 10.2*   HCT 35.0* 31.9* 32.5*    129* 102*      BMP   Recent Labs     03/12/22  0435 03/13/22  0615 03/14/22  0545   * 131* 138   K 5.7* 4.1 3.2*   CL 93* 91* 98*   CO2 19* 20* 24   PHOS 5.0* 4.9 4.6   BUN 78* 82* 76*   CREATININE 3.4* 3.0* 2.9*     Hepatic:   Recent Labs     03/12/22  1848   *   *   BILITOT 1.6*   ALKPHOS 216*     Troponin: No results for input(s): TROPONINI in the last 72 hours. BNP: No results for input(s): BNP in the last 72 hours. Lipids: No results for input(s): CHOL, HDL in the last 72 hours.     Invalid input(s): LDLCALCU  ABGs:   Lab Results   Component Value Date    PO2ART 80 03/11/2022    NPS6OAM 33.0 03/11/2022     INR:   Recent Labs     03/11/22  1145   INR 1.83     Renal Labs  Albumin:    Lab Results   Component Value Date    LABALBU 2.7 03/14/2022     Calcium:    Lab Results   Component Value Date    CALCIUM 8.3 03/14/2022     Phosphorus:    Lab Results   Component Value Date    PHOS 4.6 03/14/2022     U/A:    Lab Results   Component Value Date    NITRU NEGATIVE 03/11/2022    COLORU YELLOW 03/11/2022    WBCUA 43 03/11/2022    RBCUA 7 03/11/2022    MUCUS RARE 03/11/2022    TRICHOMONAS NONE SEEN 03/11/2022    YEAST FEW 09/11/2017    BACTERIA NEGATIVE 03/11/2022    CLARITYU CLEAR 03/11/2022    SPECGRAV 1.020 03/11/2022    UROBILINOGEN 0.2 03/11/2022    BILIRUBINUR NEGATIVE 03/11/2022    BLOODU TRACE 03/11/2022    KETUA TRACE 03/11/2022     ABG:    Lab Results   Component Value Date    KLA2AKR 33.0 03/11/2022    PO2ART 80 03/11/2022    IQS4SOI 18.2 03/11/2022     HgBA1c:    Lab Results   Component Value Date    LABA1C 6.9 09/12/2019     Microalbumen/Creatinine ratio:  No components found for: RUCREAT  TSH:  No results found for: TSH  IRON:    Lab Results   Component Value Date    IRON 40 04/30/2020     Iron Saturation:  No components found for: PERCENTFE  TIBC:    Lab Results   Component Value Date    TIBC 261 04/30/2020     FERRITIN:  No results found for: FERRITIN  RPR:  No results found for: RPR  MARY:  No results found for: ANATITER, MARY  24 Hour Urine for Creatinine Clearance:  No components found for: CREAT4, UHRS10, UTV10      Objective:   I/O: 03/13 0701 - 03/14 0700  In: 742.5 [I.V.:692.5]  Out: 1875 [WKJCD:1180]  I/O last 3 completed shifts: In: 2143.4 [I.V.:1793.4; IV Piggyback:350]  Out: 2160 [Urine:2435]  No intake/output data recorded. Vitals: BP (!) 89/52   Pulse 81   Temp 97 °F (36.1 °C) (Oral)   Resp 22   Ht 5' 7.01\" (1.702 m)   Wt 136 lb 0.4 oz (61.7 kg)   SpO2 98%   BMI 21.30 kg/m²  {  General appearance: arousable  HEENT: Head: Normal, normocephalic, atraumatic.   Neck: supple, symmetrical, trachea midline  Lungs: diminished breath sounds bilaterally congested  Heart: S1, S2 tachycardic  Abdomen: abnormal findings:  soft nt  Extremities: edema trace  Neurologic: Mental status: alertness: arousable        Assessment and Plan:      IMP:  #1 acute renal failure from ATN  #2 hyperkalemia  #3 hyponatremia  #4 metabolic acidosis  #5 low urine output with possible urine retention  #6 type 2 diabetes  #7 possible septic shock  #8 congestive heart failure with ICD and aortic stenosis  #9 CODE STATUS/history of hospice    Plan     1 supportive care today  2 pt more awake but lethargic  3 renal not worse monitor  4  Limited code and await family meet with hospice as that will decide plan of care  For now monitro and treat congestion  Rickey Baker MD, MD

## 2022-03-14 NOTE — PROGRESS NOTES
[address] [facility]  consulted by Dr. Perry Shirley for monitoring and adjustment. Indication for treatment: Sepsis  Goal trough: [] 10-15 mcg/mL or [x] 15-20 mcg/ml  AUC/FRANDY: [] <500 or [x] 400-600    Pertinent Laboratory Values:   Temp Readings from Last 3 Encounters:   03/14/22 97.6 °F (36.4 °C) (Oral)   05/12/20 97.3 °F (36.3 °C) (Oral)   05/06/20 97.6 °F (36.4 °C)     Recent Labs     03/12/22  0435 03/12/22  1845 03/13/22  0615 03/14/22  0545   WBC 15.4*  --  10.6* 11.7*   LACTATE  --  1.6 1.6  --      Recent Labs     03/12/22  0435 03/13/22  0615 03/14/22  0545   BUN 78* 82* 76*   CREATININE 3.4* 3.0* 2.9*     Estimated Creatinine Clearance: 14 mL/min (A) (based on SCr of 2.9 mg/dL (H)). Intake/Output Summary (Last 24 hours) at 3/14/2022 0851  Last data filed at 3/14/2022 0614  Gross per 24 hour   Intake 742.5 ml   Output 1875 ml   Net -1132.5 ml       Pertinent Cultures:  Date    Source    Results  3/11   Blood    NGTD  3/11   Urine    Negative  3/12   Urine    Negative    Vancomycin level:   TROUGH:  No results for input(s): VANCOTROUGH in the last 72 hours.   RANDOM:    Recent Labs     03/12/22 0435 03/14/22  0545   VANCORANDOM 11.7 15.5       Assessment:  · SCr, BUN, and urine output:  · HORTENCIA on CKD, SCR slightly improved @ 2.9  · Baseline Scr 1.4  · UOP may be low  · Day(s) of therapy: 4  · Vancomycin concentration:  · 3/12 - 11.7, 28 hour level post initial 1000mg dose  · 3/14 - 15.5, 36h level, appropriate to re-dose    Plan:  · Due to HORTENCIA on CKD, continue intermittent vancomycin dosing based on levels  · Based on level, re-dose with 1000 mg x1  · Repeat level in 2 days  · Pharmacy will continue to monitor patient and adjust therapy as indicated    VANCOMYCIN CONCENTRATION SCHEDULED FOR 3/16 @ 0600    Thank you for the consult,  WAYNE Ring [facility], PharmD  3/14/2022 8:51 AM

## 2022-03-14 NOTE — PROGRESS NOTES
INTERNAL MEDICINE PROGRESS NOTE        Rolly Morrison   1935   Primary Care Physician:  Isadora Grigsby MD  Admit Date: 3/10/2022     Subjective:   Patient awake, somewhat alert, only oriented to person. Remains fatigued. Blood pressure is low stable on Levophed. She remains on 2L nasal cannula. She voices no acute concerns. Daughter is not present in room. Objective:   /62   Pulse 85   Temp 97.6 °F (36.4 °C) (Oral)   Resp 21   Ht 5' 7.01\" (1.702 m)   Wt 136 lb 0.4 oz (61.7 kg)   SpO2 97%   BMI 21.30 kg/m²    General appearance: alert, appears stated age, cooperative and fatigued  Head: Normocephalic, without obvious abnormality, atraumatic  Neck: no adenopathy and supple, symmetrical, trachea midline  Lungs: Decreased air entry bilaterally  Heart: S1, S2 normal  Abdomen: soft, non-tender; bowel sounds normal  Extremities: no clubbing, cyanosis or edema  Neurologic: Oriented to person only. Generalized weakness.     Data Review  Lab Results   Component Value Date     03/14/2022    K 3.2 (L) 03/14/2022    CL 98 (L) 03/14/2022    CO2 24 03/14/2022    CREATININE 2.9 (H) 03/14/2022    BUN 76 (H) 03/14/2022    CALCIUM 8.3 03/14/2022     Lab Results   Component Value Date    WBC 11.7 (H) 03/14/2022    HGB 10.2 (L) 03/14/2022    HCT 32.5 (L) 03/14/2022    MCV 81.7 03/14/2022     (L) 03/14/2022     INR/Prothrombin Time      Meds:    sodium chloride  500 mL IntraVENous Once    amiodarone  200 mg Oral Daily    sodium chloride flush  5-40 mL IntraVENous 2 times per day    heparin (porcine)  5,000 Units SubCUTAneous 3 times per day    vancomycin (VANCOCIN) intermittent dosing (placeholder)   Other See Admin Instructions    cefepime  1,000 mg IntraVENous Q12H    albuterol sulfate HFA  2 puff Inhalation 4x daily    ipratropium  2 puff Inhalation 4x daily    pantoprazole  40 mg IntraVENous Daily     PRN Meds: sodium chloride flush, sodium chloride, ondansetron **OR** ondansetron, polyethylene glycol, acetaminophen **OR** acetaminophen, glucose, glucagon (rDNA), dextrose, dextrose bolus (hypoglycemia), LORazepam    Assessment/Plan:   Patient Active Hospital Problem List:  Patient Active Problem List   Diagnosis    Atrial flutter ([facility]    Moderate malnutrition ([facility]    Pneumonia of left lower lobe due to infectious organism    Aortic valve stenosis    Type 2 diabetes mellitus without complication, without long-term current use of insulin (HCC)    ST elevation MI (STEMI) ([facility]    STEMI (ST elevation myocardial infarction) ([facility]    Acute MI ([facility]    Acute systolic (congestive) heart failure (HCC)    Acute metabolic encephalopathy    Shock liver    [facility]    Acute on chronic systolic heart failure (HCC)    Presence of temporary transvenous cardiac pacemaker    Acute heart failure (HCC)    Systolic heart failure ([facility]    History of cardiac pacemaker in situ    CAD (coronary artery disease)    Atrial fibrillation ([facility]    Aortic stenosis    Septic shock ([facility]     Assessment:  Septic shock 2/2 multifocal pneumonia/parainfluenza infection:  BP low stable on Levophed,  on IV antibiotics. Cardiology, pulmonology, nephrology following. Pro-Figueroa is improving. Trend. Multifocal pneumonia/parainfluenza infection: Continue IV cefepime/Vanco. pro-Figueroa improving. Negative blood culture x2, negative urine culture. Decompensated HFrEF: EF 20%. Pulmonary edema bilateral pleural effusions present on CT. Cardiology and pulmonology following. Elevated LFTs: Hepatitis panel negative. Could be hypoxic and congested liver. Trend LFTs. HORTENCIA/CKD: Hold nephrotoxic medications. Renal following. Hyperkalemia: In setting of HORTENCIA. Renal following  PAD: arterial US reviewed. HTN/CAD s/p PCI: STEMI November 2019. Paroxysmal atrial fibrillation  DM2  Moderate Aortic stenosis  Protein calorie malnutrition     Plan:  Hospice evaluated the patient.  Prognosis guarded. CODE STATUS is limited. Family to  to determine today if want to pursue hospice/comfort care per nephrology. On Levophed for blood pressure support. Continue IV cefepime/Vanco, pro-Figueroa improving. Monitor labs and patient condition. Electronically signed by Belem Daugherty PA-C on 3/14/2022 at 8:11 AM   I have independently evaluated and examined this patient today. I have reviewed radiologic and biochemical tests on this patient. Management Plan is developed mutually with ROBYN Sylvester. I have reviewed above note and agree with assessment and plan.

## 2022-03-14 NOTE — PROGRESS NOTES
Daily Progress Note  Awake, confusion noted  BP and HR stable, on pressor  Hospice following  PPM interrogated, stable    Septic shock    PNA and parainfluenza infection    ATB per primary    Pulmonology is following    Currently on pressors    Pro calcitonin improving    HFrEF    Last echo showed EF 20- 30%    BNP elevated to over 32,000     Echo pending for today    Pulmonary edema and bilateral pleural effusions noted on CT     Currently IV lasix    Diuretics per renal      Hx Paroxysmal Afib     PPM interrogated; underlying rhythm    Not taking oral medication at this time    Will cont to monitor    Paced on tele, HR stable     Hx CAD    STEMI in 2019 s/p PCI         HORTENCIA    Nephro following     Cr 2.9 today, trending down     Hyperkalemia in the setting of HORTENCIA     K now 3.2    Continue to monitor     Will continue to follow       Echo 5/4/20  IMPRESSION:  1.  There is a clot and thrombus noted in the left atrium and left  atrial appendage. 2.  Left atrium is severely enlarged. 3.  Moderate mitral regurgitation noted. 4.  EF is around 20% to 30% range present. 5.  Moderate-to-severe aortic stenosis present. 6.  Pacer wire is in the right side of the heart. 7.  There is pleural effusion noted, but no pericardial effusion noted.     The JANKI probe was removed with no complications.     PAST MEDICAL HISTORY:  Aortic stenosis, atrial fibrillation, coronary  artery disease, hyperlipidemia, hypertension, and heart failure with  reduced ejection fraction.     PAST SURGICAL HISTORY:  Cholecystectomy, coronary angioplasty with stent  placement, hysterectomy, pacemaker insertion, tonsillectomy,  cholecystectomy and distal gastrectomy, Conner-en-Y.     ALLERGIES:  ZINC AND PENICILLIN.   Objective:   BP (!) 89/52   Pulse 81   Temp 97 °F (36.1 °C) (Oral)   Resp 22   Ht 5' 7.01\" (1.702 m)   Wt 136 lb 0.4 oz (61.7 kg)   SpO2 98%   BMI 21.30 kg/m²     Intake/Output Summary (Last 24 hours) at 3/14/2022 1157  Last data filed at 3/14/2022 9645  Gross per 24 hour   Intake 742.5 ml   Output 1875 ml   Net -1132.5 ml       Medications:   Scheduled Meds:   vancomycin  1,000 mg IntraVENous Once    furosemide  40 mg IntraVENous Once    sodium chloride  500 mL IntraVENous Once    amiodarone  200 mg Oral Daily    sodium chloride flush  5-40 mL IntraVENous 2 times per day    heparin (porcine)  5,000 Units SubCUTAneous 3 times per day    vancomycin (VANCOCIN) intermittent dosing (placeholder)   Other See Admin Instructions    cefepime  1,000 mg IntraVENous Q12H    albuterol sulfate HFA  2 puff Inhalation 4x daily    ipratropium  2 puff Inhalation 4x daily    pantoprazole  40 mg IntraVENous Daily      Infusions:   norepinephrine-sodium chloride 3 mcg/min (03/14/22 0008)    sodium chloride      dextrose        PRN Meds:  sodium chloride flush, sodium chloride, ondansetron **OR** ondansetron, polyethylene glycol, acetaminophen **OR** acetaminophen, glucose, glucagon (rDNA), dextrose, dextrose bolus (hypoglycemia), LORazepam       Physical Exam:  Vitals:    03/14/22 0931   BP:    Pulse: 81   Resp:    Temp:    SpO2:         General: AAO, NAD  Chest: Nontender  Cardiac: First and Second Heart Sounds are Normal, No Murmurs or Gallops noted  Lungs:Clear to auscultation and percussion. Abdomen: Soft, NT, ND, +BS  Extremities: No clubbing, no edema  Vascular:  Equal 2+ peripheral pulses.         Lab Data:  CBC:   Recent Labs     03/12/22  0435 03/13/22 0615 03/14/22  0545   WBC 15.4* 10.6* 11.7*   HGB 11.0* 10.3* 10.2*   HCT 35.0* 31.9* 32.5*   MCV 81.4 81.4 81.7    129* 102*     BMP:   Recent Labs     03/12/22  0435 03/13/22  0615 03/14/22  0545   * 131* 138   K 5.7* 4.1 3.2*   CL 93* 91* 98*   CO2 19* 20* 24   PHOS 5.0* 4.9 4.6   BUN 78* 82* 76*   CREATININE 3.4* 3.0* 2.9*     LIVER PROFILE:   Recent Labs     03/12/22  1848   *   *   BILIDIR 1.2*   BILITOT 1.6*   ALKPHOS 216*     PT/INR:   Recent Labs 03/11/22  1145   PROTIME 23.7*   INR 1.83     APTT:   Recent Labs     03/11/22  1145   APTT 35.7     BNP:  No results for input(s): BNP in the last 72 hours.       Assessment:  Patient Active Problem List    Diagnosis Date Noted    Septic shock ([address] 03/11/2022    Acute heart failure ([address] 14/84/2122    Systolic heart failure ([address]     History of cardiac pacemaker in situ     CAD (coronary artery disease)     Atrial fibrillation ([address]     Aortic stenosis     Presence of temporary transvenous cardiac pacemaker 05/01/2020    Acute on chronic systolic heart failure ([address] 23/29/9359    Acute systolic (congestive) heart failure ([address] 11/08/2019    Acute metabolic encephalopathy 61/43/2408    Shock liver 11/08/2019    Aspiration pneumonia ([facility] [address] 11/08/2019    Acute MI ([facility] [address] 11/07/2019    ST elevation MI (STEMI) ([address] 11/06/2019    STEMI (ST elevation myocardial infarction) ([facility] [address] 11/06/2019    Pneumonia of left lower lobe due to infectious organism     Aortic valve stenosis     Type 2 diabetes mellitus without complication, without long-term current use of insulin (HCC)     Moderate malnutrition ([address] 09/13/2019    Atrial flutter ([facility] [address] 09/12/2019       Electronically signed by STEPHANIE Choi CNP on 3/14/2022 at 11:57 AM    Patient seen and examined agree with above assessment and plan hospice discussion in progress

## 2022-03-15 NOTE — DISCHARGE SUMMARY
pacemaker    Acute heart failure (HCC)    Systolic heart failure (HCC)    History of cardiac pacemaker in situ    CAD (coronary artery disease)    Atrial fibrillation [facility])    Aortic stenosis    Septic shock [facility])       Discharged Condition:  3/15/22, 7:07PM    Hospital Course: The patient was a chronically ill-appearing 81 y.o. female who presented to ER with weakness and dyspnea. She has history of atrial fibrillation, HTN, HLD, DM2, CAD s/p PCI, GERD, HFrEF with EF around 20%, moderate aortic stenosis. Patient was brought in by squad. Per daughter, patient has been with hospice care and CODE STATUS is DNR CCA and did not want intubation. In the ER, patient was found to be hypotensive she was given IV fluids and she became more hypotensive so she was placed on Levophed with some improvement in blood pressure. proBNP is markedly elevated at 42,000, she does have some degree of chronic BNP elevation but this is above baseline. Troponin is negative. CT of the chest shows significant patchy groundglass and consolidative opacities in the bilateral lower and right middle lobe suspicious for multifocal pneumonia or pulmonary edema, moderate right pleural effusion with smooth pleural thickening suggesting exudative effusion, small left pleural effusion, cardiomegaly, signs of pulmonary hypertension. CT of the head is nonacute. Ultrasound of the abdomen showed no significant findings. Patient noted to have acute kidney injury with creatinine of 2.9 along with hyperkalemia with potassium of 6.1. LFTs were markedly elevated, which could be due to hepatic shock. Hepatitis panel was negative. Lactate was also elevated at 8.4 and WBC is elevated at 16.6. The patient was placed on BIPAP.  Patient was admitted for further management of acute hypoxic respiratory failure secondary to decompensated heart failure and septic shock secondary to possible multifocal pneumonia and at that time she withdrew from hospice for ICU care. She was continued on levophed for BP support and IV antibiotics. Cardiology, pulmonology, and nephrology were consulted. Patients renal function declined. BP remained low even on maxed levophed. LFTs became more elevate from ischemic and congestive liver injury. ECHO rechecked and EF remained around 20%. Patient continued to decline despite optimal care given. After some time, family decided to withdrawal care from the patient, levophed was held, pacemaker turned off and patient passed shortly after. Signed: Electronically signed by Vito Ross PA-C on 3/15/2022 at 11:08 AM    I agree with above note.

## 2022-03-16 LAB
CULTURE: NORMAL
CULTURE: NORMAL
GLUCOSE, FLUID: 57 MG/DL
Lab: NORMAL
Lab: NORMAL
SPECIMEN: NORMAL
SPECIMEN: NORMAL

## 2022-11-17 NOTE — PROGRESS NOTES
[facility] called. Will not follow patient. Reference number given. [employer] Pregnancy And Lactation Text: This medication is Pregnancy Category B and is considered safe. There is some data to suggest avoiding during the third trimester. It is unknown if this medication is excreted in breast milk.

## 2024-08-08 NOTE — H&P
History & Physical        Reason for admission: Septic shock, acute hypoxic respiratory failure, multifocal PNA, decompensated heart failure    History Obtained From: EMR, daughter    HISTORY OF PRESENT ILLNESS:    The patient is a chronically ill-appearing 81 y.o. female who presents to ER with weakness and dyspnea. She has history of atrial fibrillation, HTN, HLD, DM2, CAD s/p PCI, GERD, HFrEF with EF around 20%, moderate aortic stenosis. Patient was brought in by squad. Per daughter, patient is with hospice care and CODE STATUS is DNR CCA and does not want intubation. She reports that she noticed the patient urinating less frequently these past few days. She reports the past 1 day she noticed acute confusion as well. Apparently she was hallucinating and seeing ants at the foot of her bed. In the ER, patient was found to be hypotensive she was given IV fluids and she became more hypotensive so she was placed on Levophed with improvement in blood pressure. proBNP is markedly elevated at 42,000, she does have some degree of chronic BNP elevation but this is above baseline. Troponin is negative. CT of the chest shows significant patchy groundglass and consolidative opacities in the bilateral lower and right middle lobe suspicious for multifocal pneumonia or pulmonary edema, moderate right pleural effusion with smooth pleural thickening suggesting exudative effusion, small left pleural effusion, cardiomegaly, signs of pulmonary hypertension. CT of the head is nonacute. Ultrasound of the abdomen showed no significant findings. Arterial duplex of the lower extremities show moderate to severe peripheral artery disease but no severe discrete focal stenosis based on velocity criteria, occlusion of the distal peroneal arteries seen bilaterally. Patient noted to have acute kidney injury with creatinine of 2.9 along with hyperkalemia with potassium of 6.1. The patient sodium is low at 121.   LFTs are markedly elevated, which could be due to hepatic shock. Hepatitis panel was negative. Lactate is also elevated at 8.4 and WBC is elevated at 16.6. Today, the patient remains on BiPAP with daughter Eleanor Rivero present in room. Patient is arousable. There is no major lower extremity edema. Patient will be admitted for further management of acute hypoxic respiratory failure secondary to decompensated heart failure and septic shock secondary to possible multifocal pneumonia. Past Medical History:        Diagnosis Date    Abnormal CT scan, lung 09/12/2019    Peripheral consolidation in the right lower lobe, approximately 1.8 x 1.3 cm. There is a calcified granuloma in the right lower lobe    Aortic stenosis     Arthritis     Hands, back    Atrial fibrillation (HCC)     CAD (coronary artery disease)     Diabetes mellitus (HCC)     Type II - diet controlled as of 10/2/19    Heart murmur     Dr. Maile Davila History of cardiac pacemaker in situ     Hyperlipidemia     Hypertension     Follows with PCP    Systolic heart failure (HCC)     LVEF 20-30%       Past Surgical History:        Procedure Laterality Date    ABDOMINAL EXPLORATION SURGERY  [id number]    distal gastrectomy, migel en y , cholecystectomy    CHOLECYSTECTOMY  2015    CORONARY ANGIOPLASTY WITH STENT PLACEMENT  11/2019    LAD per Dr Charlie Du Bilateral 2000 & 2009    cataracts   [id number] N/A 5/2/2020    PACEMAKER INSERTION PERMANENT performed by Hugh Sandoval MD at [facility]       Medications Prior to Admission:    Not in a hospital admission. Allergies:  Zinc and Pcn [penicillins]    Social History:   TOBACCO:   reports that she quit smoking about 7 years ago. Her smoking use included cigarettes. She started smoking about 66 years ago. She has a 59.00 pack-year smoking history.  She has never used smokeless tobacco.  ETOH:   reports previous alcohol use.    Family History:       Problem Relation Age of Onset    Heart Disease Mother     High Blood Pressure Mother     Heart Disease Father     High Blood Pressure Father     Heart Attack Father        REVIEW OF SYSTEMS:  ROS as noted in HPI      PHYSICAL EXAM:  /68   Pulse 78   Temp 97.9 °F (36.6 °C) (Oral)   Resp 19   Ht 5' 7.01\" (1.702 m)   Wt 136 lb 0.4 oz (61.7 kg)   SpO2 92%   BMI 21.30 kg/m²   General appearance: alert, appears stated age, cooperative and mild distress. Chronically ill. Head: Normocephalic, without obvious abnormality, atraumatic  Eyes: conjunctivae/corneas clear. Ears: normal external  ears  Neck: no adenopathy, supple, symmetrical, trachea midline  Lungs: Decreased breath sounds bilaterally. On Bipap  Heart: S1, S2   Abdomen:soft, non-tender; non-distended normal bowel sounds  Extremities:Pedal edema Trace   Skin: No rashes or lesions  Neurologic: Alert. Generalized weakness. Fatigued. Mental status: Alert, oriented x2. Cranial nerves:II-XII Grossly intact    DATA:    Imaging:  Arterial duplex bilateral lower extremities, 3/11/2022  Moderate to severe peripheral arterial disease is identified, with multifocal   atherosclerotic plaque and heavy trifurcation disease based on waveform   analysis.  No severe discrete focal stenosis based on velocity criteria. Occlusion of the distal peroneal artery seen bilaterally. Ultrasound abdomen, 3/11/2022  Status post cholecystectomy.  No significant findings. CT chest 3/10/2022  1. Significant patchy ground-glass and consolidative opacities in the   bilateral lower and right middle lobes suspicious for multifocal pneumonia or   aspiration.  Pulmonary edema is also a consideration. 2. Moderate right pleural effusion with smooth pleural thickening suggesting   an exudative effusion.  Small left pleural effusion. 3. Cardiomegaly. 4. Mildly enlarged main pulmonary artery as can be seen with pulmonary   hypertension. 5. Mildly enlarged nonspecific distal right paratracheal lymph node likely   reactive. CT head, 3/10/2022  No acute intracranial abnormality. CBC with Differential:    Lab Results   Component Value Date    WBC 16.6 03/10/2022    RBC 4.82 03/10/2022    HGB 12.3 03/10/2022    HCT 41.3 03/10/2022     03/10/2022    MCV 85.7 03/10/2022    SEGSPCT 92.8 03/10/2022    LYMPHOPCT 2.3 03/10/2022    EOSPCT 2.4 04/10/2012    DIFFTYPE AUTOMATED DIFFERENTIAL 03/10/2022     BMP:    Lab Results   Component Value Date     03/10/2022    K 6.1 03/10/2022    CL 82 03/10/2022    CO2 13 03/10/2022    BUN 63 03/10/2022    CREATININE 2.9 03/10/2022    CALCIUM 9.3 03/10/2022    GFRAA 19 03/10/2022    LABGLOM 15 03/10/2022    LABGLOM 68 10/15/2019    GLUCOSE 104 03/10/2022     PT/INR:  No results found for: PTINR    ASSESSMENT / PLAN:     Assessment:  Septic shock 2/2 multifocal pneumonia: on Levophed, IV antibiotics. Cardiology, pulmonology, nephrology consulted. Multifocal pneumonia: Continue IV cefepime/Vanco, check blood culture, urine culture, pro-Figueroa, respiratory PCR, trend lactate and WBC. Decompensated HFrEF: EF 20%. Pulmonary edema bilateral pleural effusions present on CT. Cardiology consulted. Elevated LFTs: Hepatitis panel negative. Could be hypoxic liver. Trend LFTs. HORTENCIA/CKD: Hold nephrotoxic medications. Consult renal.  Hyperkalemia: In setting of HORTENCIA. Renal consulted. PAD: arterial US reviewed. HTN/CAD s/p PCI: STEMI November 2019. Paroxysmal atrial fibrillation  DM2  Moderate Aortic stenosis  Protein calorie malnutrition    Plan:  Chronically ill patient, per daughter Moisés Crowley, patient is on hospice, DNR CCA, wants to limit aggressive measures. Does not want intubation. On Levophed for blood pressure support. Continue IV cefepime/Vanco, work-up infection. Consult pulmonology, nephrology, cardiology. Prognosis guarded. Discussed with daughter. Monitor labs and patient condition. [Chaperone Declined] : Patient declined chaperone Electronically signed by Azalea Bolivar PA-C on 3/11/2022 at 10:23 AM   I have independently evaluated and examined this patient today. I have reviewed radiologic and biochemical tests on this patient. Management Plan is developed mutually with ROBYN Diop. I have reviewed above note and agree with assessment and plan. Discussed extensively about the terminal condition of the patient. She is not in hospice at this time, we will give appropriate treatment and daughter is aware of the seriousness of the condition. Discussed with ER Physician in detail also. [Appropriately responsive] : appropriately responsive [Alert] : alert [No Acute Distress] : no acute distress [Soft] : soft [Non-tender] : non-tender [Non-distended] : non-distended [No HSM] : No HSM [No Lesions] : no lesions [No Mass] : no mass [Oriented x3] : oriented x3 [Examination Of The Breasts] : a normal appearance [No Masses] : no breast masses were palpable [Labia Majora] : normal [Labia Minora] : normal [Normal] : normal [Uterine Adnexae] : normal

## (undated) DEVICE — ELECTRODE ES AD CRDLSS PT RET REM POLYHESIVE

## (undated) DEVICE — GLOVE SURG SZ 75 CRM LTX FREE POLYISOPRENE POLYMER BEAD ANTI

## (undated) DEVICE — STAPLER SKIN L39MM DIA0.53MM CRWN 5.7MM S STL FIX HD PROX

## (undated) DEVICE — SUTURE VCRL SZ 3-0 L27IN ABSRB UD L26MM SH 1/2 CIR J416H

## (undated) DEVICE — ELECTRODE ES L2.75IN S STL INSUL BLDE W/ SL EDGE

## (undated) DEVICE — SOLUTION IV IRRIG POUR BRL 0.9% SODIUM CHL 2F7124

## (undated) DEVICE — GLOVE ORANGE PI 7   MSG9070

## (undated) DEVICE — YANKAUER,FLEXIBLE HANDLE,REGLR CAPACITY: Brand: MEDLINE INDUSTRIES, INC.

## (undated) DEVICE — AMD ANTIMICROBIAL NON-ADHERENT PAD,0.2% POLYHEXAMETHYLENE BIGUANIDE HCI (PHMB): Brand: TELFA

## (undated) DEVICE — SUTURE PERMAHAND SZ 2-0 L18IN NONABSORBABLE BLK L26MM SH C012D

## (undated) DEVICE — DECANTER FLD 9IN ST BG FOR ASEP TRNSF OF FLD

## (undated) DEVICE — GLOVE SURG SZ 65 CRM LTX FREE POLYISOPRENE POLYMER BEAD ANTI

## (undated) DEVICE — CHLORAPREP 26ML ORANGE

## (undated) DEVICE — INTRODUCER SHTH 7FR L13CM NDL 18GA GWIRE 0.035IN SYR VLV

## (undated) DEVICE — TUBING, SUCTION, 9/32" X 10', STRAIGHT: Brand: MEDLINE

## (undated) DEVICE — DRESSING TRNSPAR W5XL4.5IN FLM SHT SEMIPERMEABLE WIND

## (undated) DEVICE — TOWEL,OR,DSP,ST,BLUE,STD,6/PK,12PK/CS: Brand: MEDLINE

## (undated) DEVICE — Z DUP USE 2257490 ADHESIVE SKIN CLSRE 036ML TPCL 2CTL CNCRLTE HIGH VSCSTY DRMB

## (undated) DEVICE — SUTURE PROL SZ 2-0 L30IN NONABSORBABLE BLU L26MM SH 1/2 CIR 8833H

## (undated) DEVICE — PENCIL ES CRD L10FT HND SWCHING ROCK SWCH W/ EDGE COAT BLDE

## (undated) DEVICE — CABLE PACE LNG L12IN CHN A OR V SM CLP EPG TEMP DISP

## (undated) DEVICE — SOLUTION IV IRRIG WATER 1000ML POUR BRL 2F7114

## (undated) DEVICE — MARKER SURG SKIN UTIL REGULAR/FINE 2 TIP W/ RUL AND 9 LBL

## (undated) DEVICE — INTENDED FOR TISSUE SEPARATION, AND OTHER PROCEDURES THAT REQUIRE A SHARP SURGICAL BLADE TO PUNCTURE OR CUT.: Brand: BARD-PARKER ® STAINLESS STEEL BLADES

## (undated) DEVICE — COUNTER NDL 30 COUNT FOAM STRP SGL MAG

## (undated) DEVICE — GAUZE,SPONGE,4"X4",16PLY,XRAY,STRL,LF: Brand: MEDLINE